# Patient Record
Sex: FEMALE | Race: BLACK OR AFRICAN AMERICAN | NOT HISPANIC OR LATINO | Employment: FULL TIME | URBAN - METROPOLITAN AREA
[De-identification: names, ages, dates, MRNs, and addresses within clinical notes are randomized per-mention and may not be internally consistent; named-entity substitution may affect disease eponyms.]

---

## 2018-04-13 LAB
ABSOL LYMPHOCYTES (HISTORICAL): 1.8 K/UL (ref 0.5–4)
ALBUMIN SERPL BCP-MCNC: 3.9 G/DL (ref 3–5.2)
ALP SERPL-CCNC: 55 U/L (ref 43–122)
ALT SERPL W P-5'-P-CCNC: 23 U/L (ref 9–52)
AMORPHOUS MATERIAL (HISTORICAL): ABNORMAL
AMPHETAMINE URINE (HISTORICAL): NEGATIVE
ANION GAP SERPL CALCULATED.3IONS-SCNC: 7 MMOL/L (ref 5–14)
AST SERPL W P-5'-P-CCNC: 20 U/L (ref 14–36)
BACTERIA UR QL AUTO: ABNORMAL
BARBITURATE URINE (HISTORICAL): NEGATIVE
BASOPHILS # BLD AUTO: 0 K/UL (ref 0–0.1)
BASOPHILS # BLD AUTO: 1 % (ref 0–1)
BENZODIAZEPINE URINE (HISTORICAL): NEGATIVE
BILIRUB SERPL-MCNC: 0.2 MG/DL
BILIRUB UR QL STRIP: NEGATIVE MG/DL
BUN SERPL-MCNC: 9 MG/DL (ref 5–25)
CALCIUM SERPL-MCNC: 9.2 MG/DL (ref 8.4–10.2)
CANDIDA ANTIGEN DETECTION (HISTORICAL): ABNORMAL
CASTS/CASTS TYPE (HISTORICAL): ABNORMAL /LPF
CHLORIDE SERPL-SCNC: 105 MEQ/L (ref 97–108)
CHOLEST SERPL-MCNC: 125 MG/DL
CHOLEST/HDLC SERPL: 2.1 {RATIO}
CK SERPL-CCNC: 98 U/L (ref 30–135)
CK SERPL-CCNC: 98 U/L (ref 30–135)
CLARITY UR: CLEAR
CO2 SERPL-SCNC: 28 MMOL/L (ref 22–30)
COCAINE (METAB.), URINE (HISTORICAL): NEGATIVE
COLOR UR: ABNORMAL
CREATINE KINASE-MB FRACTION (HISTORICAL): <0.22 NG/ML (ref 0–2.37)
CREATINE, SERUM (HISTORICAL): 0.85 MG/DL (ref 0.6–1.2)
CRYSTAL TYPE (HISTORICAL): ABNORMAL /HPF
D-DIMER QUANTITATIVE (HISTORICAL): <0.19 MG/L FEU'S
DEPRECATED RDW RBC AUTO: 16.8 %
DRUG COMMENT (HISTORICAL): ABNORMAL
EGFR (HISTORICAL): >60 ML/MIN/1.73 M2
EOSINOPHIL # BLD AUTO: 0.2 K/UL (ref 0–0.4)
EOSINOPHIL NFR BLD AUTO: 3 % (ref 0–6)
ERYTHROCYTE SEDIMENTATION RATE (HISTORICAL): 35 MM (ref 1–20)
ERYTHROCYTE SEDIMENTATION RATE (HISTORICAL): 42 MM (ref 1–20)
FERRITIN SERPL-MCNC: 5 NG/ML (ref 6–137)
GARDNERELLA VAGINALIS (HISTORICAL): DETECTED
GLUCOSE SERPL-MCNC: 100 MG/DL (ref 70–99)
GLUCOSE UR STRIP-MCNC: NEGATIVE MG/DL
HCT VFR BLD AUTO: 33.5 % (ref 36–46)
HDLC SERPL-MCNC: 60 MG/DL
HGB BLD-MCNC: 10.6 G/DL (ref 12–16)
HGB UR QL STRIP.AUTO: ABNORMAL
IRON SERPL-MCNC: 29 UG/DL (ref 37–170)
KETONES UR STRIP-MCNC: NEGATIVE MG/DL
LDL/HDL RATIO (HISTORICAL): 0.8
LDLC SERPL CALC-MCNC: 48 MG/DL
LEUKOCYTE ESTERASE UR QL STRIP: ABNORMAL
LYMPHOCYTES NFR BLD AUTO: 22 % (ref 25–45)
MAGNESIUM SERPL-MCNC: 1.8 MG/DL (ref 1.6–2.3)
MCH RBC QN AUTO: 25.3 PG (ref 26–34)
MCHC RBC AUTO-ENTMCNC: 31.7 % (ref 31–36)
MCV RBC AUTO: 80 FL (ref 80–100)
MDMA (GC/MS) (HISTORICAL): NEGATIVE
METHADONE URINE (HISTORICAL): NEGATIVE
METHAMPHETAMINE URINE (HISTORICAL): NEGATIVE
MONOCYTES # BLD AUTO: 0.5 K/UL (ref 0.2–0.9)
MONOCYTES NFR BLD AUTO: 7 % (ref 1–10)
MUCOUS THREADS URNS QL MICRO: ABNORMAL
NEUTROPHILS ABS COUNT (HISTORICAL): 5.4 K/UL (ref 1.8–7.8)
NEUTS SEG NFR BLD AUTO: 67 % (ref 45–65)
NITRITE UR QL STRIP: NEGATIVE
NON-SQ EPI CELLS URNS QL MICRO: ABNORMAL
OPIATES (HISTORICAL): NEGATIVE
OTHER STN SPEC: ABNORMAL
OXYCODONE (HISTORICAL): NEGATIVE
PERCENT SATURATION (HISTORICAL): 7 % (ref 11–46)
PH UR STRIP.AUTO: 7 [PH] (ref 4.5–8)
PHENCYCLIDINE URINE (HISTORICAL): NEGATIVE
PLATELET # BLD AUTO: 290 K/MCL (ref 150–450)
POTASSIUM SERPL-SCNC: 3.6 MEQ/L (ref 3.6–5)
PREGNANCY TEST URINE (HISTORICAL): NEGATIVE
PREGNANCY, SERUM (HISTORICAL): NEGATIVE
PROT UR STRIP-MCNC: NEGATIVE MG/DL
RBC # BLD AUTO: 4.2 M/MCL (ref 4–5.2)
RBC #/AREA URNS AUTO: 1 /HPF
SODIUM SERPL-SCNC: 140 MEQ/L (ref 137–147)
SP GR UR STRIP.AUTO: 1.01 (ref 1–1.04)
THC URINE (HISTORICAL): POSITIVE
TIBC SERPL-MCNC: 441 UG/DL (ref 265–497)
TOTAL PROTEIN (HISTORICAL): 6.8 G/DL (ref 5.9–8.4)
TRICHOMONAS (HISTORICAL): DETECTED
TRICYCLICS URINE (HISTORICAL): NEGATIVE
TRIGL SERPL-MCNC: 83 MG/DL
TROPONIN I SERPL-MCNC: 0.08 NG/ML (ref 0–0.03)
TROPONIN I SERPL-MCNC: 0.09 NG/ML (ref 0–0.03)
TSH SERPL DL<=0.05 MIU/L-ACNC: 1 UIU/ML (ref 0.47–4.68)
TSH SERPL DL<=0.05 MIU/L-ACNC: 1.03 UIU/ML (ref 0.47–4.68)
UROBILINOGEN UR QL STRIP.AUTO: NEGATIVE MG/DL (ref 0–1)
VLDLC SERPL CALC-MCNC: 17 MG/DL (ref 0–40)
WBC # BLD AUTO: 8.1 K/MCL (ref 4.5–11)
WBC #/AREA URNS AUTO: ABNORMAL /HPF

## 2018-04-14 LAB
CK SERPL-CCNC: 83 U/L (ref 30–135)
CREATINE KINASE-MB FRACTION (HISTORICAL): <0.22 NG/ML (ref 0–2.37)
TROPONIN I SERPL-MCNC: 0.09 NG/ML (ref 0–0.03)
TROPONIN I SERPL-MCNC: 0.1 NG/ML (ref 0–0.03)

## 2018-04-15 LAB
ABSOL LYMPHOCYTES (HISTORICAL): 1.6 K/UL (ref 0.5–4)
B-NP NT PRO (HISTORICAL): 23 PG/ML
BASOPHILS # BLD AUTO: 0 K/UL (ref 0–0.1)
BASOPHILS # BLD AUTO: 1 % (ref 0–1)
DEPRECATED RDW RBC AUTO: 17 %
EOSINOPHIL # BLD AUTO: 0.3 K/UL (ref 0–0.4)
EOSINOPHIL NFR BLD AUTO: 5 % (ref 0–6)
HCT VFR BLD AUTO: 33.7 % (ref 36–46)
HGB BLD-MCNC: 10.5 G/DL (ref 12–16)
LYMPHOCYTES NFR BLD AUTO: 25 % (ref 25–45)
MCH RBC QN AUTO: 25.2 PG (ref 26–34)
MCHC RBC AUTO-ENTMCNC: 31.2 % (ref 31–36)
MCV RBC AUTO: 81 FL (ref 80–100)
MONOCYTES # BLD AUTO: 0.7 K/UL (ref 0.2–0.9)
MONOCYTES NFR BLD AUTO: 10 % (ref 1–10)
NEUTROPHILS ABS COUNT (HISTORICAL): 3.8 K/UL (ref 1.8–7.8)
NEUTS SEG NFR BLD AUTO: 59 % (ref 45–65)
PLATELET # BLD AUTO: 289 K/MCL (ref 150–450)
RBC # BLD AUTO: 4.19 M/MCL (ref 4–5.2)
TROPONIN I SERPL-MCNC: 0.09 NG/ML (ref 0–0.03)
TROPONIN I SERPL-MCNC: 0.09 NG/ML (ref 0–0.03)
WBC # BLD AUTO: 6.5 K/MCL (ref 4.5–11)

## 2018-08-20 ENCOUNTER — HOSPITAL ENCOUNTER (EMERGENCY)
Facility: HOSPITAL | Age: 33
Discharge: HOME | End: 2018-08-21
Attending: EMERGENCY MEDICINE | Admitting: EMERGENCY MEDICINE
Payer: COMMERCIAL

## 2018-08-20 DIAGNOSIS — Z20.2 POSSIBLE EXPOSURE TO STD: Primary | ICD-10-CM

## 2018-08-20 LAB
BILIRUB UR QL STRIP: NEGATIVE
CLARITY UR: CLEAR
COLOR UR: ABNORMAL
EXT PREG TEST URINE: NEGATIVE
GLUCOSE UR STRIP-MCNC: NEGATIVE MG/DL
HGB UR QL STRIP.AUTO: NEGATIVE
KETONES UR STRIP-MCNC: ABNORMAL MG/DL
LEUKOCYTE ESTERASE UR QL STRIP: 25
NITRITE UR QL STRIP: NEGATIVE
PH UR STRIP.AUTO: 6 [PH] (ref 4.5–8)
PROT UR STRIP-MCNC: ABNORMAL MG/DL
SP GR UR STRIP.AUTO: 1.02 (ref 1–1.04)
UROBILINOGEN UA: 1 MG/DL

## 2018-08-20 PROCEDURE — 81001 URINALYSIS AUTO W/SCOPE: CPT | Performed by: EMERGENCY MEDICINE

## 2018-08-20 PROCEDURE — 87660 TRICHOMONAS VAGIN DIR PROBE: CPT | Performed by: EMERGENCY MEDICINE

## 2018-08-20 PROCEDURE — 87480 CANDIDA DNA DIR PROBE: CPT | Performed by: EMERGENCY MEDICINE

## 2018-08-20 PROCEDURE — 87491 CHLMYD TRACH DNA AMP PROBE: CPT | Performed by: EMERGENCY MEDICINE

## 2018-08-20 PROCEDURE — 81025 URINE PREGNANCY TEST: CPT | Performed by: EMERGENCY MEDICINE

## 2018-08-20 PROCEDURE — 87510 GARDNER VAG DNA DIR PROBE: CPT | Performed by: EMERGENCY MEDICINE

## 2018-08-20 PROCEDURE — 87591 N.GONORRHOEAE DNA AMP PROB: CPT | Performed by: EMERGENCY MEDICINE

## 2018-08-21 VITALS
BODY MASS INDEX: 33.96 KG/M2 | DIASTOLIC BLOOD PRESSURE: 89 MMHG | HEART RATE: 62 BPM | SYSTOLIC BLOOD PRESSURE: 140 MMHG | OXYGEN SATURATION: 100 % | HEIGHT: 62 IN | WEIGHT: 184.53 LBS | TEMPERATURE: 97.3 F | RESPIRATION RATE: 18 BRPM

## 2018-08-21 VITALS
OXYGEN SATURATION: 100 % | HEART RATE: 70 BPM | RESPIRATION RATE: 18 BRPM | DIASTOLIC BLOOD PRESSURE: 66 MMHG | TEMPERATURE: 99.1 F | WEIGHT: 184.53 LBS | BODY MASS INDEX: 33.75 KG/M2 | SYSTOLIC BLOOD PRESSURE: 119 MMHG

## 2018-08-21 DIAGNOSIS — Z20.2 POSSIBLE EXPOSURE TO STD: Primary | ICD-10-CM

## 2018-08-21 LAB
BACTERIA UR QL AUTO: ABNORMAL /HPF
CAOX CRY URNS QL MICRO: ABNORMAL /HPF
MUCOUS THREADS UR QL AUTO: ABNORMAL
NON-SQ EPI CELLS URNS QL MICRO: ABNORMAL /HPF
RBC #/AREA URNS AUTO: ABNORMAL /HPF
WBC #/AREA URNS AUTO: ABNORMAL /HPF

## 2018-08-21 PROCEDURE — 96372 THER/PROPH/DIAG INJ SC/IM: CPT

## 2018-08-21 PROCEDURE — 99284 EMERGENCY DEPT VISIT MOD MDM: CPT

## 2018-08-21 PROCEDURE — 99283 EMERGENCY DEPT VISIT LOW MDM: CPT

## 2018-08-21 RX ORDER — AZITHROMYCIN 250 MG/1
1000 TABLET, FILM COATED ORAL ONCE
Status: COMPLETED | OUTPATIENT
Start: 2018-08-21 | End: 2018-08-21

## 2018-08-21 RX ADMIN — LIDOCAINE HYDROCHLORIDE 250 MG: 10 INJECTION, SOLUTION EPIDURAL; INFILTRATION; INTRACAUDAL; PERINEURAL at 15:31

## 2018-08-21 RX ADMIN — AZITHROMYCIN 1000 MG: 250 TABLET, FILM COATED ORAL at 15:29

## 2018-08-21 NOTE — ED PROVIDER NOTES
History  Chief Complaint   Patient presents with    Abdominal Pain     per pt, she and significant other were involved w/ another person sexually and after sev  days she developed a bump on the tip of her tongue and abd  pain   afraid she may have caught something     75-year-old female presents with concern of having contracted a sexually transmitted infection  She reports that she and her boyfriend were engaged in a three some with another female  She states that she had oral sex with this female and the next day she had a small bump on her tongue  That is since resolved but she is concerned that there is a chance of her having contracted something else  She does admit that she has been urinating more frequently but denies any burning with urination  Denies any fever, abdominal pain, nausea, vomiting, diarrhea, or other symptoms  She states that she is not having any pelvic pain, vaginal discharge, or vaginal bleeding  None       Past Medical History:   Diagnosis Date    Anxiety     Heart problem     swelling around her heart       Past Surgical History:   Procedure Laterality Date    CERVICAL BIOPSY       SECTION         History reviewed  No pertinent family history  I have reviewed and agree with the history as documented  Social History   Substance Use Topics    Smoking status: Current Some Day Smoker     Types: Cigars    Smokeless tobacco: Never Used    Alcohol use Yes      Comment: socially        Review of Systems   Constitutional: Negative for appetite change, chills, fatigue and fever  HENT: Negative for postnasal drip, sinus pain and trouble swallowing  Eyes: Negative for redness and itching  Respiratory: Negative for chest tightness, shortness of breath and wheezing  Cardiovascular: Negative for chest pain and leg swelling  Gastrointestinal: Negative for abdominal pain, constipation, diarrhea, nausea and vomiting  Endocrine: Negative      Genitourinary: Negative for difficulty urinating and dysuria  Musculoskeletal: Negative for back pain and myalgias  Skin: Negative for rash  Allergic/Immunologic: Negative  Neurological: Negative for dizziness, numbness and headaches  Hematological: Negative  Psychiatric/Behavioral: Negative  Physical Exam  Physical Exam   Constitutional: She is oriented to person, place, and time  She appears well-developed and well-nourished  HENT:   Head: Normocephalic and atraumatic  Nose: Nose normal    Mouth/Throat: Oropharynx is clear and moist    Eyes: Conjunctivae and EOM are normal  Pupils are equal, round, and reactive to light  Neck: Normal range of motion  Neck supple  Cardiovascular: Normal rate, regular rhythm and normal heart sounds  Pulmonary/Chest: Effort normal and breath sounds normal  No respiratory distress  She has no wheezes  Abdominal: Soft  Bowel sounds are normal  There is no tenderness  There is no guarding  Genitourinary: Vagina normal  No vaginal discharge found  Musculoskeletal: She exhibits no edema, tenderness or deformity  Neurological: She is alert and oriented to person, place, and time  Skin: Skin is warm and dry  Capillary refill takes less than 2 seconds  No rash noted  Psychiatric: She has a normal mood and affect  Her behavior is normal    Nursing note and vitals reviewed        Vital Signs  ED Triage Vitals [08/20/18 2310]   Temperature Pulse Respirations Blood Pressure SpO2   (!) 97 3 °F (36 3 °C) 68 18 121/77 97 %      Temp Source Heart Rate Source Patient Position - Orthostatic VS BP Location FiO2 (%)   Temporal -- Sitting Left arm --      Pain Score       No Pain           Vitals:    08/20/18 2310   BP: 121/77   Pulse: 68   Patient Position - Orthostatic VS: Sitting       Visual Acuity      ED Medications  Medications - No data to display    Diagnostic Studies  Results Reviewed     Procedure Component Value Units Date/Time    Urine Microscopic [[id number]] (Abnormal) Collected:  08/20/18 2340    Lab Status:  Final result Specimen:  Urine from Urine, Clean Catch Updated:  08/21/18 0012     RBC, UA 0-1 (A) /hpf      WBC, UA 1-2 (A) /hpf      Epithelial Cells Moderate (A) /hpf      Bacteria, UA Occasional /hpf      Ca Oxalate Cristel, UA Occasional (A) /hpf      MUCOUS THREADS Innumerable (A)    Vaginosis DNA Probe [[id number]] Collected:  08/20/18 2341    Lab Status: In process Specimen:  Genital from Vaginal Updated:  08/21/18 0002    UA w Reflex to Microscopic [[id number]]  (Abnormal) Collected:  08/20/18 2340    Lab Status:  Final result Specimen:  Urine from Urine, Clean Catch Updated:  08/20/18 2358     Color, UA Karyna (A)     Clarity, UA Clear     Specific Hudson, UA 1 020     pH, UA 6 0     Leukocytes, UA 25 0 (A)     Nitrite, UA Negative     Protein, UA 15 (Trace) (A) mg/dl      Glucose, UA Negative mg/dl      Ketones, UA 5 (Trace) (A) mg/dl      Bilirubin, UA Negative     Blood, UA Negative     UROBILINOGEN UA 1 0 mg/dL     Chlamydia/GC amplified DNA by PCR [[id number]] Collected:  08/20/18 2341    Lab Status: In process Specimen:  Urine, Other Updated:  08/20/18 2353    POCT pregnancy, urine [[id number]]  (Normal) Resulted:  08/20/18 2347    Lab Status:  Final result Updated:  08/20/18 2348     EXT PREG TEST UR (Ref: Negative) negative                 No orders to display              Procedures  Procedures       Phone Contacts  ED Phone Contact    ED Course                               MDM  Number of Diagnoses or Management Options  Possible exposure to STD:   Diagnosis management comments: Patient presents with complaints of an oral lesion which has already healed  She also is concerned about the possibility that she could been exposed to a sexually transmitted infection  She denies any vaginal bleeding or discharge at this time  She has had some increased frequency with urination denies dysuria    Her urine does not appear to have an active infection at this time   Cultures have been sent for, gonorrhea, vaginosis  I have discussed with her need to follow up and have HIV testing done as well  She did have some concern about a small bump that she noticed a while ago on her right inner thigh  At this time it does not appear to be an abscess  She has tried to squeeze things out of it and has only gotten blood  It is possible that this is an ingrown hair  I informed her of the appropriate way to deal with this when she should seek follow-up for it  Amount and/or Complexity of Data Reviewed  Clinical lab tests: ordered and reviewed      CritCare Time    Disposition  Final diagnoses:   Possible exposure to STD     Time reflects when diagnosis was documented in both MDM as applicable and the Disposition within this note     Time User Action Codes Description Comment    8/21/2018 12:36 AM Onnie Sicard Add [Z20 2] Possible exposure to STD       ED Disposition     ED Disposition Condition Comment    Discharge  Juanita Lamb discharge to home/self care  Condition at discharge: Good        Follow-up Information    None         Patient's Medications    No medications on file     No discharge procedures on file      ED Provider  Electronically Signed by           Jose Juan Cheng DO  08/21/18 1994

## 2018-08-21 NOTE — ED PROVIDER NOTES
History  Chief Complaint   Patient presents with    Exposure to STD     possible exposure to STD  is being checked at ED for STD         27Year old female who presents for evaluation of possible STD exposure  Her only complaint today is urinary frequency  She denies any vaginal discharge, spotting or bleeding, pelvic pain abdominal pain fevers or chills  She reports that has been asymptomatic but her partner has penile discharge and she Is sexually active with him without protection  She was seen at [facility] last evening with the same complaint  At that time she had a pelvic exam as well as lab sent for gonorrhea and chlamydia, vaginosis, pregnancy, and urine  She was not treated at that time but is requesting STD treatment today  She does have a history of gonorrhea at age 21 which was treated  She is s/p tubal ligation  None       Past Medical History:   Diagnosis Date    Anxiety     Heart problem     swelling around her heart       Past Surgical History:   Procedure Laterality Date    CERVICAL BIOPSY       SECTION         History reviewed  No pertinent family history  I have reviewed and agree with the history as documented  Social History   Substance Use Topics    Smoking status: Current Some Day Smoker     Types: Cigars    Smokeless tobacco: Never Used    Alcohol use Yes      Comment: socially        Review of Systems   Constitutional: Negative for chills and fever  Respiratory: Negative for shortness of breath  Cardiovascular: Negative for chest pain  Genitourinary: Positive for frequency  Negative for dysuria, hematuria, pelvic pain, urgency, vaginal bleeding, vaginal discharge and vaginal pain  Skin: Negative for rash  Physical Exam  Physical Exam   Constitutional: She is oriented to person, place, and time  Vital signs are normal  She appears well-developed and well-nourished  Non-toxic appearance   She does not have a sickly appearance  She does not appear ill  No distress  Well appearing  Drinking coffee  HENT:   Head: Normocephalic and atraumatic  Right Ear: Hearing and external ear normal    Left Ear: Hearing and external ear normal    Nose: Nose normal    Mouth/Throat: Oropharynx is clear and moist    Eyes: Conjunctivae and EOM are normal  Pupils are equal, round, and reactive to light  Neck: Normal range of motion  Neck supple  Cardiovascular: Normal rate, regular rhythm and normal heart sounds  Exam reveals no gallop and no friction rub  No murmur heard  Pulmonary/Chest: Effort normal and breath sounds normal  No respiratory distress  She has no decreased breath sounds  She has no wheezes  She has no rhonchi  She has no rales  Abdominal: Soft  Normal appearance  There is no tenderness  Genitourinary:   Genitourinary Comments: Deferred - was performed yesterday   Musculoskeletal: Normal range of motion  Neurological: She is alert and oriented to person, place, and time  Skin: Skin is warm and dry  Capillary refill takes less than 2 seconds  She is not diaphoretic  Psychiatric: She has a normal mood and affect  Nursing note and vitals reviewed        Vital Signs  ED Triage Vitals [08/21/18 1501]   Temperature Pulse Respirations Blood Pressure SpO2   99 1 °F (37 3 °C) 70 18 119/66 100 %      Temp Source Heart Rate Source Patient Position - Orthostatic VS BP Location FiO2 (%)   Oral Monitor Sitting Left arm --      Pain Score       --           Vitals:    08/21/18 1501   BP: 119/66   Pulse: 70   Patient Position - Orthostatic VS: Sitting       Visual Acuity      ED Medications  Medications   cefTRIAXone (ROCEPHIN) 250 mg in lidocaine (PF) (XYLOCAINE-MPF) 1 % IM only syringe (250 mg Intramuscular Given 8/21/18 1531)   azithromycin (ZITHROMAX) tablet 1,000 mg (1,000 mg Oral Given 8/21/18 1529)       Diagnostic Studies  Results Reviewed     None                 No orders to display Procedures  Procedures       Phone Contacts  ED Phone Contact    ED Course                               MDM  Number of Diagnoses or Management Options  Possible exposure to STD: new and does not require workup  Diagnosis management comments:   26 yo F presents with possible STD exposure  Was seen yesterday at [facility], had pelvic exam with g/c and vaginosis testing performed - results still in process at this time  She returns today for treatment as she states her partner has active penile discharge  Will treat for gonorrhea and chlamydia, and f/u with lab results from yesterday  Advised no intercourse x 10 days and treatment of partners  Advised f/u with STD clinic for further STD testing  Patient verbalizes understanding and agrees with plan  Amount and/or Complexity of Data Reviewed  Clinical lab tests: ordered and reviewed  Decide to obtain previous medical records or to obtain history from someone other than the patient: yes  Review and summarize past medical records: yes    Patient Progress  Patient progress: stable    CritCare Time    Disposition  Final diagnoses:   Possible exposure to STD     Time reflects when diagnosis was documented in both MDM as applicable and the Disposition within this note     Time User Action Codes Description Comment    8/21/2018  3:34 PM Bri Torres Add [Z20 2] Possible exposure to STD       ED Disposition     ED Disposition Condition Comment    Discharge  Thaddeus Espinal discharge to home/self care  Condition at discharge: Good        Follow-up Information     Follow up With Specialties Details Why Contact Info Additional Information    [facility]  If symptoms worsen [address]  [phone]  ED, [address] [place] [address], [id number]          There are no discharge medications for this patient  No discharge procedures on file      ED Provider  Electronically Signed by           Juanito Stanton PA-C  08/21/18 8203

## 2018-08-21 NOTE — DISCHARGE INSTRUCTIONS
Your STD test results will come in a couple days  If they are positive, you will be notified  Here is some information on common STDs  As we discussed, you should consider being tested for HIV as well  This [facility] be done through the [facility]  Gonorrhea   WHAT YOU NEED TO KNOW:   Gonorrhea, or gonococcal urethritis, is a sexually transmitted infection (STI) caused by bacteria  It is spread by unprotected oral, vaginal, or anal sex  Gonorrhea causes inflammation of the urethra  The urethra is the tube where urine passes from the bladder to the outside of the body  Anyone with multiple sexual partners is at higher risk for gonorrhea  DISCHARGE INSTRUCTIONS:   Return to the emergency department if:   · You have chest pain or trouble breathing  · You have pain and swelling in your scrotum  · You have pain in your abdomen or joints  Contact your healthcare provider if:   · You have a fever  · You have chills, a cough, or feel weak and achy  · You have questions or concerns about your condition or care  Medicines:   · Antibiotics  help treat the infection caused by bacteria  Both you and your sexual partner have to be treated to prevent gonorrhea from spreading  · Take your medicine as directed  Contact your healthcare provider if you think your medicine is not helping or if you have side effects  Tell him of her if you are allergic to any medicine  Keep a list of the medicines, vitamins, and herbs you take  Include the amounts, and when and why you take them  Bring the list or the pill bottles to follow-up visits  Carry your medicine list with you in case of an emergency  Prevent the spread of gonorrhea:   · Use a condom  during oral, vaginal, and anal sex  Ask for more information about the correct way to use condoms  · Do not have sex with someone who has gonorrhea  This includes oral, vaginal, and anal sex      · Do not have sex while you or your partner are being treated for gonorrhea  Ask when it is safe to have sex  · Tell your healthcare provider if you are pregnant  Gonorrhea can be passed to an infant during birth  Follow up with your healthcare provider as directed:  Write down your questions so you remember to ask them during your visits  © 2017 [employer]  Information is for End User's use only and may not be sold, redistributed or otherwise used for commercial purposes  All illustrations and images included in CareNotes® are the copyrighted property of [employer]  or [employer]  The above information is an  only  It is not intended as medical advice for individual conditions or treatments  Talk to your doctor, nurse or pharmacist before following any medical regimen to see if it is safe and effective for you  Chlamydia   WHAT YOU NEED TO KNOW:   Chlamydia is a sexually transmitted infection (STI)  It is caused by a bacteria most often spread through vaginal, oral, or anal sex  You have an increased risk of chlamydia if you have another STI, such as gonorrhea  Your risk is also higher if you have more than 1 sex partner  DISCHARGE INSTRUCTIONS:   Return to the emergency department if:   · You have a fever  · You have nausea or you cannot stop vomiting  · You have severe abdominal pain  Contact your healthcare provider if:   · Your signs or symptoms last longer than 1 week or get worse during treatment  · Your signs or symptoms return after treatment  · You have pain during sex  · You have questions or concerns about your condition or care  Medicines:   · Antibiotics  kill the bacteria that causes chlamydia  Take them as directed  · Take your medicine as directed  Contact your healthcare provider if you think your medicine is not helping or if you have side effects  Tell him or her if you are allergic to any medicine  Keep a list of the medicines, vitamins, and herbs you take   Include the amounts, and when and why you take them  Bring the list or the pill bottles to follow-up visits  Carry your medicine list with you in case of an emergency  Follow up with your healthcare provider as directed: You may need to return regularly for tests  Write down your questions so you remember to ask them during your visits  Prevent the spread of chlamydia:   · Wash your hands often  Use soap and water  Wash your hands after you use the bathroom  This helps prevent the infection from spreading to other parts of your body, such as your eyes  · Use a latex condom during sex to prevent chlamydia and other STIs  Use a new condom each time you have sex  · Talk to your sex partners  Tell anyone you have had sex with in the last 3 months that you have chlamydia  They may also be infected and need treatment  Ask your sex partners to get tested before you have sex  · Do not have sex until you and your partner have taken all of your antibiotics  Ask your healthcare provider when it is safe to have sex  · Get regular screenings for STIs  Ask your healthcare provider how often to get tested for STIs  He may tell you to get tested after you have sex with a new partner  Manage your symptoms:   · Keep your genital area clean and dry  Take showers instead of baths, and use unscented soap  · Do not douche unless your healthcare provider says it is okay  Do not use feminine hygiene sprays or powders  Tell your healthcare provider if you are pregnant:  You can spread chlamydia to your baby while you are pregnant  Your baby could get an eye infection or pneumonia  Chlamydia may also cause your baby to be born too early  Early treatment may prevent your baby from getting chlamydia  © 2017 [employer] Information is for End User's use only and may not be sold, redistributed or otherwise used for commercial purposes   All illustrations and images included in CareNotes® are the copyrighted property of [employer]  or [employer]  The above information is an  only  It is not intended as medical advice for individual conditions or treatments  Talk to your doctor, nurse or pharmacist before following any medical regimen to see if it is safe and effective for you

## 2018-08-21 NOTE — DISCHARGE INSTRUCTIONS
Postexposure Prophylaxis   WHAT YOU NEED TO KNOW:   Postexposure prophylaxis (PEP) is medical care given to prevent HIV, hepatitis B, and other diseases  PEP may include first aid, testing, and medicines  Exposure can occur when you have contact with certain body fluids from another person  These fluids include blood, semen, and vaginal fluid  They also include any other body fluid that may have blood in it, such as saliva or urine  You are exposed if these fluids touch an open area of your skin, such as a cut  You also are exposed if the fluids touch a mucus membrane  This is a moist area, such as in the eyes, nose, and mouth  You can be exposed by a needlestick through the skin  DISCHARGE INSTRUCTIONS:   Medicines:   · Antiretrovirals: These medicines help prevent HIV  They must be taken for 28 days, unless your healthcare provider tells you otherwise  You may be given a starter pack with enough medicine for 1 to 7 days  You may be given medicine for the full 28 days instead  If you receive a starter pack, you must return to your healthcare provider in 1 to 7 days  At this visit, you will receive the rest of the medicine  · Hepatitis B vaccine: This medicine helps prevent hepatitis B  You will need 3 doses (shots) of the vaccine  The second dose should be taken 1 to 2 months after the first dose  The third dose should be taken 4 to 6 months after the first dose  · Antibiotics: These are germ-killing medicines to help treat or prevent sexually transmitted diseases, such as gonorrhea  · Take your medicine as directed  Contact your healthcare provider if you think your medicine is not helping or if you have side effects  Tell him or her if you are allergic to any medicine  Keep a list of the medicines, vitamins, and herbs you take  Include the amounts, and when and why you take them  Bring the list or the pill bottles to follow-up visits   Carry your medicine list with you in case of an emergency  Follow up with your healthcare provider as directed: If you did not receive any treatment after the exposure, you will need to follow up with your healthcare provider within 1 week  If you were given antiretrovirals, you will need a follow-up visit in about 2 weeks  Further HIV testing will be needed 6 weeks, 3 months, and 6 months after the exposure  You may have HIV testing up to 12 months after the exposure  Precautions: In case you are infected, you will need to take steps to keep others from getting sick  These steps can help you avoid being exposed again:  · Avoid sex, or have safe sex  Safe sex means having sex only with one person who is not infected and who is only having sex with you  It also means using condoms each time you have sex  · Avoid injection drug use  If you cannot do this, always use needles that are sterile (germ-free)  · Follow all safety rules at your workplace if you are at risk of exposure  Be sure to use safety equipment as needed  · Get the hepatitis B vaccine if you have not already  · Do not breastfeed unless you know you are not infected  In case of future exposure: If you think you have been exposed, get first aid right away  If you are at work, follow work policy to report the exposure  The type of first aid you need depends on what part of your body was exposed:  · Open skin:  Wash the area right away with soap and water  Use a gel hand  if you do not have soap or running water  Do not use anything harsh, such as bleach  Do not squeeze or rub the skin  · Eyes:  Rinse your eyes with water or saline (a salt solution) right away  Be sure to clean well by moving your eyelids with your fingers as you rinse  Keep contact lenses in while rinsing your eyes, then remove and clean them as usual  Avoid soap or other   · Mouth:  Spit out the blood or body fluid right away  Rinse your mouth with water or saline several times   Avoid putting soap or other  in your mouth  For support and more information: It can be hard to cope if you think you have been exposed to a disease  You may feel scared and concerned about your health  This is normal  It can be helpful to find out all you can about the risk of exposure  Counseling or joining a support group also can help  Talk to your healthcare provider, or contact the following:   · [facility] for HIV/AIDS, Viral Hepatitis, STD, and TB Prevention, [facility]  Web Address: www [facility]/  · [employer] Post-Exposure Prophylaxis Hotline  Web Address: www [facility]/about_[facility]/pepline/  Contact your healthcare provider if:   · You have nausea or diarrhea  · You are more tired than usual      · You have new headaches, or you feel dizzy  · You have trouble sleeping  · Your eyes or skin turn yellow  · You are not eating because of appetite loss  · You are or may be pregnant  Return to the emergency department if:   · You have a fever  · You have a rash  · You have new muscle pain or pain in your back or abdomen  · You urinate more often than usual, have blood in your urine, or have pain while urinating  · You are more thirsty than usual      · You have trouble swallowing or breathing  © 2017 [address]  Information is for End User's use only and may not be sold, redistributed or otherwise used for commercial purposes  All illustrations and images included in CareNotes® are the copyrighted property of A D A M , Inc  or Dominic Andre  The above information is an  only  It is not intended as medical advice for individual conditions or treatments  Talk to your doctor, nurse or pharmacist before following any medical regimen to see if it is safe and effective for you  NO INTERCOURSE X 10 DAYS      FOLLOW UP STD CLINIC

## 2018-08-22 LAB
CANDIDA RRNA VAG QL PROBE: NEGATIVE
CHLAMYDIA DNA CVX QL NAA+PROBE: ABNORMAL
G VAGINALIS RRNA GENITAL QL PROBE: POSITIVE
N GONORRHOEA DNA GENITAL QL NAA+PROBE: ABNORMAL
T VAGINALIS RRNA GENITAL QL PROBE: NEGATIVE

## 2018-08-28 ENCOUNTER — TELEPHONE (OUTPATIENT)
Dept: EMERGENCY DEPT | Facility: HOSPITAL | Age: 33
End: 2018-08-28

## 2018-08-30 NOTE — PROGRESS NOTES
Patient called about positive Chlamydia and Gardnerella result  She was not treated for this in the emergency department  Patient will need to return to the emergency department or PCP office for ceftriaxone and Flagyl (if PCP has IM ceftriaxone)  Patient did not answer and a message was left on machine  However since it is 8 days later, I am suspecting that patient was called previously but no note was posted, I will send a letter to the patient's house

## 2018-09-21 ENCOUNTER — ANNUAL EXAM (OUTPATIENT)
Dept: OBGYN CLINIC | Facility: CLINIC | Age: 33
End: 2018-09-21
Payer: COMMERCIAL

## 2018-09-21 ENCOUNTER — APPOINTMENT (OUTPATIENT)
Dept: LAB | Facility: HOSPITAL | Age: 33
End: 2018-09-21
Attending: OBSTETRICS & GYNECOLOGY
Payer: COMMERCIAL

## 2018-09-21 VITALS — BODY MASS INDEX: 32.92 KG/M2 | DIASTOLIC BLOOD PRESSURE: 75 MMHG | WEIGHT: 180 LBS | SYSTOLIC BLOOD PRESSURE: 112 MMHG

## 2018-09-21 DIAGNOSIS — Z11.3 SCREENING EXAMINATION FOR SEXUALLY TRANSMITTED DISEASE: ICD-10-CM

## 2018-09-21 DIAGNOSIS — Z12.4 SCREENING FOR CERVICAL CANCER: Primary | ICD-10-CM

## 2018-09-21 DIAGNOSIS — Z01.419 ENCOUNTER FOR GYNECOLOGICAL EXAMINATION (GENERAL) (ROUTINE) WITHOUT ABNORMAL FINDINGS: ICD-10-CM

## 2018-09-21 PROCEDURE — 36415 COLL VENOUS BLD VENIPUNCTURE: CPT

## 2018-09-21 PROCEDURE — 86803 HEPATITIS C AB TEST: CPT

## 2018-09-21 PROCEDURE — G0145 SCR C/V CYTO,THINLAYER,RESCR: HCPCS | Performed by: PATHOLOGY

## 2018-09-21 PROCEDURE — 99395 PREV VISIT EST AGE 18-39: CPT | Performed by: OBSTETRICS & GYNECOLOGY

## 2018-09-21 PROCEDURE — 87389 HIV-1 AG W/HIV-1&-2 AB AG IA: CPT

## 2018-09-21 PROCEDURE — 86592 SYPHILIS TEST NON-TREP QUAL: CPT

## 2018-09-21 PROCEDURE — 87624 HPV HI-RISK TYP POOLED RSLT: CPT | Performed by: OBSTETRICS & GYNECOLOGY

## 2018-09-21 PROCEDURE — G0124 SCREEN C/V THIN LAYER BY MD: HCPCS | Performed by: PATHOLOGY

## 2018-09-21 PROCEDURE — 87491 CHLMYD TRACH DNA AMP PROBE: CPT | Performed by: OBSTETRICS & GYNECOLOGY

## 2018-09-21 PROCEDURE — 87591 N.GONORRHOEAE DNA AMP PROB: CPT | Performed by: OBSTETRICS & GYNECOLOGY

## 2018-09-21 NOTE — PROGRESS NOTES
Assessment/Plan:             Diagnoses and all orders for this visit:    Screening for cervical cancer  -     Liquid-based pap, screening; Future    Screening examination for sexually transmitted disease  -     Chlamydia/GC amplified DNA by PCR; Future    Encounter for gynecological examination (general) (routine) without abnormal findings    - Return in 1 year for routine exam            Subjective:      Patient ID: Monalisa Negron is a 27 y o  female who presents for her annual exam  Her LMP began on 8/28/18  She has no menstrual complaints  She also reports recent treatment for GC/CT at ER, which she tested positive for GC  She also reports recurrent watery diarrhea since her treatment  She is able to eat and drink normally but reports some nausea  She is planning to see PCP Monday for this issue  She expressed she would like to have repeat STI testing in addition to HIV, HCV  HPI    The following portions of the patient's history were reviewed and updated as appropriate: allergies, current medications, past family history, past medical history, past social history, past surgical history and problem list     Review of Systems      Objective: Wt 81 6 kg (180 lb)   LMP 08/28/2018 (Approximate)   BMI 32 92 kg/m²           Physical Exam   Constitutional: She is oriented to person, place, and time  She appears well-developed and well-nourished  No distress  Cardiovascular: Normal rate, regular rhythm and normal heart sounds  Exam reveals no gallop and no friction rub  No murmur heard  Pulmonary/Chest: Effort normal and breath sounds normal  No respiratory distress  She has no wheezes  She has no rales  Abdominal: Soft  Bowel sounds are normal  She exhibits no distension and no mass  There is tenderness  There is no rebound and no guarding  Genitourinary: Vagina normal and uterus normal  No breast swelling, tenderness, discharge or bleeding  No labial fusion   There is no rash, tenderness, lesion or injury on the right labia  There is no rash, tenderness, lesion or injury on the left labia  Uterus is not deviated, not enlarged, not fixed and not tender  Cervix exhibits no motion tenderness, no discharge and no friability  Right adnexum displays no mass, no tenderness and no fullness  Left adnexum displays no mass, no tenderness and no fullness  No erythema, tenderness or bleeding in the vagina  No foreign body in the vagina  No signs of injury around the vagina  No vaginal discharge found  Neurological: She is alert and oriented to person, place, and time  Skin: Skin is warm and dry  She is not diaphoretic  Psychiatric: She has a normal mood and affect   Her behavior is normal

## 2018-09-22 LAB — HCV AB SER QL: NORMAL

## 2018-09-24 LAB
CHLAMYDIA DNA CVX QL NAA+PROBE: NORMAL
HIV 1+2 AB+HIV1 P24 AG SERPL QL IA: NORMAL
N GONORRHOEA DNA GENITAL QL NAA+PROBE: NORMAL
RPR SER QL: NORMAL

## 2018-09-25 ENCOUNTER — TELEPHONE (OUTPATIENT)
Dept: OBGYN CLINIC | Facility: CLINIC | Age: 33
End: 2018-09-25

## 2018-09-25 LAB
HPV HR 12 DNA CVX QL NAA+PROBE: POSITIVE
HPV16 DNA CVX QL NAA+PROBE: NEGATIVE
HPV18 DNA CVX QL NAA+PROBE: NEGATIVE

## 2018-09-27 LAB
LAB AP GYN PRIMARY INTERPRETATION: NORMAL
LAB AP LMP: NORMAL
Lab: NORMAL
PATH INTERP SPEC-IMP: NORMAL

## 2018-10-02 ENCOUNTER — TELEPHONE (OUTPATIENT)
Dept: OBGYN CLINIC | Facility: CLINIC | Age: 33
End: 2018-10-02

## 2018-10-02 NOTE — TELEPHONE ENCOUNTER
Called patient to discuss results, phone number not accepting calls at this time  Will mail letter to patient

## 2018-10-15 ENCOUNTER — TELEPHONE (OUTPATIENT)
Dept: OBGYN CLINIC | Facility: CLINIC | Age: 33
End: 2018-10-15

## 2018-10-24 ENCOUNTER — APPOINTMENT (EMERGENCY)
Dept: RADIOLOGY | Facility: HOSPITAL | Age: 33
End: 2018-10-24
Payer: COMMERCIAL

## 2018-10-24 ENCOUNTER — HOSPITAL ENCOUNTER (EMERGENCY)
Facility: HOSPITAL | Age: 33
Discharge: HOME | End: 2018-10-25
Attending: EMERGENCY MEDICINE | Admitting: EMERGENCY MEDICINE
Payer: COMMERCIAL

## 2018-10-24 VITALS
HEART RATE: 70 BPM | BODY MASS INDEX: 33.1 KG/M2 | RESPIRATION RATE: 18 BRPM | SYSTOLIC BLOOD PRESSURE: 100 MMHG | OXYGEN SATURATION: 99 % | WEIGHT: 179.9 LBS | TEMPERATURE: 98.6 F | DIASTOLIC BLOOD PRESSURE: 69 MMHG | HEIGHT: 62 IN

## 2018-10-24 DIAGNOSIS — F32.A DEPRESSION: ICD-10-CM

## 2018-10-24 DIAGNOSIS — F19.90 MISUSE OF OVER-THE-COUNTER MEDICATIONS: Primary | ICD-10-CM

## 2018-10-24 LAB
ALBUMIN SERPL BCP-MCNC: 4 G/DL (ref 3.5–5)
ALP SERPL-CCNC: 63 U/L (ref 46–116)
ALT SERPL W P-5'-P-CCNC: 22 U/L (ref 12–78)
AMPHETAMINES SERPL QL SCN: NEGATIVE
ANION GAP SERPL CALCULATED.3IONS-SCNC: 6 MMOL/L (ref 4–13)
APAP SERPL-MCNC: <2 UG/ML (ref 10–30)
APTT PPP: 34 SECONDS (ref 24–36)
AST SERPL W P-5'-P-CCNC: 18 U/L (ref 5–45)
BACTERIA UR QL AUTO: ABNORMAL /HPF
BARBITURATES UR QL: NEGATIVE
BASOPHILS # BLD AUTO: 0.03 THOUSANDS/ΜL (ref 0–0.1)
BASOPHILS NFR BLD AUTO: 0 % (ref 0–1)
BENZODIAZ UR QL: NEGATIVE
BILIRUB SERPL-MCNC: 0.3 MG/DL (ref 0.2–1)
BILIRUB UR QL STRIP: NEGATIVE
BUN SERPL-MCNC: 9 MG/DL (ref 5–25)
CALCIUM SERPL-MCNC: 9 MG/DL (ref 8.3–10.1)
CHLORIDE SERPL-SCNC: 107 MMOL/L (ref 100–108)
CLARITY UR: CLEAR
CO2 SERPL-SCNC: 27 MMOL/L (ref 21–32)
COCAINE UR QL: NEGATIVE
COLOR UR: YELLOW
COLOR, POC: YELLOW
CREAT SERPL-MCNC: 0.77 MG/DL (ref 0.6–1.3)
EOSINOPHIL # BLD AUTO: 0.08 THOUSAND/ΜL (ref 0–0.61)
EOSINOPHIL NFR BLD AUTO: 1 % (ref 0–6)
ERYTHROCYTE [DISTWIDTH] IN BLOOD BY AUTOMATED COUNT: 17.1 % (ref 11.6–15.1)
ETHANOL SERPL-MCNC: <3 MG/DL (ref 0–3)
EXT PREG TEST URINE: NEGATIVE
GFR SERPL CREATININE-BSD FRML MDRD: 117 ML/MIN/1.73SQ M
GLUCOSE SERPL-MCNC: 82 MG/DL (ref 65–140)
GLUCOSE UR STRIP-MCNC: NEGATIVE MG/DL
HCT VFR BLD AUTO: 38.3 % (ref 34.8–46.1)
HGB BLD-MCNC: 11.7 G/DL (ref 11.5–15.4)
HGB UR QL STRIP.AUTO: ABNORMAL
HYALINE CASTS #/AREA URNS LPF: ABNORMAL /LPF
IMM GRANULOCYTES # BLD AUTO: 0.03 THOUSAND/UL (ref 0–0.2)
IMM GRANULOCYTES NFR BLD AUTO: 0 % (ref 0–2)
INR PPP: 1.01 (ref 0.86–1.17)
KETONES UR STRIP-MCNC: ABNORMAL MG/DL
LEUKOCYTE ESTERASE UR QL STRIP: NEGATIVE
LYMPHOCYTES # BLD AUTO: 2.26 THOUSANDS/ΜL (ref 0.6–4.47)
LYMPHOCYTES NFR BLD AUTO: 28 % (ref 14–44)
MCH RBC QN AUTO: 25.9 PG (ref 26.8–34.3)
MCHC RBC AUTO-ENTMCNC: 30.5 G/DL (ref 31.4–37.4)
MCV RBC AUTO: 85 FL (ref 82–98)
METHADONE UR QL: NEGATIVE
MONOCYTES # BLD AUTO: 0.59 THOUSAND/ΜL (ref 0.17–1.22)
MONOCYTES NFR BLD AUTO: 7 % (ref 4–12)
NEUTROPHILS # BLD AUTO: 5.03 THOUSANDS/ΜL (ref 1.85–7.62)
NEUTS SEG NFR BLD AUTO: 64 % (ref 43–75)
NITRITE UR QL STRIP: NEGATIVE
NON-SQ EPI CELLS URNS QL MICRO: ABNORMAL /HPF
NRBC BLD AUTO-RTO: 0 /100 WBCS
OPIATES UR QL SCN: NEGATIVE
PCP UR QL: NEGATIVE
PH UR STRIP.AUTO: 6 [PH] (ref 4.5–8)
PLATELET # BLD AUTO: 269 THOUSANDS/UL (ref 149–390)
PMV BLD AUTO: 10.8 FL (ref 8.9–12.7)
POTASSIUM SERPL-SCNC: 3.7 MMOL/L (ref 3.5–5.3)
PROT SERPL-MCNC: 7.5 G/DL (ref 6.4–8.2)
PROT UR STRIP-MCNC: NEGATIVE MG/DL
PROTHROMBIN TIME: 13.4 SECONDS (ref 11.8–14.2)
RBC # BLD AUTO: 4.52 MILLION/UL (ref 3.81–5.12)
RBC #/AREA URNS AUTO: ABNORMAL /HPF
SALICYLATES SERPL-MCNC: 3 MG/DL (ref 3–20)
SODIUM SERPL-SCNC: 140 MMOL/L (ref 136–145)
SP GR UR STRIP.AUTO: >=1.03 (ref 1–1.03)
THC UR QL: POSITIVE
TROPONIN I SERPL-MCNC: <0.02 NG/ML
UROBILINOGEN UR QL STRIP.AUTO: 0.2 E.U./DL
WBC # BLD AUTO: 8.02 THOUSAND/UL (ref 4.31–10.16)
WBC #/AREA URNS AUTO: ABNORMAL /HPF

## 2018-10-24 PROCEDURE — 87086 URINE CULTURE/COLONY COUNT: CPT

## 2018-10-24 PROCEDURE — 85025 COMPLETE CBC W/AUTO DIFF WBC: CPT | Performed by: EMERGENCY MEDICINE

## 2018-10-24 PROCEDURE — 81025 URINE PREGNANCY TEST: CPT | Performed by: EMERGENCY MEDICINE

## 2018-10-24 PROCEDURE — 94640 AIRWAY INHALATION TREATMENT: CPT

## 2018-10-24 PROCEDURE — 71046 X-RAY EXAM CHEST 2 VIEWS: CPT

## 2018-10-24 PROCEDURE — 99285 EMERGENCY DEPT VISIT HI MDM: CPT

## 2018-10-24 PROCEDURE — 81001 URINALYSIS AUTO W/SCOPE: CPT

## 2018-10-24 PROCEDURE — 80320 DRUG SCREEN QUANTALCOHOLS: CPT | Performed by: EMERGENCY MEDICINE

## 2018-10-24 PROCEDURE — 36415 COLL VENOUS BLD VENIPUNCTURE: CPT | Performed by: EMERGENCY MEDICINE

## 2018-10-24 PROCEDURE — 80053 COMPREHEN METABOLIC PANEL: CPT | Performed by: EMERGENCY MEDICINE

## 2018-10-24 PROCEDURE — 85730 THROMBOPLASTIN TIME PARTIAL: CPT | Performed by: EMERGENCY MEDICINE

## 2018-10-24 PROCEDURE — 80329 ANALGESICS NON-OPIOID 1 OR 2: CPT | Performed by: EMERGENCY MEDICINE

## 2018-10-24 PROCEDURE — 85610 PROTHROMBIN TIME: CPT | Performed by: EMERGENCY MEDICINE

## 2018-10-24 PROCEDURE — 84484 ASSAY OF TROPONIN QUANT: CPT | Performed by: EMERGENCY MEDICINE

## 2018-10-24 PROCEDURE — 93005 ELECTROCARDIOGRAM TRACING: CPT

## 2018-10-24 PROCEDURE — 80307 DRUG TEST PRSMV CHEM ANLYZR: CPT | Performed by: EMERGENCY MEDICINE

## 2018-10-24 RX ORDER — ACETAMINOPHEN 325 MG/1
650 TABLET ORAL ONCE
Status: COMPLETED | OUTPATIENT
Start: 2018-10-24 | End: 2018-10-24

## 2018-10-24 RX ORDER — LORAZEPAM 1 MG/1
1 TABLET ORAL 3 TIMES DAILY PRN
Qty: 5 TABLET | Refills: 0 | Status: SHIPPED | OUTPATIENT
Start: 2018-10-24 | End: 2019-03-26

## 2018-10-24 RX ORDER — ACETAMINOPHEN 325 MG/1
975 TABLET ORAL EVERY 6 HOURS PRN
Status: DISCONTINUED | OUTPATIENT
Start: 2018-10-24 | End: 2018-10-24

## 2018-10-24 RX ORDER — LORAZEPAM 0.5 MG/1
1 TABLET ORAL ONCE
Status: COMPLETED | OUTPATIENT
Start: 2018-10-24 | End: 2018-10-24

## 2018-10-24 RX ADMIN — LORAZEPAM 1 MG: 0.5 TABLET ORAL at 20:13

## 2018-10-24 RX ADMIN — ACETAMINOPHEN 650 MG: 325 TABLET, FILM COATED ORAL at 21:01

## 2018-10-24 NOTE — ED PROVIDER NOTES
History  Chief Complaint   Patient presents with    Overdose - Intentional     Pt took 8 advil PM's (4 x 2 times 1-2 hours prior to arrival)  Pt has daughter at bedside  25 y/o female, hx of depression, presents to the ED for medication misuse  Patient states that she has had increased depression over the last few days  She reports that she had a headache and wanted to get some sleep so she took 4 motrin with benadryl  She states that she did not get any improvement and 2 hours later she took 4 more  She denies any attempt at self harm and states that she just wanted to get some sleep tonight  She denies any SI/HI  Denies any suicide attempts in the past  Denies any prior psychiatric hospitalizations  She denies any abd pain or n/v/d  She does state that she has chest pain, which she states that she frequently has, and has had a prior cardiac workup for in the past  She denies any other complaints  History provided by:  Patient      None       Past Medical History:   Diagnosis Date    Anxiety     Heart problem     swelling around her heart       Past Surgical History:   Procedure Laterality Date    CERVICAL BIOPSY       SECTION         History reviewed  No pertinent family history  I have reviewed and agree with the history as documented  Social History   Substance Use Topics    Smoking status: Former Smoker     Types: Cigars    Smokeless tobacco: Never Used    Alcohol use Yes      Comment: socially        Review of Systems   Constitutional: Negative for chills and fever  HENT: Negative for congestion, ear pain and sore throat  Eyes: Negative for pain and visual disturbance  Respiratory: Negative for cough, shortness of breath and wheezing  Cardiovascular: Positive for chest pain  Negative for leg swelling  Gastrointestinal: Negative for abdominal pain, diarrhea, nausea and vomiting  Genitourinary: Negative for dysuria, frequency, hematuria and urgency  Musculoskeletal: Negative for neck pain and neck stiffness  Skin: Negative for rash and wound  Neurological: Negative for weakness, numbness and headaches  Psychiatric/Behavioral: Negative for agitation and confusion  All other systems reviewed and are negative  Physical Exam  ED Triage Vitals   Temperature Pulse Respirations Blood Pressure SpO2   10/24/18 1838 10/24/18 1838 10/24/18 1838 10/24/18 1838 10/24/18 1838   98 6 °F (37 °C) 71 18 128/72 100 %      Temp Source Heart Rate Source Patient Position - Orthostatic VS BP Location FiO2 (%)   10/24/18 1838 10/24/18 2012 10/24/18 2012 10/24/18 2012 --   Oral Monitor Lying Right arm       Pain Score       10/24/18 1838       No Pain           Orthostatic Vital Signs  Vitals:    10/24/18 1838 10/24/18 2012 10/24/18 2149   BP: 128/72 116/55 100/69   Pulse: 71 75 70   Patient Position - Orthostatic VS:  Lying Lying       Physical Exam   Constitutional: She is oriented to person, place, and time  She appears well-developed and well-nourished  HENT:   Head: Normocephalic and atraumatic  Mouth/Throat: Oropharynx is clear and moist    Eyes: Pupils are equal, round, and reactive to light  EOM are normal    Neck: Normal range of motion  Neck supple  Cardiovascular: Normal rate and regular rhythm  Pulmonary/Chest: Effort normal and breath sounds normal  No respiratory distress  She has no wheezes  She has no rales  She exhibits no tenderness  Abdominal: Soft  Bowel sounds are normal  She exhibits no distension  There is no tenderness  Musculoskeletal: Normal range of motion  Neurological: She is alert and oriented to person, place, and time  No focal deficits  No clonus  Skin: Skin is warm and dry  Psychiatric:   Tearful, mildly anxious    Nursing note and vitals reviewed        ED Medications  Medications   LORazepam (ATIVAN) tablet 1 mg (1 mg Oral Given 10/24/18 2013)   acetaminophen (TYLENOL) tablet 650 mg (650 mg Oral Given 10/24/18 2101) Diagnostic Studies  Results Reviewed     Procedure Component Value Units Date/Time    Urine culture [[id number]] Collected:  10/24/18 2110    Lab Status:  Final result Specimen:  Urine from Urine, Clean Catch Updated:  10/25/18 1757     Urine Culture <10,000 cfu/ml     Urine Microscopic [[id number]]  (Abnormal) Collected:  10/24/18 2110    Lab Status:  Final result Specimen:  Urine from Urine, Clean Catch Updated:  10/24/18 2133     RBC, UA 4-10 (A) /hpf      WBC, UA None Seen /hpf      Epithelial Cells None Seen /hpf      Bacteria, UA None Seen /hpf      Hyaline Casts, UA 3-5 (A) /lpf     Rapid drug screen, urine [[id number]]  (Abnormal) Collected:  10/24/18 2110    Lab Status:  Final result Specimen:  Urine from Urine, Clean Catch Updated:  10/24/18 2132     Amph/Meth UR Negative     Barbiturate Ur Negative     Benzodiazepine Urine Negative     Cocaine Urine Negative     Methadone Urine Negative     Opiate Urine Negative     PCP Ur Negative     THC Urine Positive (A)    Narrative:         Presumptive report  If requested, specimen will be sent to reference lab for confirmation  FOR MEDICAL PURPOSES ONLY  IF CONFIRMATION NEEDED PLEASE CONTACT THE LAB WITHIN 5 DAYS      Drug Screen Cutoff Levels:  AMPHETAMINE/METHAMPHETAMINES  1000 ng/mL  BARBITURATES     200 ng/mL  BENZODIAZEPINES     200 ng/mL  COCAINE      300 ng/mL  METHADONE      300 ng/mL  OPIATES      300 ng/mL  PHENCYCLIDINE     25 ng/mL  THC       50 ng/mL    POCT pregnancy, urine [[id number]]  (Normal) Resulted:  10/24/18 2107    Lab Status:  Final result Updated:  10/24/18 2112     EXT PREG TEST UR (Ref: Negative) Negative    POCT urinalysis dipstick [[id number]]  (Normal) Resulted:  10/24/18 2107    Lab Status:  Final result Specimen:  Urine Updated:  10/24/18 2111     Color, UA Yellow    ED Urine Macroscopic [[id number]]  (Abnormal) Collected:  10/24/18 2110    Lab Status:  Final result Specimen:  Urine Updated:  10/24/18 2107     Color, UA Yellow Clarity, UA Clear     pH, UA 6 0     Leukocytes, UA Negative     Nitrite, UA Negative     Protein, UA Negative mg/dl      Glucose, UA Negative mg/dl      Ketones, UA 40 (2+) (A) mg/dl      Urobilinogen, UA 0 2 E U /dl      Bilirubin, UA Negative     Blood, UA Trace (A)     Specific Gravity, UA >=1 030    Narrative:       CLINITEK RESULT    Protime-INR [[id number]]  (Normal) Collected:  10/24/18 2014    Lab Status:  Final result Specimen:  Blood from Arm, Right Updated:  10/24/18 2041     Protime 13 4 seconds      INR 1 01    APTT [[id number]]  (Normal) Collected:  10/24/18 2014    Lab Status:  Final result Specimen:  Blood from Arm, Right Updated:  10/24/18 2041     PTT 34 seconds     Comprehensive metabolic panel [[id number]] Collected:  10/24/18 1938    Lab Status:  Final result Specimen:  Blood from Arm, Left Updated:  10/24/18 2008     Sodium 140 mmol/L      Potassium 3 7 mmol/L      Chloride 107 mmol/L      CO2 27 mmol/L      ANION GAP 6 mmol/L      BUN 9 mg/dL      Creatinine 0 77 mg/dL      Glucose 82 mg/dL      Calcium 9 0 mg/dL      AST 18 U/L      ALT 22 U/L      Alkaline Phosphatase 63 U/L      Total Protein 7 5 g/dL      Albumin 4 0 g/dL      Total Bilirubin 0 30 mg/dL      eGFR 117 ml/min/1 73sq m     Narrative:         National Kidney Disease Education Program recommendations are as follows:  GFR calculation is accurate only with a steady state creatinine  Chronic Kidney disease less than 60 ml/min/1 73 sq  meters  Kidney failure less than 15 ml/min/1 73 sq  meters      Troponin I [[id number]]  (Normal) Collected:  10/24/18 1938    Lab Status:  Final result Specimen:  Blood from Arm, Left Updated:  10/24/18 2008     Troponin I <7 35 ng/mL     Salicylate level [[id number]]  (Normal) Collected:  10/24/18 1938    Lab Status:  Final result Specimen:  Blood from Arm, Left Updated:  70/63/74 7418     Salicylate Lvl 3 mg/dL     Acetaminophen level [[id number]]  (Abnormal) Collected:  10/24/18 1938    Lab Status:  Final result Specimen:  Blood from Arm, Left Updated:  10/24/18 2008     Acetaminophen Level <2 (L) ug/mL     Ethanol [[id number]]  (Normal) Collected:  10/24/18 1938    Lab Status:  Final result Specimen:  Blood from Arm, Left Updated:  10/24/18 2002     Ethanol Lvl <3 mg/dL     CBC and differential [[id number]]  (Abnormal) Collected:  10/24/18 1938    Lab Status:  Final result Specimen:  Blood from Arm, Left Updated:  10/24/18 1951     WBC 8 02 Thousand/uL      RBC 4 52 Million/uL      Hemoglobin 11 7 g/dL      Hematocrit 38 3 %      MCV 85 fL      MCH 25 9 (L) pg      MCHC 30 5 (L) g/dL      RDW 17 1 (H) %      MPV 10 8 fL      Platelets 271 Thousands/uL      nRBC 0 /100 WBCs      Neutrophils Relative 64 %      Immat GRANS % 0 %      Lymphocytes Relative 28 %      Monocytes Relative 7 %      Eosinophils Relative 1 %      Basophils Relative 0 %      Neutrophils Absolute 5 03 Thousands/µL      Immature Grans Absolute 0 03 Thousand/uL      Lymphocytes Absolute 2 26 Thousands/µL      Monocytes Absolute 0 59 Thousand/µL      Eosinophils Absolute 0 08 Thousand/µL      Basophils Absolute 0 03 Thousands/µL     Ammonia [[id number]] Collected:  10/24/18 1942    Lab Status:  No result Specimen:  Blood from Arm, Left                  XR chest 2 views   Final Result by Carissa Hernandez MD (10/24 2027)      No acute cardiopulmonary disease              Workstation performed: [id number]               Procedures  ECG 12 Lead Documentation  Date/Time: 10/24/2018 7:45 PM  Performed by: Taty Lopez  Authorized by: Taty Lopez     Indications / Diagnosis:  Overdose  Patient location:  ED  Previous ECG:     Previous ECG:  Unavailable  Rate:     ECG rate:  62    ECG rate assessment: normal    Rhythm:     Rhythm: sinus rhythm    Ectopy:     Ectopy: none    QRS:     QRS axis:  Normal    QRS intervals:  Normal  ST segments:     ST segments:  Normal  T waves:     T waves: normal              Phone Consults  ED Phone Contact    ED Course  ED Course as of Oct 26 0033   Wed Oct 24, 2018   2156 Spoke with crisis- will see patient     [address] at bedside                                 MDM  Number of Diagnoses or Management Options  Depression: new and requires workup  Misuse of over-the-counter medications [facility]): new and requires workup  Diagnosis management comments: Patient with medication misuse and depression  Will get labs, ekg, uds, coma panal and crisis eval      Patient seen and cleared for outpatient follow up by crisis  Will return for any new or worsening symptoms  Patient reevaluated and feels improved  Patient updated on results of tests  Discharge instructions given including follow-up, and return precautions  Patient demonstrates verbal understanding and agrees with plan         Amount and/or Complexity of Data Reviewed  Clinical lab tests: ordered and reviewed  Tests in the radiology section of CPT®: ordered and reviewed  Tests in the medicine section of CPT®: ordered and reviewed  Discussion of test results with the performing providers: yes  Decide to obtain previous medical records or to obtain history from someone other than the patient: yes  Obtain history from someone other than the patient: yes  Review and summarize past medical records: yes  Discuss the patient with other providers: yes  Independent visualization of images, tracings, or specimens: yes    Patient Progress  Patient progress: improved    CritCare Time    Disposition  Final diagnoses:   Misuse of over-the-counter medications ([address] )   Depression     Time reflects when diagnosis was documented in both MDM as applicable and the Disposition within this note     Time User Action Codes Description Comment    10/24/2018 11:30 PM Oj Connors Add [F19 10] Misuse of over-the-counter medications ([address] )     10/24/2018 11:30 PM Pravin Singh Add [F32 9] Depression       ED Disposition     ED Disposition Condition Comment    Discharge  [facility] Kari Morocho discharge to home/self care  Condition at discharge: Good        MD Documentation      Most Recent Value   Sending MD Dr Proctor       Follow-up Information     Follow up With Specialties Details Why Contact Info Additional Information    Maggy Campbell MD Family Medicine Call in 1 day For follow-up within 2-3 days to discuss your symptoms medication options  [address] [place] [address] Emergency Department Emergency Medicine Go to Immediately for any new or worsening symptoms  [address]  [phone]  ED, [address] [place] [address], [facility]    Resources that were given to you by the crisis worker  Call in 1 day For follow-up as soon as possible  Discharge Medication List as of 10/24/2018 11:49 PM      START taking these medications    Details   LORazepam (ATIVAN) 1 mg tablet Take 1 tablet (1 mg total) by mouth 3 (three) times a day as needed for anxiety, Starting Wed 10/24/2018, Print           No discharge procedures on file  ED Provider  Attending physically available and evaluated Renetta Weems I managed the patient along with the ED Attending      Electronically Signed by         Ivan Shrestha DO  10/26/18 9676

## 2018-10-25 LAB
ATRIAL RATE: 62 BPM
BACTERIA UR CULT: NORMAL
P AXIS: 52 DEGREES
PR INTERVAL: 190 MS
QRS AXIS: 60 DEGREES
QRSD INTERVAL: 98 MS
QT INTERVAL: 386 MS
QTC INTERVAL: 391 MS
T WAVE AXIS: 48 DEGREES
VENTRICULAR RATE: 62 BPM

## 2018-10-25 PROCEDURE — 93010 ELECTROCARDIOGRAM REPORT: CPT | Performed by: INTERNAL MEDICINE
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
## 2018-10-25 NOTE — DISCHARGE INSTRUCTIONS
Depression   WHAT YOU NEED TO KNOW:   Depression is a medical condition that causes feelings of sadness or hopelessness that do not go away  Depression may cause you to lose interest in things you used to enjoy  These feelings may interfere with your daily life  DISCHARGE INSTRUCTIONS:   Call 911 for any of the following:   · You think about harming yourself or someone else  Contact your healthcare provider if:   · Your symptoms do not improve  · You cannot make it to your next appointment  · You have new symptoms  · You have questions or concerns about your condition or care  Medicines:   · Antidepressants  may be given to improve or balance your mood  You may need to take this medicine for several weeks before you begin to feel better  · Take your medicine as directed  Contact your healthcare provider if you think your medicine is not helping or if you have side effects  Tell him of her if you are allergic to any medicine  Keep a list of the medicines, vitamins, and herbs you take  Include the amounts, and when and why you take them  Bring the list or the pill bottles to follow-up visits  Carry your medicine list with you in case of an emergency  Therapy  may be used to treat your depression  A therapist will help you learn to cope with your thoughts and feelings  This can be done alone or in a group  It may also be done with family members or a significant other  Self-care:   · Get regular physical activity  Try to exercise for 30 minutes, 3 to 5 days a week  Work with your healthcare provider to develop an exercise plan that you enjoy  Physical activity may improve your symptoms  · Get enough sleep  Create a routine to help you relax before bed  You can listen to music, read, or do yoga  Try to go to bed and wake up at the same time every day  Sleep is important for emotional health  · Eat a variety of healthy foods from all of the food groups    A healthy meal plan is low in fat, salt, and added sugar  Ask your healthcare provider for more information about a meal plan that is right for you  · Do not drink alcohol or use drugs  Alcohol and drugs can make your symptoms worse  Follow up with your healthcare provider as directed: Your healthcare provider will monitor your progress at follow-up visits  He or she will also monitor your medicine if you take antidepressants  Your healthcare provider will ask if the medicine is helping  Tell him or her about any side effects or problems you may have with your medicine  The type or amount of medicine may need to be changed  Write down your questions so you remember to ask them during your visits  © 2017 [employer] [employer] Information is for End User's use only and may not be sold, redistributed or otherwise used for commercial purposes  All illustrations and images included in CareNotes® are the copyrighted property of [employer]  or Dominic Andre  The above information is an  only  It is not intended as medical advice for individual conditions or treatments  Talk to your doctor, nurse or pharmacist before following any medical regimen to see if it is safe and effective for you

## 2018-10-25 NOTE — ED ATTENDING ATTESTATION
Belkis Cifuentes MD, saw and evaluated the patient  I have discussed the patient with the resident/non-physician practitioner and agree with the resident's/non-physician practitioner's findings, Plan of Care, and MDM as documented in the resident's/non-physician practitioner's note, except where noted  All available labs and Radiology studies were reviewed  At this point I agree with the current assessment done in the Emergency Department  I have conducted an independent evaluation of this patient a history and physical is as follows:    26-year-old woman presents after medication misuse  Patient states that she took a total of 8 Advil p m  Over a 2 hour period prior to arrival   She denies any self-harm attempts and states that she just wanted to be able to get to sleep tonight  She does endorse worsening depression of late  Denies any suicidal or homicidal ideation  Denies any abdominal pain, nausea, vomiting  The patient is complaining of chest pain described as pressure, she states that she has this frequently and has had previous cardiac workup  No associated dyspnea radiation of pain  Vital signs reviewed  On examination the patient is awake and alert, in no acute distress  Head is atraumatic and normocephalic  Pupils equal and reactive bilaterally, normal conjunctiva  Oropharynx clear  Moist mucous membranes  Neck is supple  Heart regular rate and rhythm, no murmurs, rubs or gallops  Lungs clear to auscultation bilaterally with no respiratory distress  Abdomen soft, nontender, nondistended  Extremities with no edema and normal range of motion  Nonfocal neuro  Skin warm, dry, intact  Patient is tearful, mildly anxious  Assessment and plan:  26-year-old woman with depression and medication misuse  Denies suicide attempt, suicidal ideation  Also complaining of chest pain  Plan EKG, labs, crisis consult        Critical Care Time  CritCare Time    Procedures

## 2019-01-28 ENCOUNTER — PATIENT OUTREACH (OUTPATIENT)
Dept: FAMILY MEDICINE CLINIC | Facility: CLINIC | Age: 34
End: 2019-01-28

## 2019-01-28 ENCOUNTER — APPOINTMENT (OUTPATIENT)
Dept: LAB | Facility: HOSPITAL | Age: 34
End: 2019-01-28
Payer: COMMERCIAL

## 2019-01-28 ENCOUNTER — OFFICE VISIT (OUTPATIENT)
Dept: FAMILY MEDICINE CLINIC | Facility: CLINIC | Age: 34
End: 2019-01-28

## 2019-01-28 VITALS
HEART RATE: 99 BPM | TEMPERATURE: 97.6 F | BODY MASS INDEX: 34.41 KG/M2 | DIASTOLIC BLOOD PRESSURE: 80 MMHG | SYSTOLIC BLOOD PRESSURE: 136 MMHG | WEIGHT: 187 LBS | RESPIRATION RATE: 18 BRPM | OXYGEN SATURATION: 99 % | HEIGHT: 62 IN

## 2019-01-28 DIAGNOSIS — R00.2 PALPITATIONS: ICD-10-CM

## 2019-01-28 DIAGNOSIS — F41.9 ANXIETY: Primary | ICD-10-CM

## 2019-01-28 DIAGNOSIS — R07.9 CHEST PAIN, UNSPECIFIED TYPE: ICD-10-CM

## 2019-01-28 PROBLEM — F12.10 MARIJUANA ABUSE: Status: ACTIVE | Noted: 2019-01-28

## 2019-01-28 LAB
ALBUMIN SERPL BCP-MCNC: 3.9 G/DL (ref 3–5.2)
ALP SERPL-CCNC: 50 U/L (ref 43–122)
ALT SERPL W P-5'-P-CCNC: 22 U/L (ref 9–52)
ANION GAP SERPL CALCULATED.3IONS-SCNC: 4 MMOL/L (ref 5–14)
AST SERPL W P-5'-P-CCNC: 25 U/L (ref 14–36)
BASOPHILS # BLD AUTO: 0.1 THOUSANDS/ΜL (ref 0–0.1)
BASOPHILS NFR BLD AUTO: 1 % (ref 0–1)
BILIRUB SERPL-MCNC: 0.3 MG/DL
BUN SERPL-MCNC: 11 MG/DL (ref 5–25)
CALCIUM SERPL-MCNC: 9.5 MG/DL (ref 8.4–10.2)
CHLORIDE SERPL-SCNC: 106 MMOL/L (ref 97–108)
CO2 SERPL-SCNC: 31 MMOL/L (ref 22–30)
CREAT SERPL-MCNC: 0.88 MG/DL (ref 0.6–1.2)
EOSINOPHIL # BLD AUTO: 0.1 THOUSAND/ΜL (ref 0–0.4)
EOSINOPHIL NFR BLD AUTO: 2 % (ref 0–6)
ERYTHROCYTE [DISTWIDTH] IN BLOOD BY AUTOMATED COUNT: 16.9 %
GFR SERPL CREATININE-BSD FRML MDRD: 98 ML/MIN/1.73SQ M
GLUCOSE SERPL-MCNC: 95 MG/DL (ref 70–99)
HCT VFR BLD AUTO: 34.6 % (ref 36–46)
HGB BLD-MCNC: 10.9 G/DL (ref 12–16)
LYMPHOCYTES # BLD AUTO: 1.8 THOUSANDS/ΜL (ref 0.5–4)
LYMPHOCYTES NFR BLD AUTO: 30 % (ref 25–45)
MCH RBC QN AUTO: 26.4 PG (ref 26–34)
MCHC RBC AUTO-ENTMCNC: 31.4 G/DL (ref 31–36)
MCV RBC AUTO: 84 FL (ref 80–100)
MONOCYTES # BLD AUTO: 0.4 THOUSAND/ΜL (ref 0.2–0.9)
MONOCYTES NFR BLD AUTO: 7 % (ref 1–10)
NEUTROPHILS # BLD AUTO: 3.7 THOUSANDS/ΜL (ref 1.8–7.8)
NEUTS SEG NFR BLD AUTO: 60 % (ref 45–65)
PLATELET # BLD AUTO: 293 THOUSANDS/UL (ref 150–450)
PMV BLD AUTO: 9 FL (ref 8.9–12.7)
POTASSIUM SERPL-SCNC: 4.4 MMOL/L (ref 3.6–5)
PROT SERPL-MCNC: 7 G/DL (ref 5.9–8.4)
RBC # BLD AUTO: 4.12 MILLION/UL (ref 4–5.2)
SODIUM SERPL-SCNC: 141 MMOL/L (ref 137–147)
TSH SERPL DL<=0.05 MIU/L-ACNC: 3.62 UIU/ML (ref 0.47–4.68)
WBC # BLD AUTO: 6.1 THOUSAND/UL (ref 4.5–11)

## 2019-01-28 PROCEDURE — 85025 COMPLETE CBC W/AUTO DIFF WBC: CPT

## 2019-01-28 PROCEDURE — 84443 ASSAY THYROID STIM HORMONE: CPT

## 2019-01-28 PROCEDURE — 93000 ELECTROCARDIOGRAM COMPLETE: CPT | Performed by: FAMILY MEDICINE

## 2019-01-28 PROCEDURE — 99213 OFFICE O/P EST LOW 20 MIN: CPT | Performed by: FAMILY MEDICINE

## 2019-01-28 PROCEDURE — 36415 COLL VENOUS BLD VENIPUNCTURE: CPT

## 2019-01-28 PROCEDURE — 80053 COMPREHEN METABOLIC PANEL: CPT

## 2019-01-28 PROCEDURE — 3008F BODY MASS INDEX DOCD: CPT | Performed by: FAMILY MEDICINE

## 2019-01-28 RX ORDER — SERTRALINE HYDROCHLORIDE 25 MG/1
25 TABLET, FILM COATED ORAL DAILY
Qty: 30 TABLET | Refills: 1 | Status: SHIPPED | OUTPATIENT
Start: 2019-01-28 | End: 2020-09-12

## 2019-01-28 RX ORDER — SERTRALINE HYDROCHLORIDE 25 MG/1
25 TABLET, FILM COATED ORAL DAILY
Qty: 30 TABLET | Refills: 1 | Status: SHIPPED | OUTPATIENT
Start: 2019-01-28 | End: 2019-01-28 | Stop reason: SDUPTHER

## 2019-01-28 NOTE — ASSESSMENT & PLAN NOTE
Used to be on Lexapro before however she stopped it states it did not work for her  Requested Ativan however I explained to her that she needs a psychiatrist for that  She agreed to speak with our   Most likely  anxiety is the source of her chest pain  Will start Zoloft 25 mg daily  Counseled extensively about side effects including suicidal ideations    Encouraged patient to call 911 or go to ER if that happens is she verbalized understanding

## 2019-01-28 NOTE — ASSESSMENT & PLAN NOTE
Advised against marijuana     Explained to patient that it could be contributing to her chest pain and she needs to quit    She verbalized understanding

## 2019-01-28 NOTE — Clinical Note
Hi! This patient has a known diagnosis of anxiety but doesn't follow up with psych   I started her on medications but I wopsyuld like to see if you can see her for psychotherapy

## 2019-01-28 NOTE — ASSESSMENT & PLAN NOTE
Most secondary to an anxiety  Had multiple visits to ER for same thing  Continues to use marijuana  Advised against it and explained to her that it could be contributing to her symptoms  EKG in the office showed normal sinus rhythm with T-wave wave inversions in leads V1 and V2 which was present on previous EKG  Also her previous EKG from October showed sinus arrhythmia? Although I explained to her that it is mostly related to her anxiety however  Patient is insisting on referral to Cardiology as she wanted to be reassured

## 2019-01-28 NOTE — PROGRESS NOTES
Assessment/Plan:    Marijuana abuse  Advised against marijuana     Explained to patient that it could be contributing to her chest pain and she needs to quit  She verbalized understanding    Anxiety  Used to be on Lexapro before however she stopped it states it did not work for her  Requested Ativan however I explained to her that she needs a psychiatrist for that  She agreed to speak with our   Most likely  anxiety is the source of her chest pain  Will start Zoloft 25 mg daily  Counseled extensively about side effects including suicidal ideations  Encouraged patient to call 911 or go to ER if that happens is she verbalized understanding    Chest pain  Most secondary to an anxiety  Had multiple visits to ER for same thing  Continues to use marijuana  Advised against it and explained to her that it could be contributing to her symptoms  EKG in the office showed normal sinus rhythm with T-wave wave inversions in leads V1 and V2 which was present on previous EKG  Also her previous EKG from October showed sinus arrhythmia? Although I explained to her that it is mostly related to her anxiety however  Patient is insisting on referral to Cardiology as she wanted to be reassured  Diagnoses and all orders for this visit:    Anxiety  -     Ambulatory referral to social work care management program; Future  -     Discontinue: sertraline (ZOLOFT) 25 mg tablet; Take 1 tablet (25 mg total) by mouth daily  -     sertraline (ZOLOFT) 25 mg tablet; Take 1 tablet (25 mg total) by mouth daily    Chest pain, unspecified type  -     POCT ECG  -     Echo stress test w contrast if indicated; Future  -     Ambulatory referral to Cardiology; Future    Palpitations  -     TSH, 3rd generation with Free T4 reflex; Future  -     CBC and differential; Future  -     Comprehensive metabolic panel; Future    Other orders  -     Cancel: Holter monitor - 48 hour;  Future          Subjective:      Patient ID: Arabella Riley Dewayne Medrano is a 26 y o  female  Chest Pain    This is a chronic problem  The current episode started more than 1 year ago  The onset quality is undetermined  The problem occurs 2 to 4 times per day  The problem has been gradually worsening  The pain is present in the lateral region  The pain is at a severity of 7/10  The quality of the pain is described as numbness  The pain radiates to the left shoulder and left arm  Associated symptoms include numbness  Pertinent negatives include no abdominal pain, cough, dizziness, fever, headaches or shortness of breath  The pain is aggravated by nothing  She has tried NSAIDs and nitroglycerin for the symptoms  The treatment provided no relief  Risk factors include stress, smoking/tobacco exposure and substance abuse  Her past medical history is significant for anxiety/panic attacks  Pertinent negatives for past medical history include no seizures  The following portions of the patient's history were reviewed and updated as appropriate: allergies, current medications, past family history, past medical history, past social history, past surgical history and problem list     Review of Systems   Constitutional: Negative for chills, fatigue and fever  HENT: Negative for sinus pressure and sore throat  Eyes: Negative for photophobia, pain and visual disturbance  Respiratory: Negative for cough, chest tightness and shortness of breath  Cardiovascular: Positive for chest pain  Negative for leg swelling  Gastrointestinal: Negative for abdominal pain and diarrhea  Genitourinary: Negative for dysuria, frequency and urgency  Musculoskeletal: Negative for arthralgias and myalgias  Skin: Negative for rash  Neurological: Positive for numbness  Negative for dizziness, seizures, syncope and headaches  Hematological: Negative for adenopathy  Psychiatric/Behavioral: The patient is nervous/anxious            Objective:      /80 (BP Location: Right arm, Patient Position: Sitting, Cuff Size: Standard)   Pulse 99   Temp 97 6 °F (36 4 °C) (Temporal)   Resp 18   Ht 5' 2" (1 575 m)   Wt 84 8 kg (187 lb)   LMP 08/28/2018 (Approximate)   SpO2 99%   BMI 34 20 kg/m²          Physical Exam   Constitutional: She is oriented to person, place, and time  She appears well-developed and well-nourished  HENT:   Head: Normocephalic and atraumatic  Mouth/Throat: No oropharyngeal exudate  Eyes: Pupils are equal, round, and reactive to light  Conjunctivae and EOM are normal  Right eye exhibits no discharge  Left eye exhibits no discharge  Neck: Normal range of motion  Neck supple  No JVD present  No thyromegaly present  Cardiovascular: Normal rate, regular rhythm and normal heart sounds  No murmur heard  Pulmonary/Chest: Effort normal and breath sounds normal  No respiratory distress  She has no wheezes  She has no rales  Abdominal: Soft  Bowel sounds are normal  She exhibits no distension  There is no tenderness  Musculoskeletal: Normal range of motion  She exhibits no edema or deformity  Neurological: She is alert and oriented to person, place, and time  Skin: Skin is warm and dry  Vitals reviewed

## 2019-01-29 ENCOUNTER — TELEPHONE (OUTPATIENT)
Dept: FAMILY MEDICINE CLINIC | Facility: CLINIC | Age: 34
End: 2019-01-29

## 2019-01-29 NOTE — PROGRESS NOTES
Dr Alanna Fernandes spoke to this LSW about pt needing MH services  LSW encouraged pt to speak with Dr Mayra Talamantes, as pt currently has no MH services and pt should be referred to [facility]

## 2019-01-31 ENCOUNTER — TELEPHONE (OUTPATIENT)
Dept: FAMILY MEDICINE CLINIC | Facility: CLINIC | Age: 34
End: 2019-01-31

## 2019-01-31 NOTE — TELEPHONE ENCOUNTER
[address] has reviewed the request for a(n) Stress Echocardiogram (pictures  of inside your heart) submitted by Dr Fatmata Duque on January 29, 2019   After  physician review, the request is:  Denied completely because: Based on the Physician Reviewers clinical  judgment, the request for a(n) Stress Echocardiogram was determined to be not  medically necessary

## 2019-02-14 DIAGNOSIS — Z78.9 NEED FOR FOLLOW-UP BY SOCIAL WORKER: Primary | ICD-10-CM

## 2019-03-05 ENCOUNTER — TELEPHONE (OUTPATIENT)
Dept: FAMILY MEDICINE CLINIC | Facility: CLINIC | Age: 34
End: 2019-03-05

## 2019-03-05 DIAGNOSIS — R07.9 CHEST PAIN, UNSPECIFIED TYPE: Primary | ICD-10-CM

## 2019-03-05 NOTE — TELEPHONE ENCOUNTER
[facility] called to inform you echo stress was denied by insurance and regular stress test was suggested  They are canceling PT's appointment for echo stress test tomorrow  I will call pt and let her know appointment is canceled and that we will call her when reg stress test is ordered if you agree?

## 2019-03-21 ENCOUNTER — OFFICE VISIT (OUTPATIENT)
Dept: OBGYN CLINIC | Facility: CLINIC | Age: 34
End: 2019-03-21

## 2019-03-21 VITALS — WEIGHT: 191 LBS | BODY MASS INDEX: 34.93 KG/M2 | SYSTOLIC BLOOD PRESSURE: 120 MMHG | DIASTOLIC BLOOD PRESSURE: 80 MMHG

## 2019-03-21 DIAGNOSIS — N76.0 BACTERIAL VAGINOSIS: ICD-10-CM

## 2019-03-21 DIAGNOSIS — B96.89 BACTERIAL VAGINOSIS: ICD-10-CM

## 2019-03-21 DIAGNOSIS — N89.8 VAGINAL DISCHARGE: Primary | ICD-10-CM

## 2019-03-21 LAB — SL AMB POCT WET MOUNT: ABNORMAL

## 2019-03-21 PROCEDURE — 99213 OFFICE O/P EST LOW 20 MIN: CPT | Performed by: FAMILY MEDICINE

## 2019-03-21 PROCEDURE — 87210 SMEAR WET MOUNT SALINE/INK: CPT | Performed by: FAMILY MEDICINE

## 2019-03-21 PROCEDURE — 87591 N.GONORRHOEAE DNA AMP PROB: CPT | Performed by: FAMILY MEDICINE

## 2019-03-21 PROCEDURE — 87491 CHLMYD TRACH DNA AMP PROBE: CPT | Performed by: FAMILY MEDICINE

## 2019-03-21 RX ORDER — METRONIDAZOLE 500 MG/1
500 TABLET ORAL EVERY 12 HOURS SCHEDULED
Qty: 14 TABLET | Refills: 0 | Status: SHIPPED | OUTPATIENT
Start: 2019-03-21 | End: 2019-03-28

## 2019-03-21 NOTE — PROGRESS NOTES
Assessment/Plan:     Vaginal discharge  - pH > 4 5 Wet mount with clue cells s/o BV  - GC/CT probe taken  Counseled on safe sexual practices   - Avoid excess douching  Use soap only on external genitalia  - Reviewed PAP smear results from 9/2018  F/u for colposcopy     Bacterial vaginosis  - Flagyl 500 mg BID x 7 days  - Avoid taking alcohol with flagyl           Diagnoses and all orders for this visit:    Vaginal discharge  -     Chlamydia/GC amplified DNA by PCR; Future    Bacterial vaginosis  -     metroNIDAZOLE (FLAGYL) 500 mg tablet; Take 1 tablet (500 mg total) by mouth every 12 (twelve) hours for 7 days              Subjective:      Patient ID: Patricia Monroe is a 26 y o  female  26 y o female presents with C/O vaginal discharge, white for the past 2-3 days  Denies any foul odor  Does have some irritation  Denies any vaginal bleeding/ post coital bleeding/ dyspareunia/ dysuria/ hematuria/ lower abdomen pain/ fever/ chills  Has had a new sexual partner  Concerned with GC/CT  Partner status unknown  Last PAP smear 9/21/2018 noted for ASCUS with HPV positive  The following portions of the patient's history were reviewed and updated as appropriate: allergies, current medications, past family history, past medical history, past social history, past surgical history and problem list     Review of Systems   Constitutional: Negative for chills and fever  Gastrointestinal: Negative for abdominal pain, diarrhea, nausea and vomiting  Genitourinary: Positive for vaginal discharge  Negative for difficulty urinating, dyspareunia, dysuria, frequency, hematuria, menstrual problem, pelvic pain, vaginal bleeding and vaginal pain  Skin: Negative for rash  Hematological: Negative for adenopathy  Objective:      /80   Wt 86 6 kg (191 lb)   LMP 02/18/2019 (Exact Date)   BMI 34 93 kg/m²          Physical Exam   Abdominal: Soft  She exhibits no distension  There is no tenderness  There is no guarding  Genitourinary: Pelvic exam was performed with patient supine  No labial fusion  There is no rash, tenderness, lesion or injury on the right labia  There is no rash, tenderness, lesion or injury on the left labia  Cervix exhibits no friability  No erythema, tenderness or bleeding in the vagina  No foreign body in the vagina  No signs of injury around the vagina  Vaginal discharge found  Vitals reviewed

## 2019-03-21 NOTE — ASSESSMENT & PLAN NOTE
- pH > 4 5 Wet mount with clue cells s/o BV  - GC/CT probe taken  Counseled on safe sexual practices   - Avoid excess douching  Use soap only on external genitalia  - Reviewed PAP smear results from 9/2018   F/u for colposcopy

## 2019-03-22 ENCOUNTER — TELEPHONE (OUTPATIENT)
Dept: OBGYN CLINIC | Facility: CLINIC | Age: 34
End: 2019-03-22

## 2019-03-22 LAB
C TRACH DNA SPEC QL NAA+PROBE: NEGATIVE
N GONORRHOEA DNA SPEC QL NAA+PROBE: NEGATIVE

## 2019-03-25 ENCOUNTER — TELEPHONE (OUTPATIENT)
Dept: OBGYN CLINIC | Facility: CLINIC | Age: 34
End: 2019-03-25

## 2019-03-26 ENCOUNTER — APPOINTMENT (EMERGENCY)
Dept: RADIOLOGY | Facility: HOSPITAL | Age: 34
End: 2019-03-26
Payer: COMMERCIAL

## 2019-03-26 ENCOUNTER — HOSPITAL ENCOUNTER (EMERGENCY)
Facility: HOSPITAL | Age: 34
Discharge: HOME | End: 2019-03-26
Attending: EMERGENCY MEDICINE
Payer: COMMERCIAL

## 2019-03-26 VITALS
OXYGEN SATURATION: 99 % | DIASTOLIC BLOOD PRESSURE: 81 MMHG | RESPIRATION RATE: 20 BRPM | WEIGHT: 190 LBS | HEART RATE: 68 BPM | SYSTOLIC BLOOD PRESSURE: 118 MMHG | BODY MASS INDEX: 34.96 KG/M2 | HEIGHT: 62 IN | TEMPERATURE: 97.9 F

## 2019-03-26 DIAGNOSIS — R55 SYNCOPE: Primary | ICD-10-CM

## 2019-03-26 LAB
ALBUMIN SERPL BCP-MCNC: 3.8 G/DL (ref 3.5–5)
ALP SERPL-CCNC: 62 U/L (ref 46–116)
ALT SERPL W P-5'-P-CCNC: 17 U/L (ref 12–78)
ANION GAP SERPL CALCULATED.3IONS-SCNC: 3 MMOL/L (ref 4–13)
AST SERPL W P-5'-P-CCNC: 15 U/L (ref 5–45)
ATRIAL RATE: 65 BPM
ATRIAL RATE: 74 BPM
BASOPHILS # BLD AUTO: 0.02 THOUSANDS/ΜL (ref 0–0.1)
BASOPHILS NFR BLD AUTO: 0 % (ref 0–1)
BILIRUB SERPL-MCNC: 0.14 MG/DL (ref 0.2–1)
BUN SERPL-MCNC: 10 MG/DL (ref 5–25)
CALCIUM SERPL-MCNC: 8.6 MG/DL (ref 8.3–10.1)
CHLORIDE SERPL-SCNC: 106 MMOL/L (ref 100–108)
CO2 SERPL-SCNC: 26 MMOL/L (ref 21–32)
CREAT SERPL-MCNC: 0.87 MG/DL (ref 0.6–1.3)
EOSINOPHIL # BLD AUTO: 0.11 THOUSAND/ΜL (ref 0–0.61)
EOSINOPHIL NFR BLD AUTO: 2 % (ref 0–6)
ERYTHROCYTE [DISTWIDTH] IN BLOOD BY AUTOMATED COUNT: 15.5 % (ref 11.6–15.1)
GFR SERPL CREATININE-BSD FRML MDRD: 100 ML/MIN/1.73SQ M
GLUCOSE SERPL-MCNC: 88 MG/DL (ref 65–140)
HCT VFR BLD AUTO: 37.6 % (ref 34.8–46.1)
HGB BLD-MCNC: 11.5 G/DL (ref 11.5–15.4)
IMM GRANULOCYTES # BLD AUTO: 0.01 THOUSAND/UL (ref 0–0.2)
IMM GRANULOCYTES NFR BLD AUTO: 0 % (ref 0–2)
LYMPHOCYTES # BLD AUTO: 1.67 THOUSANDS/ΜL (ref 0.6–4.47)
LYMPHOCYTES NFR BLD AUTO: 35 % (ref 14–44)
MCH RBC QN AUTO: 26.4 PG (ref 26.8–34.3)
MCHC RBC AUTO-ENTMCNC: 30.6 G/DL (ref 31.4–37.4)
MCV RBC AUTO: 86 FL (ref 82–98)
MONOCYTES # BLD AUTO: 0.54 THOUSAND/ΜL (ref 0.17–1.22)
MONOCYTES NFR BLD AUTO: 11 % (ref 4–12)
NEUTROPHILS # BLD AUTO: 2.38 THOUSANDS/ΜL (ref 1.85–7.62)
NEUTS SEG NFR BLD AUTO: 52 % (ref 43–75)
NRBC BLD AUTO-RTO: 0 /100 WBCS
P AXIS: 50 DEGREES
P AXIS: 52 DEGREES
PLATELET # BLD AUTO: 214 THOUSANDS/UL (ref 149–390)
PMV BLD AUTO: 9.9 FL (ref 8.9–12.7)
POTASSIUM SERPL-SCNC: 3.4 MMOL/L (ref 3.5–5.3)
PR INTERVAL: 198 MS
PR INTERVAL: 210 MS
PROT SERPL-MCNC: 8 G/DL (ref 6.4–8.2)
QRS AXIS: 2 DEGREES
QRS AXIS: 60 DEGREES
QRSD INTERVAL: 100 MS
QRSD INTERVAL: 96 MS
QT INTERVAL: 378 MS
QT INTERVAL: 382 MS
QTC INTERVAL: 393 MS
QTC INTERVAL: 424 MS
RBC # BLD AUTO: 4.35 MILLION/UL (ref 3.81–5.12)
SODIUM SERPL-SCNC: 135 MMOL/L (ref 136–145)
T WAVE AXIS: 29 DEGREES
T WAVE AXIS: 57 DEGREES
TROPONIN I SERPL-MCNC: <0.02 NG/ML
TROPONIN I SERPL-MCNC: <0.02 NG/ML
VENTRICULAR RATE: 65 BPM
VENTRICULAR RATE: 74 BPM
WBC # BLD AUTO: 4.73 THOUSAND/UL (ref 4.31–10.16)

## 2019-03-26 PROCEDURE — 36415 COLL VENOUS BLD VENIPUNCTURE: CPT

## 2019-03-26 PROCEDURE — 84484 ASSAY OF TROPONIN QUANT: CPT | Performed by: EMERGENCY MEDICINE

## 2019-03-26 PROCEDURE — 71046 X-RAY EXAM CHEST 2 VIEWS: CPT

## 2019-03-26 PROCEDURE — 93005 ELECTROCARDIOGRAM TRACING: CPT

## 2019-03-26 PROCEDURE — 85025 COMPLETE CBC W/AUTO DIFF WBC: CPT | Performed by: EMERGENCY MEDICINE

## 2019-03-26 PROCEDURE — 80053 COMPREHEN METABOLIC PANEL: CPT | Performed by: EMERGENCY MEDICINE

## 2019-03-26 PROCEDURE — 93010 ELECTROCARDIOGRAM REPORT: CPT | Performed by: INTERNAL MEDICINE

## 2019-03-26 PROCEDURE — 99284 EMERGENCY DEPT VISIT MOD MDM: CPT

## 2019-03-26 NOTE — ED NOTES
Pt helped herself to a turkey sandwich with out knowledge of the staff, did advise she should not eat or drink at this time  Pt is currently almost done eating the sandwich and resting comfortably        Juan Conti  03/26/19 0316

## 2019-03-26 NOTE — ED PROVIDER NOTES
History  Chief Complaint   Patient presents with    Syncope     Pt states had a syncopal episode while at work  Pt c/o lungs hurting and did have CP prior to episode  Pt states had same thing happen last year around this time     26year old female with syncopal episode at work  Was lifting a heavy load when she had chest and head ache, then "passed out" for, what bystanders describe as 5 minutes  Describes the chest pain as tightness, headache as generalized pressure sensation  When she awoke she had no headache but chest pressure persisted  She is concerned about her heart given a Hx of elevated troponin in the past  Denies SOB, Palpitations  No recent fever or chills, no cough, no leg swelling  No recent hospitalization           Prior to Admission Medications   Prescriptions Last Dose Informant Patient Reported? Taking?   metroNIDAZOLE (FLAGYL) 500 mg tablet 3/26/2019 at Unknown time  No Yes   Sig: Take 1 tablet (500 mg total) by mouth every 12 (twelve) hours for 7 days   sertraline (ZOLOFT) 25 mg tablet Past Month at Unknown time  No Yes   Sig: Take 1 tablet (25 mg total) by mouth daily   Patient taking differently: Take 25 mg by mouth as needed       Facility-Administered Medications: None       Past Medical History:   Diagnosis Date    Anxiety     Heart problem     swelling around her heart       Past Surgical History:   Procedure Laterality Date    CERVICAL BIOPSY       SECTION         History reviewed  No pertinent family history  I have reviewed and agree with the history as documented  Social History     Tobacco Use    Smoking status: Former Smoker     Types: Cigars    Smokeless tobacco: Never Used   Substance Use Topics    Alcohol use: Yes     Comment: socially    Drug use: Yes     Types: Marijuana     Comment: occ  Review of Systems   Constitutional: Negative for appetite change, chills, fatigue and fever  HENT: Negative for sneezing and sore throat      Eyes: Negative for visual disturbance  Respiratory: Positive for chest tightness  Negative for cough, choking, shortness of breath and wheezing  Cardiovascular: Negative for chest pain and palpitations  Gastrointestinal: Negative for abdominal pain, constipation, diarrhea, nausea and vomiting  Genitourinary: Negative for difficulty urinating and dysuria  Neurological: Positive for syncope  Negative for dizziness, weakness, light-headedness, numbness and headaches  All other systems reviewed and are negative  Physical Exam  ED Triage Vitals   Temperature Pulse Respirations Blood Pressure SpO2   03/26/19 1904 03/26/19 1340 03/26/19 1340 03/26/19 1340 03/26/19 1340   97 9 °F (36 6 °C) 73 20 145/63 100 %      Temp Source Heart Rate Source Patient Position - Orthostatic VS BP Location FiO2 (%)   03/26/19 1904 03/26/19 1340 03/26/19 1340 03/26/19 1340 --   Oral Monitor Sitting Left arm       Pain Score       03/26/19 1340       8             Orthostatic Vital Signs  Vitals:    03/26/19 1545 03/26/19 1618 03/26/19 1731 03/26/19 1838   BP: 132/86 129/76 113/68 118/81   Pulse: 76 78 71 68   Patient Position - Orthostatic VS: Sitting Sitting Lying Lying       Physical Exam   Constitutional: She is oriented to person, place, and time  She appears well-developed and well-nourished  No distress  HENT:   Head: Normocephalic and atraumatic  Mouth/Throat: Oropharynx is clear and moist    Eyes: Pupils are equal, round, and reactive to light  EOM are normal    Neck: No JVD present  No tracheal deviation present  Cardiovascular: Normal rate, regular rhythm, normal heart sounds and intact distal pulses  Exam reveals no gallop and no friction rub  No murmur heard  Pulmonary/Chest: Effort normal and breath sounds normal  No respiratory distress  She has no wheezes  She has no rales  Abdominal: Soft  Bowel sounds are normal  She exhibits no distension  There is no tenderness  There is no rebound and no guarding     Neurological: She is alert and oriented to person, place, and time  No cranial nerve deficit  She exhibits normal muscle tone  Skin: Skin is warm and dry  She is not diaphoretic  No pallor  Psychiatric: She has a normal mood and affect  Her behavior is normal    Nursing note and vitals reviewed  ED Medications  Medications - No data to display    Diagnostic Studies  Results Reviewed     Procedure Component Value Units Date/Time    Troponin I [[id number]]  (Normal) Collected:  03/26/19 1737    Lab Status:  Final result Specimen:  Blood from Arm, Left Updated:  03/26/19 1811     Troponin I <0 02 ng/mL     Troponin I [[id number]]  (Normal) Collected:  03/26/19 1411    Lab Status:  Final result Specimen:  Blood from Arm, Right Updated:  03/26/19 1441     Troponin I <0 02 ng/mL     Comprehensive metabolic panel [[id number]]  (Abnormal) Collected:  03/26/19 1411    Lab Status:  Final result Specimen:  Blood from Arm, Right Updated:  03/26/19 1441     Sodium 135 mmol/L      Potassium 3 4 mmol/L      Chloride 106 mmol/L      CO2 26 mmol/L      ANION GAP 3 mmol/L      BUN 10 mg/dL      Creatinine 0 87 mg/dL      Glucose 88 mg/dL      Calcium 8 6 mg/dL      AST 15 U/L      ALT 17 U/L      Alkaline Phosphatase 62 U/L      Total Protein 8 0 g/dL      Albumin 3 8 g/dL      Total Bilirubin 0 14 mg/dL      eGFR 100 ml/min/1 73sq m     Narrative:       National Kidney Disease Education Program recommendations are as follows:  GFR calculation is accurate only with a steady state creatinine  Chronic Kidney disease less than 60 ml/min/1 73 sq  meters  Kidney failure less than 15 ml/min/1 73 sq  meters      CBC and differential [[id number]]  (Abnormal) Collected:  03/26/19 1411    Lab Status:  Final result Specimen:  Blood from Arm, Right Updated:  03/26/19 1421     WBC 4 73 Thousand/uL      RBC 4 35 Million/uL      Hemoglobin 11 5 g/dL      Hematocrit 37 6 %      MCV 86 fL      MCH 26 4 pg      MCHC 30 6 g/dL      RDW 15 5 %      MPV 9 9 fL Platelets 616 Thousands/uL      nRBC 0 /100 WBCs      Neutrophils Relative 52 %      Immat GRANS % 0 %      Lymphocytes Relative 35 %      Monocytes Relative 11 %      Eosinophils Relative 2 %      Basophils Relative 0 %      Neutrophils Absolute 2 38 Thousands/µL      Immature Grans Absolute 0 01 Thousand/uL      Lymphocytes Absolute 1 67 Thousands/µL      Monocytes Absolute 0 54 Thousand/µL      Eosinophils Absolute 0 11 Thousand/µL      Basophils Absolute 0 02 Thousands/µL                  XR chest 2 views   ED Interpretation by Marli Carmichael MD (03/26 1724)   No active pulmonary disease      Final Result by Sejal Morin MD (03/27 0801)      No acute cardiopulmonary disease  Workstation performed: [id number]               Procedures  Procedures      Phone Consults  ED Phone Contact    ED Course                               MDM  Number of Diagnoses or Management Options  Syncope:   Diagnosis management comments: 78-year-old female chest pain and syncope  The patient has a reassuring exam here  She is PERC negative here  Given the history of this patient's syncopal episode it is not likely to be cardiac in nature though given her history of cardiac problems in the past will perform a cardiac screen here  The patient is concerned that she has not gotten outpatient echo  I reviewed with her that she did have 1 last April and that it was overall largely unremarkable  The patient is asymptomatic now  With an unremarkable workup for the recommend PCP follow-up      Disposition  Final diagnoses:   Syncope     Time reflects when diagnosis was documented in both MDM as applicable and the Disposition within this note     Time User Action Codes Description Comment    3/26/2019  7:07 PM Maciej, [address] [place] [address] [[id number]] Syncope       ED Disposition     ED Disposition Condition Date/Time Comment    Discharge Stable Tue Mar 26, 2019  7:07 PM Emilia Putnam discharge to home/self care              Follow-up Information    None         Discharge Medication List as of 3/26/2019  7:08 PM      CONTINUE these medications which have NOT CHANGED    Details   metroNIDAZOLE (FLAGYL) 500 mg tablet Take 1 tablet (500 mg total) by mouth every 12 (twelve) hours for 7 days, Starting Thu 3/21/2019, Until Thu 3/28/2019, Normal      sertraline (ZOLOFT) 25 mg tablet Take 1 tablet (25 mg total) by mouth daily, Starting Mon 1/28/2019, Normal           No discharge procedures on file  ED Provider  Attending physically available and evaluated Corky Leonard I managed the patient along with the ED Attending      Electronically Signed by         Brad Remy MD  03/29/19 5833

## 2019-03-26 NOTE — ED ATTENDING ATTESTATION
Jhoan Estevez DO, saw and evaluated the patient  I have discussed the patient with the resident/non-physician practitioner and agree with the resident's/non-physician practitioner's findings, Plan of Care, and MDM as documented in the resident's/non-physician practitioner's note, except where noted  All available labs and Radiology studies were reviewed  I was present for key portions of any procedure(s) performed by the resident/non-physician practitioner and I was immediately available to provide assistance  At this point I agree with the current assessment done in the Emergency Department  I have conducted an independent evaluation of this patient a history and physical is as follows:      27 yo female c/o band like distrubution of chest pressure followed by syncope  Had similar episode remotely and was evaluated, had a troponin of 0 09, had an echo showing concentric hypertrophy but didn't have follow up after that  Echo from 4/16/18 reviewed - EF 65%, RVSP 12mmHg  Mild concentric LV hypertrophy  PERC negative    Imp: chest pressure, syncope  Unclear etiology plan: ECG, labs, reassess      Critical Care Time  Procedures

## 2019-05-17 ENCOUNTER — APPOINTMENT (EMERGENCY)
Dept: RADIOLOGY | Facility: HOSPITAL | Age: 34
End: 2019-05-17
Payer: COMMERCIAL

## 2019-05-17 ENCOUNTER — HOSPITAL ENCOUNTER (EMERGENCY)
Facility: HOSPITAL | Age: 34
Discharge: HOME | End: 2019-05-17
Attending: EMERGENCY MEDICINE | Admitting: EMERGENCY MEDICINE
Payer: COMMERCIAL

## 2019-05-17 VITALS
OXYGEN SATURATION: 98 % | HEART RATE: 98 BPM | WEIGHT: 190.04 LBS | TEMPERATURE: 99.6 F | BODY MASS INDEX: 34.76 KG/M2 | DIASTOLIC BLOOD PRESSURE: 74 MMHG | SYSTOLIC BLOOD PRESSURE: 124 MMHG | RESPIRATION RATE: 14 BRPM

## 2019-05-17 DIAGNOSIS — S60.019A THUMB CONTUSION: ICD-10-CM

## 2019-05-17 DIAGNOSIS — S63.602A LEFT THUMB SPRAIN: Primary | ICD-10-CM

## 2019-05-17 PROCEDURE — 73130 X-RAY EXAM OF HAND: CPT

## 2019-05-17 PROCEDURE — 99283 EMERGENCY DEPT VISIT LOW MDM: CPT

## 2019-05-17 PROCEDURE — 99283 EMERGENCY DEPT VISIT LOW MDM: CPT | Performed by: PHYSICIAN ASSISTANT

## 2019-05-17 RX ORDER — NAPROXEN 500 MG/1
500 TABLET ORAL 2 TIMES DAILY WITH MEALS
Qty: 30 TABLET | Refills: 0 | Status: SHIPPED | OUTPATIENT
Start: 2019-05-17 | End: 2019-12-31 | Stop reason: CLARIF

## 2019-12-30 ENCOUNTER — PROCEDURE VISIT (OUTPATIENT)
Dept: OBGYN CLINIC | Facility: CLINIC | Age: 34
End: 2019-12-30

## 2019-12-30 VITALS
HEART RATE: 78 BPM | BODY MASS INDEX: 33.84 KG/M2 | DIASTOLIC BLOOD PRESSURE: 74 MMHG | WEIGHT: 185 LBS | SYSTOLIC BLOOD PRESSURE: 124 MMHG

## 2019-12-30 DIAGNOSIS — R87.610 ASCUS WITH POSITIVE HIGH RISK HPV CERVICAL: Primary | ICD-10-CM

## 2019-12-30 DIAGNOSIS — R87.810 ASCUS WITH POSITIVE HIGH RISK HPV CERVICAL: Primary | ICD-10-CM

## 2019-12-30 LAB — SL AMB POCT URINE HCG: NEGATIVE

## 2019-12-30 PROCEDURE — 88305 TISSUE EXAM BY PATHOLOGIST: CPT | Performed by: PATHOLOGY

## 2019-12-30 PROCEDURE — 57456 ENDOCERV CURETTAGE W/SCOPE: CPT | Performed by: OBSTETRICS & GYNECOLOGY

## 2019-12-30 PROCEDURE — 81025 URINE PREGNANCY TEST: CPT | Performed by: OBSTETRICS & GYNECOLOGY

## 2019-12-30 NOTE — PROGRESS NOTES
Colposcopy  Date/Time: 12/30/2019 2:59 PM  Performed by: Denisse Peters MD  Authorized by: Denisse Peters MD     Consent:     Consent obtained:  Verbal and written    Consent given by:  Patient    Procedural risks discussed:  Bleeding and infection    Patient questions answered: yes      Patient agrees, verbalizes understanding, and wants to proceed: yes    Pre-procedure:     Pre-procedure timeout performed: yes      Prepped with: acetic acid and Lugol    Indication:     Indication:  ASC-US  Procedure:     Procedure: Colposcopy w/ endocervical curettage      Under satisfactory analgesia the patient was prepped and draped in the dorsal lithotomy position: yes      [place] speculum was placed in the vagina: yes      Under colposcopic examination the transition zone was seen in entirety: yes      Endocervix was curetted using a Kevorkian curette: yes      Tampon inserted: no      Monsel's solution was applied: no      Specimen to pathology: yes    Post-procedure:     Findings comment:  None    Impression comment:  Normal    Patient tolerance of procedure:   Tolerated well, no immediate complications

## 2019-12-31 ENCOUNTER — TELEPHONE (OUTPATIENT)
Dept: HEMATOLOGY ONCOLOGY | Facility: CLINIC | Age: 34
End: 2019-12-31

## 2019-12-31 ENCOUNTER — OFFICE VISIT (OUTPATIENT)
Dept: CARDIOLOGY CLINIC | Facility: CLINIC | Age: 34
End: 2019-12-31
Payer: COMMERCIAL

## 2019-12-31 VITALS
WEIGHT: 186 LBS | DIASTOLIC BLOOD PRESSURE: 60 MMHG | HEIGHT: 62 IN | SYSTOLIC BLOOD PRESSURE: 112 MMHG | BODY MASS INDEX: 34.23 KG/M2 | HEART RATE: 71 BPM

## 2019-12-31 DIAGNOSIS — R94.31 ABNORMAL EKG: ICD-10-CM

## 2019-12-31 DIAGNOSIS — R07.2 PRECORDIAL PAIN: Primary | ICD-10-CM

## 2019-12-31 DIAGNOSIS — F17.200 CURRENT EVERY DAY SMOKER: ICD-10-CM

## 2019-12-31 DIAGNOSIS — D64.89 ANEMIA DUE TO OTHER CAUSE, NOT CLASSIFIED: ICD-10-CM

## 2019-12-31 PROBLEM — D64.9 ANEMIA: Status: ACTIVE | Noted: 2019-12-31

## 2019-12-31 PROCEDURE — 99243 OFF/OP CNSLTJ NEW/EST LOW 30: CPT | Performed by: INTERNAL MEDICINE

## 2019-12-31 PROCEDURE — 93000 ELECTROCARDIOGRAM COMPLETE: CPT | Performed by: INTERNAL MEDICINE

## 2019-12-31 RX ORDER — ACETAMINOPHEN 325 MG/1
325 TABLET ORAL AS NEEDED
COMMUNITY
End: 2021-05-17

## 2019-12-31 NOTE — TELEPHONE ENCOUNTER
New Patient Encounter    New Patient Intake Form   Patient Details:  Austin Pantoja  1985  [id number]    Background Information:  [address] starts by opening a telephone encounter and gathering the following information   Who is calling to schedule? If not self, relationship to patient? Self   Referring Provider Dr Brenda Ewing   What is the diagnosis? Anemia due to other cause, not classified   Is there any prior history of Cancer? No   If yes, please explain N/A   When was the diagnosis? 2001   Is patient aware of diagnosis? Yes   Reason for visit? NP DX   Have you had any testing done? If so: when, where? Yes   Are records in [facility]? yes   Was the patient told to bring a disk? no   Scheduling Information:   Preferred [place]:  [place]     Requesting Specific Provider? N/A   Are there any dates/time the patient cannot be seen? N/A      Miscellaneous: N/A   After completing the above information, please route to Financial Counselor and the appropriate Nurse Navigator for review

## 2019-12-31 NOTE — PROGRESS NOTES
Cardiology Follow Up    [address]  1985  [address] [place] [address] [place]  [facility] [address]  [phone]    1  Precordial pain  POCT ECG    Stress test only, exercise    Echo complete with contrast if indicated   2  Abnormal EKG  Stress test only, exercise    Echo complete with contrast if indicated   3  Anemia due to other cause, not classified  Ambulatory referral to Hematology / Oncology   4  Current every day smoker         Interval History:   Cardiology consultation  Pleasant but very anxious 40-year-old female clinical relation of chest pain syndrome  The patient starts my interview by stating I think I am going to die she has been suffering from chest pain syndrome, for approximately 2 years  Almost on a daily basis  She acknowledges that there is a component of anxiety present  She was hospitalized about a week last year, she can recall  the dates, at [facility] will be procure  She states that she was told there left side of the heart was enlarged and echo given explanation as to why  Her symptoms are described as a sharp discomfort in the left breast area associated with tingling and nipple and tingling and numbness in the left arm  The discomfort very brief but the numbness last for several minutes  There is no associated dyspnea  She has had several emergency room visits because of that, most recently 4 months ago  At that time cardiac evaluation was unrevealing  She was found to be anemic, hemoglobin 9 7 with microcytic index is  She tells me she has been anemic for a long time, no formal evaluation  The patient has sedentary lifestyle, she admits to smoke having started at age 13  She acknowledges as stated above the some symptoms might be related to anxiety  She has been using anti anxiety medications in the past on and off    Patient is    No cardiac issues during the pregnancy  Family history he tells me that her cousin  at age 23 of some cardiac death possible cardiomyopathy, and aunt  of cardiac disease unclear etiology     An exercise stress test she did 9 minutes on a Ciro protocol  EKG was negative for ischemia  She did complain of chest discomfort with exercise  Old records available  Admitted with chest pain syndrome on , there were no significant EKG changes  No arrhythmias noted on telemetry during her hospitalization  An exercise stress test, she did 9 minutes of Ciro protocol, there were no EKG changes suggestive ischemia, she did complain of chest discomfort  With exercise  An echocardiogram revealed normal left systolic function, there is description of left ventricular hypertrophy and normal diastolic function  Septum was 12 mm  Right ventricle is normal, estimated pulmonary pressures were normal  There were no valvular abnormalities  Multiple records were reviewed      Patient Active Problem List   Diagnosis    Marijuana abuse    Anxiety    Chest pain    Vaginal discharge    Bacterial vaginosis    Abnormal EKG    Anemia    Current every day smoker     Past Medical History:   Diagnosis Date    Anxiety     Heart problem     swelling around her heart     Social History     Socioeconomic History    Marital status: Single     Spouse name: Not on file    Number of children: Not on file    Years of education: Not on file    Highest education level: Not on file   Occupational History    Not on file   Social Needs    Financial resource strain: Not on file    Food insecurity:     Worry: Not on file     Inability: Not on file    Transportation needs:     Medical: Not on file     Non-medical: Not on file   Tobacco Use    Smoking status: Current Every Day Smoker     Types: Cigars    Smokeless tobacco: Never Used   Substance and Sexual Activity    Alcohol use: Yes     Comment: socially    Drug use: Yes     Types: Marijuana     Comment: occ   Sexual activity: Yes     Birth control/protection: Female Sterilization   Lifestyle    Physical activity:     Days per week: Not on file     Minutes per session: Not on file    Stress: Not on file   Relationships    Social connections:     Talks on phone: Not on file     Gets together: Not on file     Attends Baptism service: Not on file     Active member of club or organization: Not on file     Attends meetings of clubs or organizations: Not on file     Relationship status: Not on file    Intimate partner violence:     Fear of current or ex partner: Not on file     Emotionally abused: Not on file     Physically abused: Not on file     Forced sexual activity: Not on file   Other Topics Concern    Not on file   Social History Narrative    Not on file      Family History   Problem Relation Age of Onset    Hypertension Mother     Hypertension Father      Past Surgical History:   Procedure Laterality Date    CERVICAL BIOPSY       SECTION         Current Outpatient Medications:     acetaminophen (TYLENOL) 325 mg tablet, Take 325 mg by mouth as needed for mild pain, Disp: , Rfl:     sertraline (ZOLOFT) 25 mg tablet, Take 1 tablet (25 mg total) by mouth daily (Patient taking differently: Take 25 mg by mouth as needed ), Disp: 30 tablet, Rfl: 1  No Known Allergies    Labs:  Procedure visit on 2019   Component Date Value    URINE HCG 2019 negative      Imaging: No results found  Review of Systems:  Review of Systems   Constitutional: Positive for fatigue  Negative for activity change and appetite change  HENT: Negative for hearing loss and nosebleeds  Eyes: Negative for visual disturbance  Respiratory: Positive for chest tightness and shortness of breath  Negative for apnea, cough, choking, wheezing and stridor  Cardiovascular: Positive for chest pain and palpitations  Negative for leg swelling     Gastrointestinal: Negative for abdominal pain  Endocrine: Negative for cold intolerance and heat intolerance  Genitourinary: Negative for hematuria  Musculoskeletal: Negative for arthralgias and myalgias  Allergic/Immunologic: Negative for immunocompromised state  Neurological: Positive for numbness  Negative for dizziness, syncope, speech difficulty, weakness and light-headedness  Hematological: Does not bruise/bleed easily  Psychiatric/Behavioral: Positive for decreased concentration and sleep disturbance  The patient is nervous/anxious  Physical Exam:  Physical Exam   Constitutional: She is oriented to person, place, and time  She appears well-developed  No distress  Eyes: Conjunctivae are normal  No scleral icterus  Neck: No JVD present  No tracheal deviation present  No thyromegaly present  Cardiovascular: Normal rate, regular rhythm, normal heart sounds and intact distal pulses  Exam reveals no friction rub  No murmur heard  Pulmonary/Chest: Effort normal and breath sounds normal  No stridor  No respiratory distress  She has no wheezes  She has no rales  Abdominal: Soft  Bowel sounds are normal  She exhibits no distension and no mass  There is no tenderness  There is no rebound and no guarding  Musculoskeletal: She exhibits no edema  Neurological: She is alert and oriented to person, place, and time  Skin: Skin is warm  Capillary refill takes less than 2 seconds  No rash noted  She is not diaphoretic  No erythema  No pallor  Psychiatric: Her behavior is normal  Thought content normal  Her mood appears anxious  Vitals reviewed  Discussion/Summary:  Chest pain syndrome, very atypical   Questionable cardiomyopathy  All records will be reviewed  Her EKG today is normal   An EKG at the emergency room was borderline abnormal with first-degree AV block borderline and very minimal nonspecific ST segment changes  Chest x-ray was normal  Clearly there is a component of anxiety present here    Will do a stress test and echocardiogram   Consideration for cardiac MRI  Although she does not think she can go through 1   Reassured was given to the patient, follow-up if any abnormalities on the testing  The long-term benefits of smoking cessation were explained to the patient  Including but not limited to decreased mortality and morbidity from cardiovascular causes, respiratory diseases and decrease cancer risk  Strong recommendation for cessation was given to the patient  Pharmacological help was offered as well emotional support was given to the patient  Spent 5 minutes    This note was completed in part utilizing [employer] direct voice recognition software  Grammatical errors, random word insertion, spelling mistakes, and incomplete sentences may be an occasional consequence of the system secondary to software limitations, ambient noise and hardware issues  At the time of dictation, efforts were made to edit, clarify and /or correct errors  Please read the chart carefully and recognize, using context, where substitutions have occurred  If you have any questions or concerns about the context, text or information contained within the body of this dictation, please contact myself, the provider, for further clarification

## 2020-01-07 ENCOUNTER — TELEPHONE (OUTPATIENT)
Dept: OBGYN CLINIC | Facility: CLINIC | Age: 35
End: 2020-01-07

## 2020-01-21 ENCOUNTER — HOSPITAL ENCOUNTER (OUTPATIENT)
Dept: NON INVASIVE DIAGNOSTICS | Facility: CLINIC | Age: 35
Discharge: HOME | End: 2020-01-21
Payer: COMMERCIAL

## 2020-01-21 ENCOUNTER — TELEPHONE (OUTPATIENT)
Dept: CARDIOLOGY CLINIC | Facility: CLINIC | Age: 35
End: 2020-01-21

## 2020-01-21 DIAGNOSIS — R94.31 ABNORMAL EKG: ICD-10-CM

## 2020-01-21 DIAGNOSIS — R94.39 ABNORMAL STRESS TEST: ICD-10-CM

## 2020-01-21 DIAGNOSIS — R07.2 PRECORDIAL PAIN: ICD-10-CM

## 2020-01-21 DIAGNOSIS — R94.39 ABNORMAL STRESS ECG: ICD-10-CM

## 2020-01-21 DIAGNOSIS — R94.31 ABNORMAL EKG: Primary | ICD-10-CM

## 2020-01-21 DIAGNOSIS — R94.39 ABNORMAL STRESS ECG: Primary | ICD-10-CM

## 2020-01-21 PROCEDURE — 93306 TTE W/DOPPLER COMPLETE: CPT | Performed by: INTERNAL MEDICINE

## 2020-01-21 PROCEDURE — 93016 CV STRESS TEST SUPVJ ONLY: CPT | Performed by: INTERNAL MEDICINE

## 2020-01-21 PROCEDURE — 93018 CV STRESS TEST I&R ONLY: CPT | Performed by: INTERNAL MEDICINE

## 2020-01-21 PROCEDURE — 93017 CV STRESS TEST TRACING ONLY: CPT

## 2020-01-21 PROCEDURE — 93306 TTE W/DOPPLER COMPLETE: CPT

## 2020-01-21 NOTE — TELEPHONE ENCOUNTER
----- Message from Autumn Pugh MD sent at 1/21/2020 11:51 AM EST -----  Please call the patient regarding her abnormal result    Please order cardiac CTA, order a BMP metoprolol tartrate 25 mg before procedure

## 2020-01-23 LAB
CHEST PAIN STATEMENT: NORMAL
MAX DIASTOLIC BP: 50 MMHG
MAX HEART RATE: 151 BPM
MAX PREDICTED HEART RATE: 184 BPM
MAX. SYSTOLIC BP: 158 MMHG
PROTOCOL NAME: NORMAL
TARGET HR FORMULA: NORMAL
TEST INDICATION: NORMAL
TIME IN EXERCISE PHASE: NORMAL

## 2020-02-03 ENCOUNTER — TELEPHONE (OUTPATIENT)
Dept: HEMATOLOGY ONCOLOGY | Facility: CLINIC | Age: 35
End: 2020-02-03

## 2020-02-03 NOTE — TELEPHONE ENCOUNTER
Called pt, and call did not ring  Phone went to automatic message that the call did not go through  Called # a 2nd time immediately, and received the same message  Could not leave a message

## 2020-02-04 ENCOUNTER — TELEPHONE (OUTPATIENT)
Dept: SURGICAL ONCOLOGY | Facility: CLINIC | Age: 35
End: 2020-02-04

## 2020-02-11 NOTE — NURSING NOTE
Cardiac CTA Questionairre     Cardiac history    Reason for exam:  Abnormal stress test, chest pain,     Have you been diagnosed with heart disease? No    Do you have a family history of heart disease? Yes    Have you ever experienced chest pain? Yes    Have you had a CABG, angioplasty, cardiac cath, stent placement? No    Do you have a pacemaker or defibrillator? No    Have you ever been diagnosed with an irregular heart beat? Yes, hx 1st degree AV block, ST changes    Do you have a history of having COPD or asthma? No    Do you have high blood pressure? No    Do you have diabetes? No    Do you have high cholesterol? No    Do you smoke? Yes    General Information    Do you have an allergy to intravenous contrast or dye? No, NKA    Do you have kidney disease? No    Height:        5'2                       Weight: 186    Questionnaire completed with patient via phone  Procedure/ medications explained, all questions answered  Pt does not take PDE5 inhibitors  Instructed to take 25 mg metoprolol tartrate 1 hr prior to test by Dr Dede Buchanan  Pt to  script today  Labs not required  Medication/allergy verified  Instructions given  Patient verbalizes understanding of education and instructions for testing

## 2020-02-13 ENCOUNTER — TELEPHONE (OUTPATIENT)
Dept: CARDIOLOGY CLINIC | Facility: CLINIC | Age: 35
End: 2020-02-13

## 2020-02-13 ENCOUNTER — HOSPITAL ENCOUNTER (OUTPATIENT)
Dept: CT IMAGING | Facility: HOSPITAL | Age: 35
Discharge: HOME | End: 2020-02-13
Attending: INTERNAL MEDICINE
Payer: COMMERCIAL

## 2020-02-13 VITALS — RESPIRATION RATE: 20 BRPM | DIASTOLIC BLOOD PRESSURE: 69 MMHG | HEART RATE: 64 BPM | SYSTOLIC BLOOD PRESSURE: 116 MMHG

## 2020-02-13 DIAGNOSIS — R94.39 ABNORMAL STRESS TEST: ICD-10-CM

## 2020-02-13 PROCEDURE — 75574 CT ANGIO HRT W/3D IMAGE: CPT

## 2020-02-13 RX ORDER — NITROGLYCERIN 0.4 MG/1
0.4 TABLET SUBLINGUAL ONCE
Status: COMPLETED | OUTPATIENT
Start: 2020-02-13 | End: 2020-02-13

## 2020-02-13 RX ADMIN — NITROGLYCERIN 0.4 MG: 0.4 TABLET SUBLINGUAL at 10:52

## 2020-02-13 RX ADMIN — IODIXANOL 100 ML: 320 INJECTION, SOLUTION INTRAVASCULAR at 10:59

## 2020-02-13 NOTE — TELEPHONE ENCOUNTER
----- Message from Lauren Serrato MD sent at 2/13/2020 12:23 PM EST -----  Please call the patient regarding her normal result

## 2020-02-13 NOTE — NURSING NOTE
Patient arrived for cardiac cta  Procedure/medications reviewed by patient  Patient states she took 2 tabs 25 mg metoprolol tartrate this am  Pt tolerated test well, see vitals flowsheet  Remained asymptomatic during and post procedure  Encouraged increased fluids remainder of day  Discharged home with significant other

## 2020-02-25 ENCOUNTER — DOCUMENTATION (OUTPATIENT)
Dept: HEMATOLOGY ONCOLOGY | Facility: CLINIC | Age: 35
End: 2020-02-25

## 2020-02-25 ENCOUNTER — TRANSCRIBE ORDERS (OUTPATIENT)
Dept: HEMATOLOGY ONCOLOGY | Facility: CLINIC | Age: 35
End: 2020-02-25

## 2020-02-25 DIAGNOSIS — D64.89 ANEMIA DUE TO OTHER CAUSE, NOT CLASSIFIED: Primary | ICD-10-CM

## 2020-02-25 NOTE — PROGRESS NOTES
Left message asking that patient call me to discuss appointment for tomorrow  She is scheduled to see Dania Rubio for anemia but the labs that are in the system are 10 mo old  If she has not had labs done recently she can have them done today at a [address] [place] [address] facility if her insurance allows her to use our lab  If not appointment will need to be rescheduled until after she had labs done

## 2020-03-02 ENCOUNTER — HOSPITAL ENCOUNTER (EMERGENCY)
Facility: HOSPITAL | Age: 35
Discharge: HOME | End: 2020-03-02
Attending: EMERGENCY MEDICINE
Payer: COMMERCIAL

## 2020-03-02 DIAGNOSIS — E61.1 IRON DEFICIENCY: ICD-10-CM

## 2020-03-02 DIAGNOSIS — F41.9 ANXIETY: Primary | ICD-10-CM

## 2020-03-02 LAB
ANION GAP SERPL CALCULATED.3IONS-SCNC: 5 MMOL/L (ref 4–13)
BASOPHILS # BLD AUTO: 0.04 THOUSANDS/ΜL (ref 0–0.1)
BASOPHILS NFR BLD AUTO: 1 % (ref 0–1)
BUN SERPL-MCNC: 12 MG/DL (ref 5–25)
CALCIUM SERPL-MCNC: 8.6 MG/DL (ref 8.3–10.1)
CHLORIDE SERPL-SCNC: 109 MMOL/L (ref 100–108)
CO2 SERPL-SCNC: 27 MMOL/L (ref 21–32)
CREAT SERPL-MCNC: 0.8 MG/DL (ref 0.6–1.3)
EOSINOPHIL # BLD AUTO: 0.15 THOUSAND/ΜL (ref 0–0.61)
EOSINOPHIL NFR BLD AUTO: 2 % (ref 0–6)
ERYTHROCYTE [DISTWIDTH] IN BLOOD BY AUTOMATED COUNT: 17.5 % (ref 11.6–15.1)
FERRITIN SERPL-MCNC: 2 NG/ML (ref 8–388)
GFR SERPL CREATININE-BSD FRML MDRD: 110 ML/MIN/1.73SQ M
GLUCOSE SERPL-MCNC: 93 MG/DL (ref 65–140)
HCT VFR BLD AUTO: 29.9 % (ref 34.8–46.1)
HGB BLD-MCNC: 8.7 G/DL (ref 11.5–15.4)
IMM GRANULOCYTES # BLD AUTO: 0 THOUSAND/UL (ref 0–0.2)
IMM GRANULOCYTES NFR BLD AUTO: 0 % (ref 0–2)
IRON SATN MFR SERPL: 3 %
IRON SERPL-MCNC: 11 UG/DL (ref 50–170)
LYMPHOCYTES # BLD AUTO: 2.29 THOUSANDS/ΜL (ref 0.6–4.47)
LYMPHOCYTES NFR BLD AUTO: 36 % (ref 14–44)
MCH RBC QN AUTO: 22.3 PG (ref 26.8–34.3)
MCHC RBC AUTO-ENTMCNC: 29.1 G/DL (ref 31.4–37.4)
MCV RBC AUTO: 77 FL (ref 82–98)
MONOCYTES # BLD AUTO: 0.55 THOUSAND/ΜL (ref 0.17–1.22)
MONOCYTES NFR BLD AUTO: 9 % (ref 4–12)
NEUTROPHILS # BLD AUTO: 3.37 THOUSANDS/ΜL (ref 1.85–7.62)
NEUTS SEG NFR BLD AUTO: 52 % (ref 43–75)
NRBC BLD AUTO-RTO: 0 /100 WBCS
PLATELET # BLD AUTO: 261 THOUSANDS/UL (ref 149–390)
PMV BLD AUTO: 9.7 FL (ref 8.9–12.7)
POTASSIUM SERPL-SCNC: 3.6 MMOL/L (ref 3.5–5.3)
RBC # BLD AUTO: 3.9 MILLION/UL (ref 3.81–5.12)
SODIUM SERPL-SCNC: 141 MMOL/L (ref 136–145)
TIBC SERPL-MCNC: 430 UG/DL (ref 250–450)
WBC # BLD AUTO: 6.4 THOUSAND/UL (ref 4.31–10.16)

## 2020-03-02 PROCEDURE — 94640 AIRWAY INHALATION TREATMENT: CPT

## 2020-03-02 PROCEDURE — 99284 EMERGENCY DEPT VISIT MOD MDM: CPT | Performed by: EMERGENCY MEDICINE

## 2020-03-02 PROCEDURE — 85025 COMPLETE CBC W/AUTO DIFF WBC: CPT | Performed by: EMERGENCY MEDICINE

## 2020-03-02 PROCEDURE — 83550 IRON BINDING TEST: CPT | Performed by: EMERGENCY MEDICINE

## 2020-03-02 PROCEDURE — 83540 ASSAY OF IRON: CPT | Performed by: EMERGENCY MEDICINE

## 2020-03-02 PROCEDURE — 94640 AIRWAY INHALATION TREATMENT: CPT | Performed by: EMERGENCY MEDICINE

## 2020-03-02 PROCEDURE — 80048 BASIC METABOLIC PNL TOTAL CA: CPT | Performed by: EMERGENCY MEDICINE

## 2020-03-02 PROCEDURE — 36415 COLL VENOUS BLD VENIPUNCTURE: CPT | Performed by: EMERGENCY MEDICINE

## 2020-03-02 PROCEDURE — 82728 ASSAY OF FERRITIN: CPT | Performed by: EMERGENCY MEDICINE

## 2020-03-02 PROCEDURE — 99283 EMERGENCY DEPT VISIT LOW MDM: CPT

## 2020-03-02 RX ORDER — FERROUS SULFATE 325(65) MG
325 TABLET ORAL DAILY
Qty: 30 TABLET | Refills: 0 | Status: SHIPPED | OUTPATIENT
Start: 2020-03-02 | End: 2020-08-02

## 2020-03-02 RX ORDER — ALBUTEROL SULFATE 2.5 MG/3ML
2.5 SOLUTION RESPIRATORY (INHALATION) ONCE
Status: COMPLETED | OUTPATIENT
Start: 2020-03-02 | End: 2020-03-02

## 2020-03-02 RX ADMIN — ALBUTEROL SULFATE 2.5 MG: 2.5 SOLUTION RESPIRATORY (INHALATION) at 02:17

## 2020-03-02 NOTE — ED PROVIDER NOTES
History  Chief Complaint   Patient presents with    Anxiety     pt reports having "anxiety so bad i feel like i can not breathe"  Hx of anxiety     41-year-old female with no pertinent past medical history presenting to the ED stating that she got off work around 2300 and was watching Netflix in bed approximately 30 minutes prior to arrival she had some shortness of breath  She states like it felt like her airways were closing off and started to make weird noises when she was breathing  She denies any chest pain, painful breathing, recent illness, fever, night sweats, chills, sore throat, cough, abdominal pain, nausea/vomiting, diarrhea/constipation, dysuria, hematuria, calf pain, swelling, redness, history of blood clots in her family, recent surgery, cancer diagnosis, chemotherapy, hormonal therapy, recent long plane ride/car ride/bus rides  Patient states that nothing seems to make the feeling worse however she states the feeling seem to resolve itself and she was given oxygen via nasal cannula by EMS  Patient was recently evaluated for cardiac disease and had a CTA of her coronary arteries done in February 13th of this year which showed a calcium score of 0 and normal coronary artery anatomy, an echocardiogram was performed in January of this year which showed normal LV function and no regional wall abnormalities as well as normal size of the right ventricle  Prior to Admission Medications   Prescriptions Last Dose Informant Patient Reported?  Taking?   acetaminophen (TYLENOL) 325 mg tablet  Self Yes Yes   Sig: Take 325 mg by mouth as needed for mild pain   metoprolol tartrate (LOPRESSOR) 25 mg tablet   No Yes   Sig: Take 1 tablet (25 mg total) by mouth see administration instructions Take 1 tablet( 25 mg total )1 hour prior to procedure   sertraline (ZOLOFT) 25 mg tablet  Self No Yes   Sig: Take 1 tablet (25 mg total) by mouth daily   Patient taking differently: Take 25 mg by mouth as needed Facility-Administered Medications: None       Past Medical History:   Diagnosis Date    Anxiety     Heart problem     swelling around her heart       Past Surgical History:   Procedure Laterality Date    CERVICAL BIOPSY       SECTION         Family History   Problem Relation Age of Onset    Hypertension Mother     Hypertension Father      I have reviewed and agree with the history as documented  E-Cigarette/Vaping     E-Cigarette/Vaping Substances     Social History     Tobacco Use    Smoking status: Current Every Day Smoker     Types: Cigars    Smokeless tobacco: Never Used   Substance Use Topics    Alcohol use: Yes     Comment: socially    Drug use: Yes     Types: Marijuana     Comment: occ  Review of Systems   Respiratory: Positive for chest tightness and shortness of breath  Negative for apnea, cough, choking, wheezing and stridor  Cardiovascular: Negative for chest pain, palpitations and leg swelling  All other systems reviewed and are negative  Physical Exam  ED Triage Vitals   Temp Pulse Resp BP SpO2   -- -- -- -- --      Temp src Heart Rate Source Patient Position - Orthostatic VS BP Location FiO2 (%)   -- -- -- -- --      Pain Score       --             Orthostatic Vital Signs  There were no vitals filed for this visit  Physical Exam   Constitutional: She is oriented to person, place, and time  She appears well-developed and well-nourished  No distress  HENT:   Head: Normocephalic and atraumatic  Right Ear: External ear normal    Left Ear: External ear normal    Nose: Nose normal    Mouth/Throat: Uvula is midline, oropharynx is clear and moist and mucous membranes are normal  No oropharyngeal exudate, posterior oropharyngeal edema, posterior oropharyngeal erythema or tonsillar abscesses  Tonsils are 0 on the right  Tonsils are 0 on the left  No tonsillar exudate     No obvious swelling of the patient's throat noted   Eyes: Pupils are equal, round, and reactive to light  Conjunctivae and EOM are normal  Right eye exhibits no discharge  Left eye exhibits no discharge  No scleral icterus  Neck: Trachea normal, normal range of motion and full passive range of motion without pain  Neck supple  No JVD present  No tracheal tenderness, no spinous process tenderness and no muscular tenderness present  No neck rigidity  No tracheal deviation, no edema, no erythema and normal range of motion present  No swelling or tenderness the patient's neck, no obvious adenopathy noted  Cardiovascular: Normal rate, regular rhythm, normal heart sounds and intact distal pulses  Exam reveals no gallop and no friction rub  No murmur heard  Pulmonary/Chest: Effort normal and breath sounds normal  No stridor  No respiratory distress  She has no wheezes  She has no rales  Abdominal: Soft  Bowel sounds are normal  She exhibits no distension and no mass  There is no tenderness  There is no guarding  Musculoskeletal: Normal range of motion  She exhibits no edema, tenderness or deformity  Neurological: She is alert and oriented to person, place, and time  No cranial nerve deficit or sensory deficit  She exhibits normal muscle tone  Skin: Skin is warm and dry  No rash noted  She is not diaphoretic  No erythema  No pallor  Psychiatric: She has a normal mood and affect  Her behavior is normal  Judgment and thought content normal    Nursing note and vitals reviewed        ED Medications  Medications   albuterol inhalation solution 2 5 mg (2 5 mg Nebulization Given 3/2/20 0217)       Diagnostic Studies  Results Reviewed     Procedure Component Value Units Date/Time    Basic metabolic panel [[id number]]  (Abnormal) Collected:  03/02/20 0219    Lab Status:  Final result Specimen:  Blood from Arm, Right Updated:  03/02/20 0245     Sodium 141 mmol/L      Potassium 3 6 mmol/L      Chloride 109 mmol/L      CO2 27 mmol/L      ANION GAP 5 mmol/L      BUN 12 mg/dL      Creatinine 0 80 mg/dL      Glucose 93 mg/dL      Calcium 8 6 mg/dL      eGFR 110 ml/min/1 73sq m     Narrative:       [place] guidelines for Chronic Kidney Disease (CKD):     Stage 1 with normal or high GFR (GFR > 90 mL/min/1 73 square meters)    Stage 2 Mild CKD (GFR = 60-89 mL/min/1 73 square meters)    Stage 3A Moderate CKD (GFR = 45-59 mL/min/1 73 square meters)    Stage 3B Moderate CKD (GFR = 30-44 mL/min/1 73 square meters)    Stage 4 Severe CKD (GFR = 15-29 mL/min/1 73 square meters)    Stage 5 End Stage CKD (GFR <15 mL/min/1 73 square meters)  Note: GFR calculation is accurate only with a steady state creatinine    Iron Saturation % [[id number]]  (Abnormal) Collected:  03/02/20 0219    Lab Status:  Final result Specimen:  Blood from Arm, Right Updated:  03/02/20 0245     Iron Saturation 3 %      TIBC 430 ug/dL      Iron 11 ug/dL     Ferritin [[id number]]  (Abnormal) Collected:  03/02/20 0219    Lab Status:  Final result Specimen:  Blood from Arm, Right Updated:  03/02/20 0245     Ferritin 2 ng/mL     CBC and differential [[id number]]  (Abnormal) Collected:  03/02/20 0219    Lab Status:  Final result Specimen:  Blood from Arm, Right Updated:  03/02/20 0226     WBC 6 40 Thousand/uL      RBC 3 90 Million/uL      Hemoglobin 8 7 g/dL      Hematocrit 29 9 %      MCV 77 fL      MCH 22 3 pg      MCHC 29 1 g/dL      RDW 17 5 %      MPV 9 7 fL      Platelets 317 Thousands/uL      nRBC 0 /100 WBCs      Neutrophils Relative 52 %      Immat GRANS % 0 %      Lymphocytes Relative 36 %      Monocytes Relative 9 %      Eosinophils Relative 2 %      Basophils Relative 1 %      Neutrophils Absolute 3 37 Thousands/µL      Immature Grans Absolute 0 00 Thousand/uL      Lymphocytes Absolute 2 29 Thousands/µL      Monocytes Absolute 0 55 Thousand/µL      Eosinophils Absolute 0 15 Thousand/µL      Basophils Absolute 0 04 Thousands/µL                  No orders to display         Procedures  Procedures      ED Course               PERC Rule for PE      Most Recent Value   PERC Rule for PE   Age >=50  0 Filed at: 03/02/2020 0213   HR >=100  0 Filed at: 03/02/2020 0213   O2 Sat on room air < 95%  0 Filed at: 03/02/2020 6371   History of PE or DVT  0 Filed at: 03/02/2020 9717   Recent trauma or surgery  0 Filed at: 03/02/2020 0223   Hemoptysis  0 Filed at: 03/02/2020 6765   Exogenous estrogen  0 Filed at: 03/02/2020 0295   Unilateral leg swelling  0 Filed at: 03/02/2020 0203   PERC Rule for PE Results  0 Filed at: 03/02/2020 0213                      MDM      Disposition  Final diagnoses:   Anxiety   Iron deficiency     Time reflects when diagnosis was documented in both MDM as applicable and the Disposition within this note     Time User Action Codes Description Comment    3/2/2020  2:46 AM Reed Irons Add [F41 9] Anxiety     3/2/2020  2:46 AM Reed Irons Add [E61 1] Iron deficiency       ED Disposition     ED Disposition Condition Date/Time Comment    Discharge Stable Mon Mar 2, 2020  2:46 AM Ivania Lombardi discharge to home/self care              Follow-up Information     Follow up With Specialties Details Why Contact Info Additional [address] [facility] Schedule an appointment as soon as possible for a visit   [address] [place] [phone]  [address] [place] [address], [phone]   [facility] Emergency Department Emergency Medicine  As needed, If symptoms worsen [address] [place] [address]  [phone]  ED, [address] [place] [address], [address] [phone]          Patient's Medications   Discharge Prescriptions    FERROUS SULFATE 325 (65 FE) MG TABLET    Take 1 tablet (325 mg total) by mouth daily       Start Date: 3/2/2020  End Date: 4/1/2020       Order Dose: 325 mg       Quantity: 30 tablet    Refills: 0     No discharge procedures on file  PDMP Review     None           ED Provider  Attending physically available and evaluated Boyd Calvo  KAELYN managed the patient along with the ED Attending      Electronically Signed by         Rich Ayoub DO  03/02/20 9385

## 2020-03-02 NOTE — ED ATTENDING ATTESTATION
3/2/2020  IDavid MD, saw and evaluated the patient  I have discussed the patient with the resident/non-physician practitioner and agree with the resident's/non-physician practitioner's findings, Plan of Care, and MDM as documented in the resident's/non-physician practitioner's note, except where noted  All available labs and Radiology studies were reviewed  I was present for key portions of any procedure(s) performed by the resident/non-physician practitioner and I was immediately available to provide assistance  At this point I agree with the current assessment done in the Emergency Department  I have conducted an independent evaluation of this patient a history and physical is as follows:    ED Course      Emergency Department Note- Marcel Ayers 37 y o  female MRN: [id number]    Unit/Bed#: ED 28 Encounter: [id number]    Marcel Ayers is a 37 y o  female who presents with   Chief Complaint   Patient presents with    Anxiety     pt reports having "anxiety so bad i feel like i can not breathe"  Hx of anxiety         History of Present Illness   HPI:  Marcel Ayers is a 37 y o  female who presents for evaluation of:  Anxiety episode that started abruptly while she was at home resting  The patient states that she felt that she could not breath  Patient has no h/o lung disease  Review of Systems   Constitutional: Negative for fatigue and fever  Cardiovascular: Negative for chest pain and palpitations  Psychiatric/Behavioral: Negative for dysphoric mood and suicidal ideas  The patient is nervous/anxious  All other systems reviewed and are negative        Historical Information   Past Medical History:   Diagnosis Date    Anxiety     Heart problem     swelling around her heart     Past Surgical History:   Procedure Laterality Date    CERVICAL BIOPSY       SECTION       Social History   Social History     Substance and Sexual Activity   Alcohol Use Yes    Comment: socially     Social History     Substance and Sexual Activity   Drug Use Yes    Types: Marijuana    Comment: occ  Social History     Tobacco Use   Smoking Status Current Every Day Smoker    Types: Cigars   Smokeless Tobacco Never Used     Family History: non-contributory    Meds/Allergies   all medications and allergies reviewed  No Known Allergies    Objective   First Vitals:        Current Vitals:        No intake or output data in the 24 hours ending 20 0210    Invasive Devices     None                 Physical Exam   Constitutional: She is oriented to person, place, and time  She appears well-developed and well-nourished  HENT:   Head: Normocephalic and atraumatic  Neck: Normal range of motion  Neck supple  Musculoskeletal: Normal range of motion  Neurological: She is alert and oriented to person, place, and time  Skin: Skin is warm and dry  Capillary refill takes less than 2 seconds  Psychiatric: She has a normal mood and affect  Her behavior is normal  Judgment and thought content normal    Nursing note and vitals reviewed  36 yo female in no distress    Medical Decision Makin  Anxiety episode: resolved in the ED    No results found for this or any previous visit (from the past 36 hour(s))  No orders to display         Portions of the record may have been created with voice recognition software  Occasional wrong word or "sound a like" substitutions may have occurred due to the inherent limitations of voice recognition software  Read the chart carefully and recognize, using context, where substitutions have occurred            Critical Care Time  Procedures

## 2020-03-02 NOTE — DISCHARGE INSTRUCTIONS
Please make sure to take your iron pills with a full meal, and it may turn your stool black  Please follow up with your Primary Care Doctor for continued monitoring  If your symptoms return or become worse get re-evaluated in an Emergency Department

## 2020-07-06 ENCOUNTER — TELEPHONE (OUTPATIENT)
Dept: OBGYN CLINIC | Facility: CLINIC | Age: 35
End: 2020-07-06

## 2020-07-06 NOTE — TELEPHONE ENCOUNTER
1  Do you presently have a fever or flu-like symptoms{ no  2  NOo you have symptoms of an upper respiratory infection like runny nose, sore throat, or cough? NO  3  Are you suffering from new headache that you have not had in the past? NO  4  Do you have/have you experienced any new shortness of breath recently? NO  5  Do you have any new diarrhea, nausea or vomiting? NO  6  Have you been in contact with anyone who has been sick or diagnosed with COVID-19? NO

## 2020-07-07 ENCOUNTER — OFFICE VISIT (OUTPATIENT)
Dept: OBGYN CLINIC | Facility: CLINIC | Age: 35
End: 2020-07-07

## 2020-07-07 VITALS
HEART RATE: 76 BPM | TEMPERATURE: 98.8 F | DIASTOLIC BLOOD PRESSURE: 72 MMHG | WEIGHT: 172 LBS | SYSTOLIC BLOOD PRESSURE: 111 MMHG | BODY MASS INDEX: 31.65 KG/M2 | HEIGHT: 62 IN

## 2020-07-07 DIAGNOSIS — N76.0 VAGINAL INFECTION: ICD-10-CM

## 2020-07-07 DIAGNOSIS — B96.89 BACTERIAL VAGINOSIS: ICD-10-CM

## 2020-07-07 DIAGNOSIS — Z72.51 HIGH RISK HETEROSEXUAL BEHAVIOR: Primary | ICD-10-CM

## 2020-07-07 DIAGNOSIS — Z11.3 SCREEN FOR STD (SEXUALLY TRANSMITTED DISEASE): ICD-10-CM

## 2020-07-07 DIAGNOSIS — N76.0 BACTERIAL VAGINOSIS: ICD-10-CM

## 2020-07-07 PROCEDURE — 87591 N.GONORRHOEAE DNA AMP PROB: CPT | Performed by: OBSTETRICS & GYNECOLOGY

## 2020-07-07 PROCEDURE — 3008F BODY MASS INDEX DOCD: CPT | Performed by: OBSTETRICS & GYNECOLOGY

## 2020-07-07 PROCEDURE — 99213 OFFICE O/P EST LOW 20 MIN: CPT | Performed by: OBSTETRICS & GYNECOLOGY

## 2020-07-07 PROCEDURE — 87491 CHLMYD TRACH DNA AMP PROBE: CPT | Performed by: OBSTETRICS & GYNECOLOGY

## 2020-07-07 RX ORDER — METRONIDAZOLE 500 MG/1
500 TABLET ORAL EVERY 12 HOURS SCHEDULED
Qty: 14 TABLET | Refills: 0 | Status: SHIPPED | OUTPATIENT
Start: 2020-07-07 | End: 2020-07-14

## 2020-07-07 NOTE — PROGRESS NOTES
OB/GYN VISIT  Amber Ng  2020  2:39 PM    Subjective:     Amber Ng is a 37 y o   female who presents with acute onset vaginal discharge for past 5 days  She reports presence of grey/white vaginal discharge and associated vaginal irritation  Reports malodorous vaginal discharge  She has not used any new soaps, denies douche, denies new detergent or clothing  She is currently sexually active with male partner, s/p BTL for contraception  Feels safe at home  COVID19 Screen:    Cough: No  Fever: No  Shortness of breath: No  Known exposures to COVID-19, or international travel: No    Objective:    Vitals: Blood pressure 111/72, pulse 76, temperature 98 8 °F (37 1 °C), temperature source Oral, height 5' 2" (1 575 m), weight 78 kg (172 lb), last menstrual period 2020, not currently breastfeeding  Body mass index is 31 46 kg/m²      Physical Exam   : normal appearing external genitalia, urethral meatus midline, no cystocele or rectocele, thin homogeneous grey discharge coating cervix and vaginal side wall, no vaginal bleeding noted    KOH/wet mount positive for clue cells, negative for hyphae or trichomonads  PH 5 0   Positive whiff test    UA negative for nitrite, leukocytes, blood    Assessment/Plan:    37year old female here dx of bacterial vaginosis    Bacterial vaginosis  Rx metronidazole 500mg Q 12 hour x 7 days  Discussed avoiding ETOH while taking flagyl to avoid disulfram reaction  RTC as needed    Rosalinda George MD  2020  2:39 PM

## 2020-07-09 ENCOUNTER — TELEPHONE (OUTPATIENT)
Dept: OBGYN CLINIC | Facility: CLINIC | Age: 35
End: 2020-07-09

## 2020-07-09 LAB
C TRACH DNA SPEC QL NAA+PROBE: NEGATIVE
N GONORRHOEA DNA SPEC QL NAA+PROBE: NEGATIVE

## 2020-07-09 NOTE — TELEPHONE ENCOUNTER
----- Message from Anahi Chopra MD sent at 7/9/2020  9:01 AM EDT -----  Please let patient know her gonorrhea and chlamydia testing is negative  Thank you!

## 2020-07-20 DIAGNOSIS — B37.3 VAGINAL CANDIDIASIS: Primary | ICD-10-CM

## 2020-08-02 ENCOUNTER — HOSPITAL ENCOUNTER (EMERGENCY)
Facility: HOSPITAL | Age: 35
Discharge: HOME | End: 2020-08-02
Attending: EMERGENCY MEDICINE | Admitting: EMERGENCY MEDICINE
Payer: COMMERCIAL

## 2020-08-02 VITALS
TEMPERATURE: 99.9 F | HEART RATE: 84 BPM | SYSTOLIC BLOOD PRESSURE: 122 MMHG | OXYGEN SATURATION: 98 % | RESPIRATION RATE: 16 BRPM | DIASTOLIC BLOOD PRESSURE: 74 MMHG

## 2020-08-02 DIAGNOSIS — F41.9 ANXIETY: Primary | ICD-10-CM

## 2020-08-02 DIAGNOSIS — F41.0 PANIC ATTACK: ICD-10-CM

## 2020-08-02 DIAGNOSIS — F32.A DEPRESSION: ICD-10-CM

## 2020-08-02 LAB
ATRIAL RATE: 74 BPM
P AXIS: 70 DEGREES
PR INTERVAL: 196 MS
QRS AXIS: 53 DEGREES
QRSD INTERVAL: 94 MS
QT INTERVAL: 348 MS
QTC INTERVAL: 386 MS
T WAVE AXIS: 58 DEGREES
VENTRICULAR RATE: 74 BPM

## 2020-08-02 PROCEDURE — 99284 EMERGENCY DEPT VISIT MOD MDM: CPT | Performed by: EMERGENCY MEDICINE

## 2020-08-02 PROCEDURE — 93005 ELECTROCARDIOGRAM TRACING: CPT

## 2020-08-02 PROCEDURE — 93010 ELECTROCARDIOGRAM REPORT: CPT | Performed by: INTERNAL MEDICINE

## 2020-08-02 PROCEDURE — 99283 EMERGENCY DEPT VISIT LOW MDM: CPT

## 2020-08-02 RX ORDER — ACETAMINOPHEN 325 MG/1
975 TABLET ORAL ONCE
Status: COMPLETED | OUTPATIENT
Start: 2020-08-02 | End: 2020-08-02

## 2020-08-02 RX ORDER — HYDROXYZINE HYDROCHLORIDE 25 MG/1
25 TABLET, FILM COATED ORAL EVERY 6 HOURS
Qty: 20 TABLET | Refills: 0 | Status: SHIPPED | OUTPATIENT
Start: 2020-08-02 | End: 2021-05-17

## 2020-08-02 RX ORDER — LORAZEPAM 0.5 MG/1
0.5 TABLET ORAL ONCE
Status: COMPLETED | OUTPATIENT
Start: 2020-08-02 | End: 2020-08-02

## 2020-08-02 RX ADMIN — ACETAMINOPHEN 975 MG: 325 TABLET, FILM COATED ORAL at 19:27

## 2020-08-02 RX ADMIN — LORAZEPAM 0.5 MG: 0.5 TABLET ORAL at 19:27

## 2020-08-02 NOTE — ED NOTES
Provided patient with an outpatient/partial resources list and instructions and discussed various aspects of outpatient treatment with her as requested by Dr Kelly Grant

## 2020-08-02 NOTE — ED ATTENDING ATTESTATION
Final Diagnosis:  1  Anxiety    2  Panic attack    3  Depression           I, Toshia Carbone MD, saw and evaluated the patient  All available labs and X-rays were ordered by me or the resident and have been reviewed by myself  I discussed the patient with the resident / non-physician and agree with the resident's / non-physician practitioner's findings and plan as documented in the resident's / non-physician practicitioner's note, except where noted  At this point, I agree with the current assessment done in the ED  I was present during key portions of all procedures performed unless otherwise stated  Chief Complaint   Patient presents with    Panic Attack     patient had panic attack lasting about 30minutes     This is a 37 y o  female presenting for evaluation of panic attack  Hx of depression/anxiety  Today was at the store, got upset over a failed return, left the store, got upset, started hyperventilating, pressure in chest, simdilar to prior panic attacks  Became dizzy and called 911  FEels anxious, but better than before  PReviously on xanax  Doesn't like how it feels so non-compliant  Hx of lexapro andsertraline  She stopped due to weight gain  PMH:   has a past medical history of Anxiety and Heart problem  PSH:   has a past surgical history that includes  section and Cervical biopsy  Social:  Social History     Substance and Sexual Activity   Alcohol Use Yes    Comment: socially     Social History     Tobacco Use   Smoking Status Current Every Day Smoker    Types: Cigars   Smokeless Tobacco Never Used     Social History     Substance and Sexual Activity   Drug Use Yes    Types: Marijuana    Comment: occ  PE:  Vitals:    20 1841   BP: 122/74   Pulse: 84   Resp: 16   Temp: 99 9 °F (37 7 °C)   TempSrc: Oral   SpO2: 98%   General: VSS, NAD, awake, alert  Well-nourished, well-developed  Appears stated age  Speaking normally in full sentences     Head: Normocephalic, atraumatic, nontender  Eyes: PERRL, EOM-I  No diplopia  No hyphema  No subconjunctival hemorrhages  Symmetrical lids  ENT: Atraumatic external nose and ears  MMM  No malocclusion  No stridor  Normal phonation  No drooling  Normal swallowing  Neck: Symmetric, trachea midline  No JVD  CV: RRR  +S1/S2  No murmurs or gallops  Peripheral pulses +2 throughout  No chest wall tenderness  Lungs:   Unlabored No retractions  CTAB, lungs sounds equal bilateral    No tachypnea  Abd: +BS, soft, NT/ND    MSK:   FROM   Back:   No rashes  Skin: Dry, intact  Neuro: AAOx3, GCS 15, CN II-XII grossly intact  Motor grossly intact  Psychiatric/Behavioral: Appears mildly anxious   Exam: deferred  A:  - Triggered Anxiety, improving without intervention  P:  - SUpportive care  - CRISIS  - outpatient resources: no si / hi / ah / vh  - 13 point ROS was performed and all are normal unless stated in the history above  - Nursing note reviewed  Vitals reviewed  - Orders placed by myself and/or advanced practitioner / resident     - Previous chart was reviewed  - No language barrier    - History obtained from patient  - There are no limitations to the history obtained  - Critical care time: Not applicable for this patient       Code Status: No Order  Advance Directive and Living Will:      Power of :    POLST:      Medications   LORazepam (ATIVAN) tablet 0 5 mg (0 5 mg Oral Given 8/2/20 1927)   acetaminophen (TYLENOL) tablet 975 mg (975 mg Oral Given 8/2/20 1927)     No orders to display     Orders Placed This Encounter   Procedures    EKG RESULTS    ECG 12 lead    ECG 12 lead     Labs Reviewed - No data to display  Time reflects when diagnosis was documented in both MDM as applicable and the Disposition within this note     Time User Action Codes Description Comment    8/2/2020  8:16 PM Rusty Carbajal [F41 9] Anxiety     8/2/2020  8:16 PM Chico Jacob [F41 0] Panic attack 8/2/2020  8:16 PM Getachew Hernandez Add [F32 9] Depression       ED Disposition     ED Disposition Condition Date/Time Comment    Discharge Stable [place] Aug 2, 2020  8:16 PM Renetta Weems discharge to home/self care  Follow-up Information     Follow up With Specialties Details Why Contact Info Additional [address] Emergency Department Emergency Medicine Go to  If symptoms worsen [address]  [phone]  ED, [address] [facility], [place], [place], [facility] [address]    [address]  Call  To find [phone]           Discharge Medication List as of 8/2/2020  8:27 PM      START taking these medications    Details   hydrOXYzine HCL (ATARAX) 25 mg tablet Take 1 tablet (25 mg total) by mouth every 6 (six) hours for 5 days, Starting Sun 8/2/2020, Until Fri 8/7/2020, Normal         CONTINUE these medications which have NOT CHANGED    Details   acetaminophen (TYLENOL) 325 mg tablet Take 325 mg by mouth as needed for mild pain, Historical Med      metoprolol tartrate (LOPRESSOR) 25 mg tablet Take 1 tablet (25 mg total) by mouth see administration instructions Take 1 tablet( 25 mg total )1 hour prior to procedure, Starting Tue 1/21/2020, Normal      miconazole (MONISTAT 7) 100 mg vaginal suppository Insert 1 suppository (100 mg total) into the vagina daily at bedtime, Starting Mon 7/20/2020, Normal      sertraline (ZOLOFT) 25 mg tablet Take 1 tablet (25 mg total) by mouth daily, Starting Mon 1/28/2019, Normal           No discharge procedures on file  Prior to Admission Medications   Prescriptions Last Dose Informant Patient Reported?  Taking?   acetaminophen (TYLENOL) 325 mg tablet Not Taking at Unknown time Self Yes No   Sig: Take 325 mg by mouth as needed for mild pain   metoprolol tartrate (LOPRESSOR) 25 mg tablet Not Taking at Unknown time  No No   Sig: Take 1 tablet (25 mg total) by mouth see administration instructions Take 1 tablet( 25 mg total )1 hour prior to procedure   Patient not taking: Reported on 7/7/2020   miconazole (MONISTAT 7) 100 mg vaginal suppository Not Taking at Unknown time  No No   Sig: Insert 1 suppository (100 mg total) into the vagina daily at bedtime   Patient not taking: Reported on 8/2/2020   sertraline (ZOLOFT) 25 mg tablet Not Taking at Unknown time Self No No   Sig: Take 1 tablet (25 mg total) by mouth daily   Patient not taking: Reported on 8/2/2020      Facility-Administered Medications: None       Portions of the record may have been created with voice recognition software  Occasional wrong word or "sound a like" substitutions may have occurred due to the inherent limitations of voice recognition software  Read the chart carefully and recognize, using context, where substitutions have occurred      Electronically signed by:  Silvano Jimenez

## 2020-08-02 NOTE — ED PROVIDER NOTES
History  Chief Complaint   Patient presents with    Panic Attack     patient had panic attack lasting about 30minutes     52-year-old female history of depression anxiety presents with panic attack  She was at [facility], became upset over an issue returning purchased item, left the store upset, was hyperventilating and dizzy, had some substernal pressure and tingling in her hands bilaterally, similar to prior panic attacks  Here in the ED she says that her anxiety is a 6/10, not as severe as when she was having panic attack  Her physical symptoms are mostly resolved at this point  Patient previously on sertraline, Lexapro but not taking currently, 1 of which she was only on briefly the other she was not able tolerate due to side effect of increased appetite and weight gain  She was prescribed p r n  Xanax which she is not taking currently  Patient says that she has been battling anxiety for years  She feels significant life stresses a single mother of 3  She endorses occasional passive suicidal ideations but says that she would never hurt herself  Denies any hallucinations or thoughts of hurting anyone else  Drinks occasionally  Smokes marijuana regularly as her coping mechanism  Denies abdominal pain, cough, fever, chills, nausea, vomiting, diarrhea, urinary symptoms, rash, recent illness, known sick contacts  Prior to Admission Medications   Prescriptions Last Dose Informant Patient Reported?  Taking?   acetaminophen (TYLENOL) 325 mg tablet Not Taking at Unknown time Self Yes No   Sig: Take 325 mg by mouth as needed for mild pain   metoprolol tartrate (LOPRESSOR) 25 mg tablet Not Taking at Unknown time  No No   Sig: Take 1 tablet (25 mg total) by mouth see administration instructions Take 1 tablet( 25 mg total )1 hour prior to procedure   Patient not taking: Reported on 7/7/2020   miconazole (MONISTAT 7) 100 mg vaginal suppository Not Taking at Unknown time  No No   Sig: Insert 1 suppository (100 mg total) into the vagina daily at bedtime   Patient not taking: Reported on 2020   sertraline (ZOLOFT) 25 mg tablet Not Taking at Unknown time Self No No   Sig: Take 1 tablet (25 mg total) by mouth daily   Patient not taking: Reported on 2020      Facility-Administered Medications: None       Past Medical History:   Diagnosis Date    Anxiety     Heart problem     swelling around her heart       Past Surgical History:   Procedure Laterality Date    CERVICAL BIOPSY       SECTION         Family History   Problem Relation Age of Onset    Hypertension Mother     Hypertension Father      I have reviewed and agree with the history as documented  E-Cigarette/Vaping    E-Cigarette Use Never User      E-Cigarette/Vaping Substances    Nicotine No     THC No     CBD No     Flavoring No     Other No     Unknown No      Social History     Tobacco Use    Smoking status: Current Every Day Smoker     Types: Cigars    Smokeless tobacco: Never Used   Substance Use Topics    Alcohol use: Yes     Comment: socially    Drug use: Yes     Types: Marijuana     Comment: occ  Review of Systems   Constitutional: Negative for chills and fever  HENT: Negative for ear pain, sinus pain and sore throat  Eyes: Negative for pain  Respiratory: Positive for shortness of breath  Cardiovascular: Positive for chest pain (heaviness)  Gastrointestinal: Negative for abdominal pain, diarrhea, nausea and vomiting  Genitourinary: Negative for difficulty urinating and flank pain  Musculoskeletal: Negative for back pain and neck pain  Neurological: Positive for dizziness and numbness  Negative for headaches  Psychiatric/Behavioral: Positive for agitation and dysphoric mood  All other systems reviewed and are negative        Physical Exam  ED Triage Vitals   Temperature Pulse Respirations Blood Pressure SpO2   20 1841 20 1841 20 1841 20 1841 20 1841   99 9 °F (37 7 °C) 84 16 122/74 98 %      Temp Source Heart Rate Source Patient Position - Orthostatic VS BP Location FiO2 (%)   08/02/20 1841 -- -- -- --   Oral          Pain Score       08/02/20 1927       5             Orthostatic Vital Signs  Vitals:    08/02/20 1841   BP: 122/74   Pulse: 84       Physical Exam  Vitals signs and nursing note reviewed  Constitutional:       Appearance: Normal appearance  She is well-developed  She is not ill-appearing, toxic-appearing or diaphoretic  HENT:      Head: Normocephalic  Nose: Nose normal    Eyes:      General: No scleral icterus  Right eye: No discharge  Left eye: No discharge  Extraocular Movements: Extraocular movements intact  Conjunctiva/sclera: Conjunctivae normal       Pupils: Pupils are equal, round, and reactive to light  Neck:      Musculoskeletal: Normal range of motion  Cardiovascular:      Rate and Rhythm: Normal rate and regular rhythm  Pulmonary:      Effort: Pulmonary effort is normal  No respiratory distress  Breath sounds: Normal breath sounds  No stridor  Abdominal:      General: There is no distension  Palpations: Abdomen is soft  Tenderness: There is no abdominal tenderness  There is no guarding or rebound  Skin:     General: Skin is warm and dry  Capillary Refill: Capillary refill takes less than 2 seconds  Neurological:      Mental Status: She is alert and oriented to person, place, and time  Cranial Nerves: No cranial nerve deficit  Psychiatric:         Behavior: Behavior normal          Thought Content:  Thought content normal          Judgment: Judgment normal       Comments: Upset, tearful         ED Medications  Medications   LORazepam (ATIVAN) tablet 0 5 mg (0 5 mg Oral Given 8/2/20 1927)   acetaminophen (TYLENOL) tablet 975 mg (975 mg Oral Given 8/2/20 1927)       Diagnostic Studies  Results Reviewed     None                 No orders to display         Procedures  Procedures      ED Course       US AUDIT      Most Recent Value   Initial Alcohol Screen: US AUDIT-C    1  How often do you have a drink containing alcohol? 2 Filed at: 08/02/2020 1842   2  How many drinks containing alcohol do you have on a typical day you are drinking? 2 Filed at: 08/02/2020 1842   3a  Male UNDER 65: How often do you have five or more drinks on one occasion? 1 Filed at: 08/02/2020 1842   3b  FEMALE Any Age, or MALE 65+: How often do you have 4 or more drinks on one occassion? 1 Filed at: 08/02/2020 1842   Audit-C Score  6 Filed at: 08/02/2020 1842                  DONNELL/DAST-10      Most Recent Value   How many times in the past year have you    Used an illegal drug or used a prescription medication for non-medical reasons? Daily or Almost Daily Filed at: 08/02/2020 1842   In the past 12 months      1  Have you used drugs other than those required for medical reasons? 1 Filed at: 08/02/2020 1842   2  Do you use more than one drug at a time? 1 Filed at: 08/02/2020 1842   3  Have you had medical problems as a result of your drug use (e g , memory loss, hepatitis, convulsions, bleeding, etc )? 0 Filed at: 08/02/2020 1842   4  Have you had "blackouts" or "flashbacks" as a result of drug use? YesNo  0 Filed at: 08/02/2020 1842   5  Do you ever feel bad or guilty about your drug use? 0 Filed at: 08/02/2020 1842   6  Does your spouse (or parent) ever complain about your involvement with drugs? 0 Filed at: 08/02/2020 1842   7  Have you neglected your family because of your use of drugs? 0 Filed at: 08/02/2020 1842   8  Have you engaged in illegal activities in order to obtain drugs? 0 Filed at: 08/02/2020 1842   9  Have you ever experienced withdrawal symptoms (felt sick) when you stopped taking drugs? 0 Filed at: 08/02/2020 1842   10   Are you always able to stop using drugs when you want to?  0 Filed at: 08/02/2020 1842   DAST-10 Score  2 Filed at: 08/02/2020 1842                              MDM  Number of Diagnoses or Management Options  Anxiety:   Depression:   Panic attack:   Diagnosis management comments: Will give small dose Ativan, perform EKG  Has previously had her thyroid checked  Will give short course hydroxyzine to bridge her to her next appointment with therapist/PCP  Will provide resources via crisis  Will treat headache with Tylenol  Patient appreciative and says she feels better after receiving medication  PCP follow-up, return precautions discussed          Disposition  Final diagnoses:   Anxiety   Panic attack   Depression     Time reflects when diagnosis was documented in both MDM as applicable and the Disposition within this note     Time User Action Codes Description Comment    8/2/2020  8:16 PM Bernabe Eisenmenger Add [F41 9] Anxiety     8/2/2020  8:16 PM Bernabe Eisenmenger Add [F41 0] Panic attack     8/2/2020  8:16 PM Bernabe Eisenmenger Add [F32 9] Depression       ED Disposition     ED Disposition Condition Date/Time Comment    Discharge Stable Sun Aug 2, 2020  8:16 PM Corky Leonard discharge to home/self care              Follow-up Information     Follow up With Specialties Details Why Contact Info Additional [address] Emergency Department Emergency Medicine Go to  If symptoms worsen [address]  [phone]  ED, [address] [place] [address] 108    [address]  Call  To find [phone]             Discharge Medication List as of 8/2/2020  8:27 PM      START taking these medications    Details   hydrOXYzine HCL (ATARAX) 25 mg tablet Take 1 tablet (25 mg total) by mouth every 6 (six) hours for 5 days, Starting Sun 8/2/2020, Until Fri 8/7/2020, Normal         CONTINUE these medications which have NOT CHANGED    Details   acetaminophen (TYLENOL) 325 mg tablet Take 325 mg by mouth as needed for mild pain, Historical Med      metoprolol tartrate (LOPRESSOR) 25 mg tablet Take 1 tablet (25 mg total) by mouth see administration instructions Take 1 tablet( 25 mg total )1 hour prior to procedure, Starting Tue 1/21/2020, Normal      miconazole (MONISTAT 7) 100 mg vaginal suppository Insert 1 suppository (100 mg total) into the vagina daily at bedtime, Starting Mon 7/20/2020, Normal      sertraline (ZOLOFT) 25 mg tablet Take 1 tablet (25 mg total) by mouth daily, Starting Mon 1/28/2019, Normal           No discharge procedures on file  PDMP Review     None           ED Provider  Attending physically available and evaluated Darius Dumont  KAELYN managed the patient along with the ED Attending      Electronically Signed by         Ana Maria Andrade MD  08/03/20 8953

## 2020-08-03 NOTE — DISCHARGE INSTRUCTIONS
Please follow-up with PCP or psychiatrist to resume treatment with an SSRI  This class of medications helps prevent panic attacks

## 2020-08-03 NOTE — ED NOTES
Pt stated she is feeling better and more calm   Dr Marie Espinosa informed       Emily Otto, RN  08/02/20 2016

## 2020-09-12 ENCOUNTER — APPOINTMENT (EMERGENCY)
Dept: RADIOLOGY | Facility: HOSPITAL | Age: 35
End: 2020-09-12
Payer: COMMERCIAL

## 2020-09-12 ENCOUNTER — HOSPITAL ENCOUNTER (EMERGENCY)
Facility: HOSPITAL | Age: 35
Discharge: HOME | End: 2020-09-12
Attending: EMERGENCY MEDICINE | Admitting: EMERGENCY MEDICINE
Payer: COMMERCIAL

## 2020-09-12 VITALS
SYSTOLIC BLOOD PRESSURE: 101 MMHG | TEMPERATURE: 99.6 F | RESPIRATION RATE: 18 BRPM | WEIGHT: 170 LBS | HEART RATE: 70 BPM | BODY MASS INDEX: 31.09 KG/M2 | OXYGEN SATURATION: 100 % | DIASTOLIC BLOOD PRESSURE: 59 MMHG
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
## 2020-09-12 DIAGNOSIS — R51.9 HEADACHE: ICD-10-CM

## 2020-09-12 LAB
ANION GAP SERPL CALCULATED.3IONS-SCNC: 3 MMOL/L (ref 4–13)
BUN SERPL-MCNC: 8 MG/DL (ref 5–25)
CALCIUM SERPL-MCNC: 8.7 MG/DL (ref 8.3–10.1)
CHLORIDE SERPL-SCNC: 111 MMOL/L (ref 100–108)
CO2 SERPL-SCNC: 30 MMOL/L (ref 21–32)
CREAT SERPL-MCNC: 0.78 MG/DL (ref 0.6–1.3)
GFR SERPL CREATININE-BSD FRML MDRD: 113 ML/MIN/1.73SQ M
GLUCOSE SERPL-MCNC: 91 MG/DL (ref 65–140)
POTASSIUM SERPL-SCNC: 3.5 MMOL/L (ref 3.5–5.3)
SODIUM SERPL-SCNC: 144 MMOL/L (ref 136–145)

## 2020-09-12 PROCEDURE — 70496 CT ANGIOGRAPHY HEAD: CPT

## 2020-09-12 PROCEDURE — 96374 THER/PROPH/DIAG INJ IV PUSH: CPT

## 2020-09-12 PROCEDURE — 99284 EMERGENCY DEPT VISIT MOD MDM: CPT

## 2020-09-12 PROCEDURE — 70498 CT ANGIOGRAPHY NECK: CPT

## 2020-09-12 PROCEDURE — 80048 BASIC METABOLIC PNL TOTAL CA: CPT | Performed by: EMERGENCY MEDICINE

## 2020-09-12 PROCEDURE — 96376 TX/PRO/DX INJ SAME DRUG ADON: CPT

## 2020-09-12 PROCEDURE — G1004 CDSM NDSC: HCPCS

## 2020-09-12 PROCEDURE — 36415 COLL VENOUS BLD VENIPUNCTURE: CPT | Performed by: EMERGENCY MEDICINE

## 2020-09-12 PROCEDURE — 96375 TX/PRO/DX INJ NEW DRUG ADDON: CPT

## 2020-09-12 PROCEDURE — 99284 EMERGENCY DEPT VISIT MOD MDM: CPT | Performed by: EMERGENCY MEDICINE

## 2020-09-12 RX ORDER — KETOROLAC TROMETHAMINE 30 MG/ML
15 INJECTION, SOLUTION INTRAMUSCULAR; INTRAVENOUS ONCE
Status: DISCONTINUED | OUTPATIENT
Start: 2020-09-12 | End: 2020-09-12

## 2020-09-12 RX ORDER — NAPROXEN 500 MG/1
500 TABLET ORAL 2 TIMES DAILY WITH MEALS
Qty: 30 TABLET | Refills: 0 | Status: SHIPPED | OUTPATIENT
Start: 2020-09-12 | End: 2021-05-17

## 2020-09-12 RX ORDER — LIDOCAINE 50 MG/G
1 PATCH TOPICAL DAILY
Qty: 30 PATCH | Refills: 0 | Status: SHIPPED | OUTPATIENT
Start: 2020-09-12 | End: 2021-05-17

## 2020-09-12 RX ORDER — METOCLOPRAMIDE 10 MG/1
10 TABLET ORAL ONCE
Status: COMPLETED | OUTPATIENT
Start: 2020-09-12 | End: 2020-09-12

## 2020-09-12 RX ORDER — DIPHENHYDRAMINE HYDROCHLORIDE 50 MG/ML
25 INJECTION INTRAMUSCULAR; INTRAVENOUS ONCE
Status: COMPLETED | OUTPATIENT
Start: 2020-09-12 | End: 2020-09-12

## 2020-09-12 RX ORDER — DIPHENHYDRAMINE HCL 25 MG
25 TABLET ORAL ONCE
Status: COMPLETED | OUTPATIENT
Start: 2020-09-12 | End: 2020-09-12

## 2020-09-12 RX ORDER — KETOROLAC TROMETHAMINE 30 MG/ML
15 INJECTION, SOLUTION INTRAMUSCULAR; INTRAVENOUS ONCE
Status: COMPLETED | OUTPATIENT
Start: 2020-09-12 | End: 2020-09-12

## 2020-09-12 RX ADMIN — DEXAMETHASONE 10 MG: 4 TABLET ORAL at 15:52

## 2020-09-12 RX ADMIN — DIPHENHYDRAMINE HYDROCHLORIDE 25 MG: 50 INJECTION, SOLUTION INTRAMUSCULAR; INTRAVENOUS at 19:54

## 2020-09-12 RX ADMIN — KETOROLAC TROMETHAMINE 15 MG: 30 INJECTION, SOLUTION INTRAMUSCULAR at 19:54

## 2020-09-12 RX ADMIN — METOCLOPRAMIDE 10 MG: 10 TABLET ORAL at 15:52

## 2020-09-12 RX ADMIN — IOHEXOL 85 ML: 350 INJECTION, SOLUTION INTRAVENOUS at 17:57

## 2020-09-12 RX ADMIN — KETOROLAC TROMETHAMINE 15 MG: 30 INJECTION, SOLUTION INTRAMUSCULAR at 15:52

## 2020-09-12 RX ADMIN — DIPHENHYDRAMINE HCL 25 MG: 25 TABLET ORAL at 15:52
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
## 2021-05-17 ENCOUNTER — ANNUAL EXAM (OUTPATIENT)
Dept: OBGYN CLINIC | Facility: CLINIC | Age: 36
End: 2021-05-17

## 2021-05-17 VITALS
HEART RATE: 80 BPM | WEIGHT: 185 LBS | HEIGHT: 62 IN | DIASTOLIC BLOOD PRESSURE: 78 MMHG | SYSTOLIC BLOOD PRESSURE: 123 MMHG | BODY MASS INDEX: 34.04 KG/M2

## 2021-05-17 DIAGNOSIS — Z01.419 ENCOUNTER FOR GYNECOLOGICAL EXAMINATION WITHOUT ABNORMAL FINDING: Primary | ICD-10-CM

## 2021-05-17 DIAGNOSIS — Z12.4 SCREENING FOR CERVICAL CANCER: ICD-10-CM

## 2021-05-17 DIAGNOSIS — Z12.39 ENCOUNTER FOR BREAST CANCER SCREENING USING NON-MAMMOGRAM MODALITY: ICD-10-CM

## 2021-05-17 DIAGNOSIS — Z11.3 SCREEN FOR STD (SEXUALLY TRANSMITTED DISEASE): ICD-10-CM

## 2021-05-17 DIAGNOSIS — N89.8 VAGINAL DISCHARGE: ICD-10-CM

## 2021-05-17 PROBLEM — F12.10 MARIJUANA ABUSE: Status: RESOLVED | Noted: 2019-01-28 | Resolved: 2021-05-17

## 2021-05-17 PROBLEM — R94.31 ABNORMAL EKG: Status: RESOLVED | Noted: 2019-12-31 | Resolved: 2021-05-17

## 2021-05-17 PROBLEM — F17.200 CURRENT EVERY DAY SMOKER: Status: RESOLVED | Noted: 2019-12-31 | Resolved: 2021-05-17

## 2021-05-17 PROBLEM — D64.9 ANEMIA: Status: RESOLVED | Noted: 2019-12-31 | Resolved: 2021-05-17

## 2021-05-17 PROBLEM — R07.9 CHEST PAIN: Status: RESOLVED | Noted: 2019-01-28 | Resolved: 2021-05-17

## 2021-05-17 PROBLEM — F41.9 ANXIETY: Status: RESOLVED | Noted: 2019-01-28 | Resolved: 2021-05-17

## 2021-05-17 PROBLEM — B96.89 BACTERIAL VAGINOSIS: Status: RESOLVED | Noted: 2019-03-21 | Resolved: 2021-05-17

## 2021-05-17 PROBLEM — N76.0 BACTERIAL VAGINOSIS: Status: RESOLVED | Noted: 2019-03-21 | Resolved: 2021-05-17

## 2021-05-17 LAB
BV WHIFF TEST VAG QL: NEGATIVE
CLUE CELLS SPEC QL WET PREP: NEGATIVE
PH SMN: 3.5 [PH]
SL AMB POCT WET MOUNT: NORMAL
T VAGINALIS VAG QL WET PREP: NEGATIVE
YEAST VAG QL WET PREP: NEGATIVE

## 2021-05-17 PROCEDURE — G0145 SCR C/V CYTO,THINLAYER,RESCR: HCPCS | Performed by: NURSE PRACTITIONER

## 2021-05-17 PROCEDURE — 87624 HPV HI-RISK TYP POOLED RSLT: CPT | Performed by: NURSE PRACTITIONER

## 2021-05-17 PROCEDURE — 87210 SMEAR WET MOUNT SALINE/INK: CPT | Performed by: NURSE PRACTITIONER

## 2021-05-17 PROCEDURE — 99395 PREV VISIT EST AGE 18-39: CPT | Performed by: NURSE PRACTITIONER

## 2021-05-17 PROCEDURE — 87591 N.GONORRHOEAE DNA AMP PROB: CPT | Performed by: NURSE PRACTITIONER

## 2021-05-17 PROCEDURE — 87491 CHLMYD TRACH DNA AMP PROBE: CPT | Performed by: NURSE PRACTITIONER
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
## 2021-05-17 NOTE — PROGRESS NOTES
ANNUAL GYNECOLOGICAL EXAMINATION    Aleksey Loyola is a 38 y o  female who presents today for annual GYN exam   Her last pap smear was performed 2018 and result was ASCUS with + HPV and colposcopy was performed 2019 and was WNL  She reports menses as regular  Patient's last menstrual period was 2021 (approximate)  Her contraceptive method is BTL  Her general medical history has been reviewed and she reports it as follows:    Past Medical History:   Diagnosis Date    Abnormal Pap smear of cervix      colpo WNL;     Anxiety     no meds since     Gonorrhea     Heart problem 2017    swelling of L side of heart - hospitalized x7 days @[place], unsure of specifics, no issues thereafter     Past Surgical History:   Procedure Laterality Date     SECTION      TUBAL LIGATION       OB History        5    Para   3    Term   3       0    AB   2    Living   3       SAB   0    TAB   2    Ectopic   0    Multiple   0    Live Births   3               Social History     Tobacco Use    Smoking status: Former Smoker     Types: Cigars, Cigarettes     Quit date:      Years since quittin 3    Smokeless tobacco: Never Used   Substance Use Topics    Alcohol use: Yes     Frequency: 2-4 times a month     Drinks per session: 3 or 4    Drug use: Yes     Types: Marijuana     Comment: daily marijuana use     Cancer-related family history includes Cancer in her maternal uncle  There is no history of Breast cancer, Colon cancer, or Ovarian cancer  No current outpatient medications    Review of Systems:  Review of Systems   Constitutional: Negative  Gastrointestinal: Negative  Genitourinary: Positive for vaginal discharge  Negative for difficulty urinating, menstrual problem and pelvic pain  Skin: Negative          Physical Exam:  /78 (BP Location: Left arm, Patient Position: Sitting, Cuff Size: Standard)   Pulse 80   Ht 5' 2" (1 575 m)   Wt 83 9 kg (185 lb)   LMP 04/22/2021 (Approximate)   BMI 33 84 kg/m²   Physical Exam  Constitutional:       General: She is not in acute distress  Appearance: She is well-developed  Genitourinary:      Vulva, vagina and uterus normal       No lesions in the vagina  No cervical motion tenderness or lesion  Uterus is not tender  No right or left adnexal mass present  Right adnexa not tender  Left adnexa not tender  Neck:      Musculoskeletal: Neck supple  Thyroid: No thyromegaly  Cardiovascular:      Rate and Rhythm: Normal rate and regular rhythm  Pulmonary:      Effort: Pulmonary effort is normal    Chest:      Breasts:         Right: No mass, nipple discharge, skin change or tenderness  Left: No mass, nipple discharge, skin change or tenderness  Abdominal:      Palpations: Abdomen is soft  Tenderness: There is no abdominal tenderness  Neurological:      Mental Status: She is alert and oriented to person, place, and time  Skin:     General: Skin is warm and dry  Vitals signs reviewed  Point of Care Testing:   -Wet mount: no clue cells, no trichomonads, rare WBC, pH=3 5, + lactobacillus   -KOH mount: no hyphae   -Whiff: negative    Assessment/Plan:   1  Normal well-woman GYN exam   2  Cervical cancer screening:  Normal cervical exam   Pap smear done with HPV co-testing  3  STD screening:  Orders placed for vaginal GC/CT cultures  Orders placed for serum anti-HIV, anti-HCV, HbsAg, RPR    4  Breast cancer screening:  Normal breast exam   Reviewed breast self-awareness  5  Depression Screening: Patient's depression screening was assessed with a PHQ-2 score of 0  Their PHQ-9 score was 3  Clinically patient does not have depression  No treatment is required  6  BMI Counseling: Body mass index is 33 84 kg/m²  Discussed the patient's BMI with her  The BMI is above normal  Patient referred to PCP due to patient being obese     7  Contraception: BTL    8  Return to office in 1 year for annual GYN exam

## 2021-05-17 NOTE — PATIENT INSTRUCTIONS
OBESITY     Obesity is defined as a body mass index (BMI) which is greater than 30  Your Body mass index is 33 84 kg/m²       The risks of obesity include  many health problems, such as injuries or physical disability  You may need tests to check for the following:  · Diabetes     · High blood pressure or high cholesterol     · Heart disease     · Gallbladder or liver disease     · Cancer of the colon, breast, prostate, liver, or kidney     · Sleep apnea     · Arthritis or gout    Seek care immediately if:   · You have a severe headache, confusion, or difficulty speaking  · You have weakness on one side of your body  · You have chest pain, sweating, or shortness of breath  Contact your healthcare provider if:   · You have symptoms of gallbladder or liver disease, such as pain in your upper abdomen  · You have knee or hip pain and discomfort while walking  · You have symptoms of diabetes, such as intense hunger and thirst, and frequent urination  · You have symptoms of sleep apnea, such as snoring or daytime sleepiness  · You have questions or concerns about your condition or care  Treatment for obesity  focuses on helping you lose weight to improve your health  Even a small decrease in BMI can reduce the risk for many health problems  Your healthcare provider will help you set a weight-loss goal   · Lifestyle changes  are the first step in treating obesity  These include making healthy food choices and getting regular physical activity  Your healthcare provider may suggest a weight-loss program that involves coaching, education, and therapy  · Medicine  may help you lose weight when it is used with a healthy diet and physical activity  · Surgery  can help you lose weight if you are very obese and have other health problems  There are several types of weight-loss surgery  Ask your healthcare provider for more information      Be successful losing weight:   · Set small, realistic goals  An example of a small goal is to walk for 20 minutes 5 days a week  Anther goal is to lose 5% of your body weight  · Tell friends, family members, and coworkers about your goals  and ask for their support  Ask a friend to lose weight with you, or join a weight-loss support group  · Identify foods or triggers that may cause you to overeat , and find ways to avoid them  Remove tempting high-calorie foods from your home and workplace  Place a bowl of fresh fruit on your kitchen counter  If stress causes you to eat, then find other ways to cope with stress  · Keep a diary to track what you eat and drink  Also write down how many minutes of physical activity you do each day  Weigh yourself once a week and record it in your diary  Eating changes: You will need to eat 500 to 1,000 fewer calories each day than you currently eat to lose 1 to 2 pounds a week  The following changes will help you cut calories:  · Eat smaller portions  Use small plates, no larger than 9 inches in diameter  Fill your plate half full of fruits and vegetables  Measure your food using measuring cups until you know what a serving size looks like  · Eat 3 meals and 1 or 2 snacks each day  Plan your meals in advance  Ivonne Herita and eat at home most of the time  Eat slowly  · Eat fruits and vegetables at every meal   They are low in calories and high in fiber, which makes you feel full  Do not add butter, margarine, or cream sauce to vegetables  Use herbs to season steamed vegetables  · Eat less fat and fewer fried foods  Eat more baked or grilled chicken and fish  These protein sources are lower in calories and fat than red meat  Limit fast food  Dress your salads with olive oil and vinegar instead of bottled dressing  · Limit the amount of sugar you eat  Do not drink sugary beverages  Limit alcohol  Activity changes:  Physical activity is good for your body in many ways   It helps you burn calories and build strong muscles  It decreases stress and depression, and improves your mood  It can also help you sleep better  Talk to your healthcare provider before you begin an exercise program   · Exercise for at least 30 minutes 5 days a week  Start slowly  Set aside time each day for physical activity that you enjoy and that is convenient for you  It is best to do both weight training and an activity that increases your heart rate, such as walking, bicycling, or swimming  · Find ways to be more active  Do yard work and housecleaning  Walk up the stairs instead of using elevators  Spend your leisure time going to events that require walking, such as outdoor festivals or fairs  This extra physical activity can help you lose weight and keep it off  Follow up with your primary healthcare provider as directed  You may need to meet with a dietitian  Write down your questions so you remember to ask them during your visits

## 2021-05-17 NOTE — LETTER
2022    To Amber Ng  : 1985      This letter is to advise you that your recent BLOODWORK for Sexually-Transmitted Diseases (HIV, hepatitis B, hepatitis C, and syphilis) results were reviewed by me and are NORMAL  Please contact our office for an appointment if you have any additional concerns      JONATHON Morel

## 2021-05-17 NOTE — LETTER
2021    To Amber Ng  : 1985      This letter is to advise you that your recent PAP SMEAR results were reviewed by me and are NORMAL    We will see you in 1 year for your annual exam     JONATHON Morel

## 2021-05-19 LAB
C TRACH DNA SPEC QL NAA+PROBE: NEGATIVE
HPV HR 12 DNA CVX QL NAA+PROBE: NEGATIVE
HPV16 DNA CVX QL NAA+PROBE: NEGATIVE
HPV18 DNA CVX QL NAA+PROBE: NEGATIVE
N GONORRHOEA DNA SPEC QL NAA+PROBE: NEGATIVE

## 2021-05-21 LAB
LAB AP GYN PRIMARY INTERPRETATION: NORMAL
Lab: NORMAL

## 2021-07-09 ENCOUNTER — HOSPITAL ENCOUNTER (EMERGENCY)
Facility: HOSPITAL | Age: 36
Discharge: HOME | End: 2021-07-09
Attending: EMERGENCY MEDICINE | Admitting: EMERGENCY MEDICINE
Payer: COMMERCIAL

## 2021-07-09 ENCOUNTER — APPOINTMENT (EMERGENCY)
Dept: RADIOLOGY | Facility: HOSPITAL | Age: 36
End: 2021-07-09
Payer: COMMERCIAL

## 2021-07-09 VITALS
HEART RATE: 70 BPM | WEIGHT: 185 LBS | BODY MASS INDEX: 33.84 KG/M2 | TEMPERATURE: 97.8 F | SYSTOLIC BLOOD PRESSURE: 118 MMHG | DIASTOLIC BLOOD PRESSURE: 68 MMHG | OXYGEN SATURATION: 100 % | RESPIRATION RATE: 17 BRPM

## 2021-07-09 DIAGNOSIS — M54.50 ACUTE RIGHT-SIDED LOW BACK PAIN WITHOUT SCIATICA: ICD-10-CM

## 2021-07-09 DIAGNOSIS — M54.9 BACK PAIN: Primary | ICD-10-CM

## 2021-07-09 DIAGNOSIS — M54.9 MUSCULOSKELETAL BACK PAIN: ICD-10-CM

## 2021-07-09 PROCEDURE — 71046 X-RAY EXAM CHEST 2 VIEWS: CPT

## 2021-07-09 PROCEDURE — 96372 THER/PROPH/DIAG INJ SC/IM: CPT

## 2021-07-09 PROCEDURE — 99284 EMERGENCY DEPT VISIT MOD MDM: CPT

## 2021-07-09 PROCEDURE — 99284 EMERGENCY DEPT VISIT MOD MDM: CPT | Performed by: EMERGENCY MEDICINE

## 2021-07-09 RX ORDER — LIDOCAINE 50 MG/G
1 PATCH TOPICAL ONCE
Status: DISCONTINUED | OUTPATIENT
Start: 2021-07-09 | End: 2021-07-09 | Stop reason: HOSPADM

## 2021-07-09 RX ORDER — METHOCARBAMOL 500 MG/1
1000 TABLET, FILM COATED ORAL ONCE
Status: COMPLETED | OUTPATIENT
Start: 2021-07-09 | End: 2021-07-09

## 2021-07-09 RX ORDER — KETOROLAC TROMETHAMINE 30 MG/ML
30 INJECTION, SOLUTION INTRAMUSCULAR; INTRAVENOUS ONCE
Status: COMPLETED | OUTPATIENT
Start: 2021-07-09 | End: 2021-07-09

## 2021-07-09 RX ORDER — METHOCARBAMOL 500 MG/1
500 TABLET, FILM COATED ORAL 2 TIMES DAILY
Qty: 20 TABLET | Refills: 0 | Status: SHIPPED | OUTPATIENT
Start: 2021-07-09

## 2021-07-09 RX ORDER — LIDOCAINE 50 MG/G
1 PATCH TOPICAL DAILY
Qty: 15 PATCH | Refills: 0 | Status: SHIPPED | OUTPATIENT
Start: 2021-07-09 | End: 2022-07-22

## 2021-07-09 RX ORDER — LIDOCAINE 50 MG/G
1 PATCH TOPICAL ONCE
Status: DISCONTINUED | OUTPATIENT
Start: 2021-07-09 | End: 2021-07-09

## 2021-07-09 RX ORDER — KETOROLAC TROMETHAMINE 30 MG/ML
15 INJECTION, SOLUTION INTRAMUSCULAR; INTRAVENOUS ONCE
Status: DISCONTINUED | OUTPATIENT
Start: 2021-07-09 | End: 2021-07-09

## 2021-07-09 RX ORDER — IBUPROFEN 600 MG/1
600 TABLET ORAL EVERY 6 HOURS PRN
Qty: 30 TABLET | Refills: 0 | Status: SHIPPED | OUTPATIENT
Start: 2021-07-09

## 2021-07-09 RX ADMIN — METHOCARBAMOL 1000 MG: 500 TABLET, FILM COATED ORAL at 13:32

## 2021-07-09 RX ADMIN — LIDOCAINE 1 PATCH: 50 PATCH TOPICAL at 13:32

## 2021-07-09 RX ADMIN — KETOROLAC TROMETHAMINE 30 MG: 30 INJECTION, SOLUTION INTRAMUSCULAR; INTRAVENOUS at 13:32

## 2021-07-09 NOTE — DISCHARGE INSTRUCTIONS
Your were seen in the emergency department for acute low back pain  We did not find an emergent cause for your low back pain  Please return to the emergency department with worsening pain, to inability to walk, numbness tingling, paresthesias or any other concerns

## 2021-07-09 NOTE — Clinical Note
Han Aguilar was seen and treated in our emergency department on 7/9/2021  Diagnosis: Acute low back pain    Federica Lopez  may return to work on return date  She may return on this date: 07/12/2021    Han Aguilar was seen in our emergency department for acute low back pain  She may return to work on 07/12/2021  Please have her return with light work for the duration of this week  She may return to full work on 07/19/2021  If you have any questions or concerns, please don't hesitate to call        Ashwin Winkler DO    ______________________________           _______________          _______________  Hospital Representative                              Date                                Time

## 2021-07-09 NOTE — Clinical Note
Alvaro Weaver was seen and treated in our emergency department on 7/9/2021  Diagnosis: Acute low back pain    Ila Mendoza  may return to work on return date  She may return on this date: 07/12/2021    Alvaro Weaver was seen in our emergency department for acute low back pain  She may return to work on 07/12/2021  Please have her return with light work for the duration of this week  She may return to full work on 07/19/2021  If you have any questions or concerns, please don't hesitate to call        Sophia Scott, DO    ______________________________           _______________          _______________  Hospital Representative                              Date                                Time

## 2021-07-09 NOTE — ED PROVIDER NOTES
History  Chief Complaint   Patient presents with    Back Pain     Pt presents via EMS s/p moving A/C unit when she heard a pop in her back  Patient is a 17-year-old female presenting with acute back  She states that this morning she was moving an air conditioning unit and in doing so, all of the sudden felt a pop in her mid back predominantly on the right  She describes the pain as if somebody "hit her in the back with a hammer"  The pain has been constant ever since  She states that it hurts to breathe because of the pain  It is a 10/10 in severity  She also notes some numbness in the area  This radiates to her right buttocks  She called EMS and was able to walk to the stretcher  She tried using a heat pack which provided minimal relief  She is currently denying urinary retention, urinary incontinence, fecal incontinence, saddle anesthesia  None       Past Medical History:   Diagnosis Date    Abnormal Pap smear of cervix      colpo WNL;     Anxiety     no meds since 2018    Gonorrhea     Heart problem 2017    swelling of L side of heart - hospitalized x7 days @[place], unsure of specifics, no issues thereafter       Past Surgical History:   Procedure Laterality Date     SECTION      TUBAL LIGATION  2008       Family History   Problem Relation Age of Onset    Hypertension Mother     Hypertension Father     Cancer Maternal Uncle         throat    Breast cancer Neg Hx     Colon cancer Neg Hx     Ovarian cancer Neg Hx      I have reviewed and agree with the history as documented      E-Cigarette/Vaping    E-Cigarette Use Never User      E-Cigarette/Vaping Substances    Nicotine No     THC No     CBD No     Flavoring No     Other No     Unknown No      Social History     Tobacco Use    Smoking status: Former Smoker     Types: Cigars, Cigarettes     Quit date:      Years since quittin 5    Smokeless tobacco: Never Used   Vaping Use    Vaping Use: Never used   Substance Use Topics    Alcohol use: Yes    Drug use: Yes     Types: Marijuana     Comment: daily marijuana use        Review of Systems   Constitutional: Negative for chills and fever  HENT: Positive for congestion, rhinorrhea and sinus pain  Negative for sore throat  Eyes: Negative for pain  Respiratory: Negative for shortness of breath  Pain with breathing due to pain   Cardiovascular: Negative for chest pain and leg swelling  Gastrointestinal: Negative for abdominal pain, constipation, diarrhea, nausea and vomiting  Genitourinary: Negative for difficulty urinating  Musculoskeletal: Positive for back pain  Skin: Negative for rash  Neurological: Positive for headaches  Negative for syncope  Psychiatric/Behavioral: Negative for agitation  All other systems reviewed and are negative  Physical Exam  ED Triage Vitals [07/09/21 1151]   Temperature Pulse Respirations Blood Pressure SpO2   97 8 °F (36 6 °C) 77 18 124/72 100 %      Temp Source Heart Rate Source Patient Position - Orthostatic VS BP Location FiO2 (%)   Oral Monitor Sitting Left arm --      Pain Score       Worst Possible Pain             Orthostatic Vital Signs  Vitals:    07/09/21 1151 07/09/21 1407   BP: 124/72 118/68   Pulse: 77 70   Patient Position - Orthostatic VS: Sitting Lying       Physical Exam  Vitals reviewed  Constitutional:       General: She is not in acute distress  Appearance: Normal appearance  Comments: Tearful   HENT:      Head: Normocephalic and atraumatic  Right Ear: External ear normal       Left Ear: External ear normal       Nose: Nose normal    Eyes:      General:         Right eye: No discharge  Left eye: No discharge  Extraocular Movements: Extraocular movements intact  Conjunctiva/sclera: Conjunctivae normal       Pupils: Pupils are equal, round, and reactive to light  Cardiovascular:      Rate and Rhythm: Normal rate and regular rhythm  Heart sounds: No murmur heard  No friction rub  No gallop  Pulmonary:      Effort: Pulmonary effort is normal  No respiratory distress  Breath sounds: Normal breath sounds  No stridor  No wheezing, rhonchi or rales  Abdominal:      General: Abdomen is flat  There is no distension  Palpations: Abdomen is soft  There is no mass  Tenderness: There is no abdominal tenderness  There is no guarding  Musculoskeletal:         General: Swelling and tenderness present  No deformity  Cervical back: Normal range of motion  Thoracic back: Tenderness present  No deformity, signs of trauma or bony tenderness  Decreased range of motion  Lumbar back: Tenderness present  No deformity, signs of trauma, lacerations or bony tenderness  Decreased range of motion  Skin:     General: Skin is warm and dry  Neurological:      General: No focal deficit present  Mental Status: She is alert  Sensory: No sensory deficit  Motor: No weakness  Psychiatric:         Mood and Affect: Mood normal          ED Medications  Medications   lidocaine (LIDODERM) 5 % patch 1 patch (has no administration in time range)   methocarbamol (ROBAXIN) tablet 1,000 mg (1,000 mg Oral Given 7/9/21 1332)   ketorolac (TORADOL) injection 30 mg (30 mg Intramuscular Given 7/9/21 1332)       Diagnostic Studies  Results Reviewed     None                 XR chest 2 views    (Results Pending)         Procedures  Procedures      ED Course  ED Course as of Jul 09 1432   Fri Jul 09, 2021   1240 Patient re-examined, exam unchanged  Awaiting Toradol, Robaxin, lidocaine patch  SBIRT 22yo+      Most Recent Value   SBIRT (22 yo +)   In order to provide better care to our patients, we are screening all of our patients for alcohol and drug use  Would it be okay to ask you these screening questions?   No Filed at: 07/09/2021 1333                MDM  Number of Diagnoses or Management Options  Acute right-sided low back pain without sciatica  Back pain  Musculoskeletal back pain  Diagnosis management comments: Patient is a 78-year-old female presenting with acute back pain after moving an air conditioning unit  Based on history and physical exam, differential diagnosis includes muscular strain versus herniated disc  She currently does not have any red flag symptoms for back pain including urinary retention or incontinence, fecal incontinence, saddle anesthesia, history of cancer, or current corticosteroid use, or history of back surgery  She has been able to walk, does not have any midline deformities to her vertebrae  Plan: Will order IM Toradol for pain relief, as well as Robaxin  We will also provide a lidocaine patch  Patient improved with administration of Toradol and Robaxin  Chest x-ray was negative  Likely musculoskeletal origin  Will discharge home  Disposition  Final diagnoses:   Back pain   Acute right-sided low back pain without sciatica   Musculoskeletal back pain     Time reflects when diagnosis was documented in both MDM as applicable and the Disposition within this note     Time User Action Codes Description Comment    7/9/2021  1:32 PM Lia Jacob [M54 9] Back pain     7/9/2021  2:16 PM Dominic Curry [M54 5] Acute right-sided low back pain without sciatica     7/9/2021  2:16 PM Jen, [address] [M54 9] Musculoskeletal back pain       ED Disposition     ED Disposition Condition Date/Time Comment    Discharge Stable Fri Jul 9, 2021  2:16 PM Kristy Vela discharge to home/self care              Follow-up Information     Follow up With Specialties Details Why Contact Info Additional Information     [facility] Physical Therapy Schedule an appointment as soon as possible for a visit   [phone] Family Medicine Schedule an appointment as soon as possible for a visit   [address] [facility], [facility]          Patient's Medications   Discharge Prescriptions    IBUPROFEN (MOTRIN) 600 MG TABLET    Take 1 tablet (600 mg total) by mouth every 6 (six) hours as needed for mild pain or moderate pain       Start Date: 7/9/2021  End Date: --       Order Dose: 600 mg       Quantity: 30 tablet    Refills: 0    LIDOCAINE (LIDODERM) 5 %    Apply 1 patch topically daily Remove & Discard patch within 12 hours or as directed by MD       Start Date: 7/9/2021  End Date: --       Order Dose: 1 patch       Quantity: 15 patch    Refills: 0    METHOCARBAMOL (ROBAXIN) 500 MG TABLET    Take 1 tablet (500 mg total) by mouth 2 (two) times a day       Start Date: 7/9/2021  End Date: --       Order Dose: 500 mg       Quantity: 20 tablet    Refills: 0     No discharge procedures on file  PDMP Review     None           ED Provider  Attending physically available and evaluated Agustina Klein I managed the patient along with the ED Attending      Electronically Signed by         Nasima Palmer DO  07/09/21 6603

## 2021-07-13 NOTE — ED ATTENDING ATTESTATION
7/9/2021  IMaddison DO, saw and evaluated the patient  I have discussed the patient with the resident/non-physician practitioner and agree with the resident's/non-physician practitioner's findings, Plan of Care, and MDM as documented in the resident's/non-physician practitioner's note, except where noted  All available labs and Radiology studies were reviewed  I was present for key portions of any procedure(s) performed by the resident/non-physician practitioner and I was immediately available to provide assistance  At this point I agree with the current assessment done in the Emergency Department  I have conducted an independent evaluation of this patient a history and physical is as follows:    43-year-old female presents with back pain  Patient states this morning she was moving an air conditioning unit and felt a sudden pop in her mid back predominantly on the right side  Pain is worse with movement  No bowel or bladder complaints, no fevers  On exam-no acute distress, heart regular, no respiratory distress, tenderness right thoracic paraspinals, muscle strength 5/5 in extremities    Plan-Lidoderm patch, Toradol, muscle relaxer, chest x-ray    ED Course         Critical Care Time  Procedures

## 2021-12-22 ENCOUNTER — APPOINTMENT (EMERGENCY)
Dept: RADIOLOGY | Facility: HOSPITAL | Age: 36
End: 2021-12-22
Payer: COMMERCIAL

## 2021-12-22 ENCOUNTER — HOSPITAL ENCOUNTER (EMERGENCY)
Facility: HOSPITAL | Age: 36
Discharge: HOME | End: 2021-12-22
Attending: EMERGENCY MEDICINE
Payer: COMMERCIAL

## 2021-12-22 VITALS
RESPIRATION RATE: 18 BRPM | SYSTOLIC BLOOD PRESSURE: 110 MMHG | HEIGHT: 62 IN | DIASTOLIC BLOOD PRESSURE: 58 MMHG | BODY MASS INDEX: 33.13 KG/M2 | HEART RATE: 93 BPM | OXYGEN SATURATION: 100 % | TEMPERATURE: 100.3 F | WEIGHT: 180 LBS

## 2021-12-22 DIAGNOSIS — U07.1 COVID-19: Primary | ICD-10-CM

## 2021-12-22 LAB
ALBUMIN SERPL BCP-MCNC: 3.9 G/DL (ref 3.5–5)
ALP SERPL-CCNC: 58 U/L (ref 46–116)
ALT SERPL W P-5'-P-CCNC: 21 U/L (ref 12–78)
ANION GAP SERPL CALCULATED.3IONS-SCNC: 12 MMOL/L (ref 4–13)
AST SERPL W P-5'-P-CCNC: 17 U/L (ref 5–45)
ATRIAL RATE: 104 BPM
BASOPHILS # BLD AUTO: 0.01 THOUSANDS/ΜL (ref 0–0.1)
BASOPHILS NFR BLD AUTO: 0 % (ref 0–1)
BILIRUB SERPL-MCNC: 0.54 MG/DL (ref 0.2–1)
BUN SERPL-MCNC: 7 MG/DL (ref 5–25)
CALCIUM SERPL-MCNC: 9.1 MG/DL (ref 8.3–10.1)
CARDIAC TROPONIN I PNL SERPL HS: <2 NG/L
CARDIAC TROPONIN I PNL SERPL HS: <2 NG/L
CHLORIDE SERPL-SCNC: 104 MMOL/L (ref 100–108)
CO2 SERPL-SCNC: 23 MMOL/L (ref 21–32)
CREAT SERPL-MCNC: 0.85 MG/DL (ref 0.6–1.3)
D DIMER PPP FEU-MCNC: 0.42 UG/ML FEU
EOSINOPHIL # BLD AUTO: 0.08 THOUSAND/ΜL (ref 0–0.61)
EOSINOPHIL NFR BLD AUTO: 2 % (ref 0–6)
ERYTHROCYTE [DISTWIDTH] IN BLOOD BY AUTOMATED COUNT: 15.7 % (ref 11.6–15.1)
FLUAV RNA RESP QL NAA+PROBE: NEGATIVE
FLUBV RNA RESP QL NAA+PROBE: NEGATIVE
GFR SERPL CREATININE-BSD FRML MDRD: 87 ML/MIN/1.73SQ M
GLUCOSE SERPL-MCNC: 102 MG/DL (ref 65–140)
HCT VFR BLD AUTO: 29 % (ref 34.8–46.1)
HGB BLD-MCNC: 8.6 G/DL (ref 11.5–15.4)
HOLD SPECIMEN: NORMAL
IMM GRANULOCYTES # BLD AUTO: 0.01 THOUSAND/UL (ref 0–0.2)
IMM GRANULOCYTES NFR BLD AUTO: 0 % (ref 0–2)
LYMPHOCYTES # BLD AUTO: 0.93 THOUSANDS/ΜL (ref 0.6–4.47)
LYMPHOCYTES NFR BLD AUTO: 18 % (ref 14–44)
MCH RBC QN AUTO: 22.1 PG (ref 26.8–34.3)
MCHC RBC AUTO-ENTMCNC: 29.7 G/DL (ref 31.4–37.4)
MCV RBC AUTO: 74 FL (ref 82–98)
MONOCYTES # BLD AUTO: 0.48 THOUSAND/ΜL (ref 0.17–1.22)
MONOCYTES NFR BLD AUTO: 9 % (ref 4–12)
NEUTROPHILS # BLD AUTO: 3.65 THOUSANDS/ΜL (ref 1.85–7.62)
NEUTS SEG NFR BLD AUTO: 71 % (ref 43–75)
NRBC BLD AUTO-RTO: 0 /100 WBCS
P AXIS: 73 DEGREES
PLATELET # BLD AUTO: 384 THOUSANDS/UL (ref 149–390)
PMV BLD AUTO: 10.2 FL (ref 8.9–12.7)
POTASSIUM SERPL-SCNC: 3.5 MMOL/L (ref 3.5–5.3)
PR INTERVAL: 160 MS
PROT SERPL-MCNC: 7.7 G/DL (ref 6.4–8.2)
QRS AXIS: -7 DEGREES
QRSD INTERVAL: 86 MS
QT INTERVAL: 324 MS
QTC INTERVAL: 418 MS
RBC # BLD AUTO: 3.9 MILLION/UL (ref 3.81–5.12)
RSV RNA RESP QL NAA+PROBE: NEGATIVE
SARS-COV-2 RNA RESP QL NAA+PROBE: POSITIVE
SODIUM SERPL-SCNC: 139 MMOL/L (ref 136–145)
T WAVE AXIS: 40 DEGREES
VENTRICULAR RATE: 100 BPM
WBC # BLD AUTO: 5.16 THOUSAND/UL (ref 4.31–10.16)

## 2021-12-22 PROCEDURE — 71045 X-RAY EXAM CHEST 1 VIEW: CPT

## 2021-12-22 PROCEDURE — 93010 ELECTROCARDIOGRAM REPORT: CPT | Performed by: INTERNAL MEDICINE

## 2021-12-22 PROCEDURE — 0241U HB NFCT DS VIR RESP RNA 4 TRGT: CPT | Performed by: EMERGENCY MEDICINE

## 2021-12-22 PROCEDURE — 85025 COMPLETE CBC W/AUTO DIFF WBC: CPT | Performed by: EMERGENCY MEDICINE

## 2021-12-22 PROCEDURE — 36415 COLL VENOUS BLD VENIPUNCTURE: CPT

## 2021-12-22 PROCEDURE — 96374 THER/PROPH/DIAG INJ IV PUSH: CPT

## 2021-12-22 PROCEDURE — 85379 FIBRIN DEGRADATION QUANT: CPT

## 2021-12-22 PROCEDURE — 99285 EMERGENCY DEPT VISIT HI MDM: CPT | Performed by: EMERGENCY MEDICINE

## 2021-12-22 PROCEDURE — 99284 EMERGENCY DEPT VISIT MOD MDM: CPT

## 2021-12-22 PROCEDURE — 93005 ELECTROCARDIOGRAM TRACING: CPT

## 2021-12-22 PROCEDURE — 80053 COMPREHEN METABOLIC PANEL: CPT | Performed by: EMERGENCY MEDICINE

## 2021-12-22 PROCEDURE — 96375 TX/PRO/DX INJ NEW DRUG ADDON: CPT

## 2021-12-22 PROCEDURE — 84484 ASSAY OF TROPONIN QUANT: CPT

## 2021-12-22 RX ORDER — MELATONIN
1000 DAILY
Qty: 30 TABLET | Refills: 0 | Status: SHIPPED | OUTPATIENT
Start: 2021-12-22 | End: 2022-01-21

## 2021-12-22 RX ORDER — ONDANSETRON 4 MG/1
4 TABLET, ORALLY DISINTEGRATING ORAL ONCE
Status: COMPLETED | OUTPATIENT
Start: 2021-12-22 | End: 2021-12-22

## 2021-12-22 RX ORDER — ACETAMINOPHEN 325 MG/1
650 TABLET ORAL ONCE
Status: DISCONTINUED | OUTPATIENT
Start: 2021-12-22 | End: 2021-12-22 | Stop reason: HOSPADM

## 2021-12-22 RX ORDER — KETOROLAC TROMETHAMINE 30 MG/ML
15 INJECTION, SOLUTION INTRAMUSCULAR; INTRAVENOUS ONCE
Status: COMPLETED | OUTPATIENT
Start: 2021-12-22 | End: 2021-12-22

## 2021-12-22 RX ORDER — ONDANSETRON 2 MG/ML
4 INJECTION INTRAMUSCULAR; INTRAVENOUS ONCE
Status: COMPLETED | OUTPATIENT
Start: 2021-12-22 | End: 2021-12-22

## 2021-12-22 RX ADMIN — ONDANSETRON 4 MG: 4 TABLET, ORALLY DISINTEGRATING ORAL at 12:23

## 2021-12-22 RX ADMIN — KETOROLAC TROMETHAMINE 15 MG: 30 INJECTION, SOLUTION INTRAMUSCULAR at 14:36

## 2021-12-22 RX ADMIN — ONDANSETRON 4 MG: 2 INJECTION INTRAMUSCULAR; INTRAVENOUS at 14:37

## 2021-12-23 ENCOUNTER — TELEPHONE (OUTPATIENT)
Dept: FAMILY MEDICINE CLINIC | Facility: CLINIC | Age: 36
End: 2021-12-23

## 2022-01-07 ENCOUNTER — OFFICE VISIT (OUTPATIENT)
Dept: OBGYN CLINIC | Facility: CLINIC | Age: 37
End: 2022-01-07

## 2022-01-07 VITALS
SYSTOLIC BLOOD PRESSURE: 105 MMHG | HEART RATE: 79 BPM | WEIGHT: 184 LBS | BODY MASS INDEX: 33.65 KG/M2 | DIASTOLIC BLOOD PRESSURE: 68 MMHG

## 2022-01-07 DIAGNOSIS — Z11.3 SCREEN FOR STD (SEXUALLY TRANSMITTED DISEASE): ICD-10-CM

## 2022-01-07 DIAGNOSIS — B37.9 YEAST INFECTION: Primary | ICD-10-CM

## 2022-01-07 DIAGNOSIS — N90.89 VULVAR LESION: ICD-10-CM

## 2022-01-07 LAB
BV WHIFF TEST VAG QL: NEGATIVE
CLUE CELLS SPEC QL WET PREP: NEGATIVE
PH SMN: 4.5 [PH]
SL AMB POCT WET MOUNT: ABNORMAL
T VAGINALIS VAG QL WET PREP: NEGATIVE
YEAST VAG QL WET PREP: POSITIVE

## 2022-01-07 PROCEDURE — 99213 OFFICE O/P EST LOW 20 MIN: CPT | Performed by: NURSE PRACTITIONER

## 2022-01-07 PROCEDURE — 87491 CHLMYD TRACH DNA AMP PROBE: CPT | Performed by: NURSE PRACTITIONER

## 2022-01-07 PROCEDURE — 87210 SMEAR WET MOUNT SALINE/INK: CPT | Performed by: NURSE PRACTITIONER

## 2022-01-07 PROCEDURE — 87591 N.GONORRHOEAE DNA AMP PROB: CPT | Performed by: NURSE PRACTITIONER

## 2022-01-07 RX ORDER — FLUCONAZOLE 150 MG/1
150 TABLET ORAL ONCE
Qty: 1 TABLET | Refills: 1 | Status: SHIPPED | OUTPATIENT
Start: 2022-01-07 | End: 2022-01-07

## 2022-01-07 NOTE — PATIENT INSTRUCTIONS
Take Fluconazole as directed   Wear loose clothes and cotton underwear  Use warm compresses to area of vulvar cyst  STD results can take up to 2 weeks  Remember safe sex and condom use  Call with needs or concerns  Annual GYN exam is due after 5/17/2022    COVID-19 Instructions    If you are having any of the following:  Cough   Shortness of breath   Fever  If traveled within past 2 weeks internationally or to high risk [place] states  Or been in contact with someone that has     Please call either:   Your PCP office  [phone], option [phone]    They will screen you over the phone and direct you to the nearest appropriate testing location    DO NOT go to your PCP or OB office without calling first

## 2022-01-07 NOTE — PROGRESS NOTES
Assessment/Plan:     Diagnoses and all orders for this visit:    Yeast infection    Vulvar lesion  -     POCT wet mount  -     fluconazole (DIFLUCAN) 150 mg tablet; Take 1 tablet (150 mg total) by mouth once for 1 dose    Screen for STD (sexually transmitted disease)  -     Chlamydia/GC amplified DNA by PCR  -     HIV 1/2 Antigen/Antibody (4th Generation) w Reflex [facility]  -     Hepatitis B surface antigen; Future  -     Hepatitis C antibody; Future      Plan  Take Fluconazole as directed   Wear loose clothes and cotton underwear  Use warm compresses to area of vulvar cyst  STD results can take up to 2 weeks  Remember safe sex and condom use  Call with needs or concerns  Annual GYN exam is due after 5/17/2022  Pt verbalized understanding of all discussed   '      Subjective:      Patient ID: Michelle Hall is a 43 y o  female  HPI   Pt presents with concerns she has unusual vaginal discharge and a vulvar bump which gets larger at the time of her period and smaller when the period is gone x 1 month  Pt is also requesting for STD testing for peace of mind  Last WNL PAP 5/17/2021    Explained wet mount was positive for yeast, safe and effective use of Fluconazole was provided, discussed comfort measures  Explained "bump" is soft, does not appear to be infected, she should use warm compresses and if it does not resolve or becomes larger she should call the office  Advised to remember safe sex and condom use    The following portions of the patient's history were reviewed and updated as appropriate: allergies, current medications, past family history, past medical history, past social history, past surgical history and problem list     Review of Systems      Pertinent items are note in the HPI    Objective:      /68   Pulse 79   Wt 83 5 kg (184 lb)   LMP 12/09/2021 (Exact Date)   BMI 33 65 kg/m²          Physical Exam    Alert and oriented  Denies pain  WNL respiratory effort, negative cough or SOB  Vulva L negative lesions, slight erythema, negative erythea of R vulva  Below R vulva a 1 cm soft, tender mass was noted, negative erythema negative drainage noted  Vagina erythema, positive thick white discharge   Cervix positive thick white discharge, negative lesions  Wet mount positive for yeast

## 2022-01-07 NOTE — LETTER
2022    To Corky Leonard  : 1985      This letter is to advise you that your recent CULTURE for gonorrhea and chlamydia results were reviewed by me and are NORMAL  Please contact the office for an appointment if you have any additional concerns      JONATHON Avila

## 2022-01-10 LAB
C TRACH DNA SPEC QL NAA+PROBE: NEGATIVE
N GONORRHOEA DNA SPEC QL NAA+PROBE: NEGATIVE

## 2022-01-27 ENCOUNTER — OFFICE VISIT (OUTPATIENT)
Dept: FAMILY MEDICINE CLINIC | Facility: CLINIC | Age: 37
End: 2022-01-27

## 2022-01-27 ENCOUNTER — APPOINTMENT (OUTPATIENT)
Dept: LAB | Facility: CLINIC | Age: 37
End: 2022-01-27
Payer: MEDICARE

## 2022-01-27 VITALS
WEIGHT: 189 LBS | TEMPERATURE: 97.9 F | OXYGEN SATURATION: 98 % | HEART RATE: 72 BPM | SYSTOLIC BLOOD PRESSURE: 116 MMHG | DIASTOLIC BLOOD PRESSURE: 60 MMHG | BODY MASS INDEX: 34.57 KG/M2 | RESPIRATION RATE: 19 BRPM

## 2022-01-27 DIAGNOSIS — Z00.00 HEALTHCARE MAINTENANCE: ICD-10-CM

## 2022-01-27 DIAGNOSIS — Z11.3 ROUTINE SCREENING FOR STI (SEXUALLY TRANSMITTED INFECTION): ICD-10-CM

## 2022-01-27 DIAGNOSIS — Z11.3 ROUTINE SCREENING FOR STI (SEXUALLY TRANSMITTED INFECTION): Primary | ICD-10-CM

## 2022-01-27 DIAGNOSIS — Z11.3 SCREEN FOR STD (SEXUALLY TRANSMITTED DISEASE): ICD-10-CM

## 2022-01-27 LAB
ALBUMIN SERPL BCP-MCNC: 3.6 G/DL (ref 3.5–5)
ALP SERPL-CCNC: 56 U/L (ref 46–116)
ALT SERPL W P-5'-P-CCNC: 26 U/L (ref 12–78)
ANION GAP SERPL CALCULATED.3IONS-SCNC: 3 MMOL/L (ref 4–13)
AST SERPL W P-5'-P-CCNC: 21 U/L (ref 5–45)
BASOPHILS # BLD AUTO: 0.04 THOUSANDS/ΜL (ref 0–0.1)
BASOPHILS NFR BLD AUTO: 1 % (ref 0–1)
BILIRUB SERPL-MCNC: 0.26 MG/DL (ref 0.2–1)
BUN SERPL-MCNC: 8 MG/DL (ref 5–25)
CALCIUM SERPL-MCNC: 9.4 MG/DL (ref 8.3–10.1)
CHLORIDE SERPL-SCNC: 109 MMOL/L (ref 100–108)
CO2 SERPL-SCNC: 28 MMOL/L (ref 21–32)
CREAT SERPL-MCNC: 0.78 MG/DL (ref 0.6–1.3)
EOSINOPHIL # BLD AUTO: 0.2 THOUSAND/ΜL (ref 0–0.61)
EOSINOPHIL NFR BLD AUTO: 4 % (ref 0–6)
ERYTHROCYTE [DISTWIDTH] IN BLOOD BY AUTOMATED COUNT: 23.3 % (ref 11.6–15.1)
GFR SERPL CREATININE-BSD FRML MDRD: 96 ML/MIN/1.73SQ M
GLUCOSE P FAST SERPL-MCNC: 97 MG/DL (ref 65–99)
HBV SURFACE AG SER QL: NORMAL
HCT VFR BLD AUTO: 38.2 % (ref 34.8–46.1)
HCV AB SER QL: NORMAL
HGB BLD-MCNC: 11.1 G/DL (ref 11.5–15.4)
IMM GRANULOCYTES # BLD AUTO: 0.01 THOUSAND/UL (ref 0–0.2)
IMM GRANULOCYTES NFR BLD AUTO: 0 % (ref 0–2)
LYMPHOCYTES # BLD AUTO: 1.42 THOUSANDS/ΜL (ref 0.6–4.47)
LYMPHOCYTES NFR BLD AUTO: 28 % (ref 14–44)
MCH RBC QN AUTO: 23.3 PG (ref 26.8–34.3)
MCHC RBC AUTO-ENTMCNC: 29.1 G/DL (ref 31.4–37.4)
MCV RBC AUTO: 80 FL (ref 82–98)
MONOCYTES # BLD AUTO: 0.44 THOUSAND/ΜL (ref 0.17–1.22)
MONOCYTES NFR BLD AUTO: 9 % (ref 4–12)
NEUTROPHILS # BLD AUTO: 2.99 THOUSANDS/ΜL (ref 1.85–7.62)
NEUTS SEG NFR BLD AUTO: 58 % (ref 43–75)
NRBC BLD AUTO-RTO: 0 /100 WBCS
PLATELET # BLD AUTO: 302 THOUSANDS/UL (ref 149–390)
PMV BLD AUTO: 11 FL (ref 8.9–12.7)
POTASSIUM SERPL-SCNC: 3.7 MMOL/L (ref 3.5–5.3)
PROT SERPL-MCNC: 7.6 G/DL (ref 6.4–8.2)
RBC # BLD AUTO: 4.77 MILLION/UL (ref 3.81–5.12)
SODIUM SERPL-SCNC: 140 MMOL/L (ref 136–145)
TSH SERPL DL<=0.05 MIU/L-ACNC: 2.64 UIU/ML (ref 0.36–3.74)
WBC # BLD AUTO: 5.1 THOUSAND/UL (ref 4.31–10.16)

## 2022-01-27 PROCEDURE — 87491 CHLMYD TRACH DNA AMP PROBE: CPT

## 2022-01-27 PROCEDURE — 80053 COMPREHEN METABOLIC PANEL: CPT

## 2022-01-27 PROCEDURE — 36415 COLL VENOUS BLD VENIPUNCTURE: CPT

## 2022-01-27 PROCEDURE — 85025 COMPLETE CBC W/AUTO DIFF WBC: CPT

## 2022-01-27 PROCEDURE — 99213 OFFICE O/P EST LOW 20 MIN: CPT | Performed by: PHYSICIAN ASSISTANT

## 2022-01-27 PROCEDURE — 86695 HERPES SIMPLEX TYPE 1 TEST: CPT

## 2022-01-27 PROCEDURE — 84443 ASSAY THYROID STIM HORMONE: CPT

## 2022-01-27 PROCEDURE — 86592 SYPHILIS TEST NON-TREP QUAL: CPT

## 2022-01-27 PROCEDURE — 87521 HEPATITIS C PROBE&RVRS TRNSC: CPT

## 2022-01-27 PROCEDURE — 86803 HEPATITIS C AB TEST: CPT

## 2022-01-27 PROCEDURE — 87591 N.GONORRHOEAE DNA AMP PROB: CPT

## 2022-01-27 PROCEDURE — 87340 HEPATITIS B SURFACE AG IA: CPT

## 2022-01-27 PROCEDURE — 86696 HERPES SIMPLEX TYPE 2 TEST: CPT

## 2022-01-27 PROCEDURE — 83036 HEMOGLOBIN GLYCOSYLATED A1C: CPT

## 2022-01-27 PROCEDURE — 87389 HIV-1 AG W/HIV-1&-2 AB AG IA: CPT

## 2022-01-27 NOTE — PROGRESS NOTES
Assessment/Plan:    STI screening  - Patient attempted to donate plasma, but was unable to because her hepatitis-C screening came back positive, confirmatory test was negative  - Will recheck hep C antibody and HCV RNA   - Patient is requesting additional STI screening including herpes  Will place orders today  Diagnoses and all orders for this visit:    Routine screening for STI (sexually transmitted infection)  -     Chlamydia/GC amplified DNA by PCR; Future  -     HIV 1/2 Antigen/Antibody (4th Generation) w Reflex [facility]; Future  -     Hepatitis C antibody; Future  -     RPR; Future  -     Hepatitis C Virus (HCV) RNA, Qualitative, SHADIA With Reflex to Quantitative PCR; Future  -     Herpes I/II IgG LOLA w Reflex to HSV-2; Future    Healthcare maintenance  -     CBC and differential; Future  -     Comprehensive metabolic panel; Future  -     Lipid panel; Future  -     TSH, 3rd generation with Free T4 reflex; Future  -     Hemoglobin A1C; Future          All of patients questions were answered  Patient understands and agrees with the above plan  Return as needed  Candance Gamble, PA-C  01/27/22  [facility]          Subjective:     Patient ID: Lidia Vick  is a 43 y o  female with no known PHM who presents today in office for STD screening      - Patient is a 43 y o  female who presents today for STD screening  Patient notes she went to donate plasma, but she was unable to because she was told her hepatitis-C screening test came back positive  Patient notes the confirmatory test came back negative, but she was told to follow-up with her PCP  Patient denies any known personal history of hepatitis-C  Patient notes she does have over 20 tattoos  Patient notes currently she is not sexually active  Overall, patient notes she is feeling fine and denies any physical complaints today        The following portions of the patient's history were reviewed and updated as appropriate: allergies, current medications, past family history, past medical history, past social history, past surgical history and problem list         Review of Systems   Constitutional: Negative for chills, fatigue and fever  HENT: Negative for congestion and sore throat  Respiratory: Negative for cough, chest tightness and shortness of breath  Cardiovascular: Negative for chest pain and palpitations  Gastrointestinal: Negative for abdominal pain, constipation, diarrhea, nausea and vomiting  Genitourinary: Negative for difficulty urinating and dysuria  Neurological: Negative for dizziness and headaches  Objective:   Vitals:    01/27/22 1407   BP: 116/60   BP Location: Left arm   Patient Position: Sitting   Cuff Size: Standard   Pulse: 72   Resp: 19   Temp: 97 9 °F (36 6 °C)   TempSrc: Temporal   SpO2: 98%   Weight: 85 7 kg (189 lb)         Physical Exam  Vitals and nursing note reviewed  Constitutional:       General: She is not in acute distress  Appearance: She is well-developed  HENT:      Head: Normocephalic and atraumatic  Right Ear: External ear normal       Left Ear: External ear normal       Nose: Nose normal    Eyes:      Conjunctiva/sclera: Conjunctivae normal    Cardiovascular:      Rate and Rhythm: Normal rate and regular rhythm  Pulses: Normal pulses  Heart sounds: Normal heart sounds  Pulmonary:      Effort: Pulmonary effort is normal  No respiratory distress  Breath sounds: Normal breath sounds  No wheezing  Musculoskeletal:      Cervical back: Normal range of motion and neck supple  Skin:     General: Skin is warm and dry  Neurological:      Mental Status: She is alert and oriented to person, place, and time     Psychiatric:         Behavior: Behavior normal

## 2022-01-27 NOTE — LETTER
January 27, 2022     Patient: Darius Dumont   YOB: 1985   Date of Visit: 1/27/2022       To Whom it May Concern:    Nano Martinez is under my professional care  She was seen in my office on 1/27/2022  Due to Gnostic[employer] reasons, patient cannot receive the COVID-19 vaccine  If you have any questions or concerns, please don't hesitate to call           Sincerely,          Monika Gage PA-C        CC: No Recipients

## 2022-01-28 LAB
C TRACH DNA SPEC QL NAA+PROBE: NEGATIVE
EST. AVERAGE GLUCOSE BLD GHB EST-MCNC: 108 MG/DL
HBA1C MFR BLD: 5.4 %
HIV 1+2 AB+HIV1 P24 AG SERPL QL IA: NORMAL
HSV1 IGG SER IA-ACNC: 49.3 INDEX (ref 0–0.9)
HSV2 IGG SER IA-ACNC: <0.91 INDEX (ref 0–0.9)
N GONORRHOEA DNA SPEC QL NAA+PROBE: NEGATIVE
RPR SER QL: NORMAL

## 2022-01-31 LAB — MISCELLANEOUS LAB TEST RESULT: NORMAL

## 2022-02-03 ENCOUNTER — TELEPHONE (OUTPATIENT)
Dept: OBGYN CLINIC | Facility: CLINIC | Age: 37
End: 2022-02-03

## 2022-05-27 ENCOUNTER — HOSPITAL ENCOUNTER (EMERGENCY)
Facility: HOSPITAL | Age: 37
Discharge: HOME | End: 2022-05-27
Attending: EMERGENCY MEDICINE
Payer: MEDICARE

## 2022-05-27 ENCOUNTER — APPOINTMENT (EMERGENCY)
Dept: RADIOLOGY | Facility: HOSPITAL | Age: 37
End: 2022-05-27
Payer: MEDICARE

## 2022-05-27 VITALS
RESPIRATION RATE: 18 BRPM | TEMPERATURE: 95.9 F | WEIGHT: 189 LBS | BODY MASS INDEX: 34.78 KG/M2 | SYSTOLIC BLOOD PRESSURE: 127 MMHG | HEIGHT: 62 IN | DIASTOLIC BLOOD PRESSURE: 86 MMHG | OXYGEN SATURATION: 100 % | HEART RATE: 74 BPM

## 2022-05-27 DIAGNOSIS — M25.551 RIGHT HIP PAIN: Primary | ICD-10-CM

## 2022-05-27 PROCEDURE — 99283 EMERGENCY DEPT VISIT LOW MDM: CPT

## 2022-05-27 PROCEDURE — 73502 X-RAY EXAM HIP UNI 2-3 VIEWS: CPT

## 2022-05-27 PROCEDURE — 99284 EMERGENCY DEPT VISIT MOD MDM: CPT | Performed by: PHYSICIAN ASSISTANT

## 2022-05-27 PROCEDURE — 96372 THER/PROPH/DIAG INJ SC/IM: CPT

## 2022-05-27 RX ORDER — KETOROLAC TROMETHAMINE 30 MG/ML
30 INJECTION, SOLUTION INTRAMUSCULAR; INTRAVENOUS ONCE
Status: COMPLETED | OUTPATIENT
Start: 2022-05-27 | End: 2022-05-27

## 2022-05-27 RX ORDER — METHOCARBAMOL 750 MG/1
750 TABLET, FILM COATED ORAL 3 TIMES DAILY
Qty: 9 TABLET | Refills: 0 | Status: SHIPPED | OUTPATIENT
Start: 2022-05-27 | End: 2022-05-30

## 2022-05-27 RX ORDER — NAPROXEN 500 MG/1
500 TABLET ORAL 2 TIMES DAILY WITH MEALS
Qty: 14 TABLET | Refills: 0 | Status: SHIPPED | OUTPATIENT
Start: 2022-05-27 | End: 2022-06-03

## 2022-05-27 RX ADMIN — KETOROLAC TROMETHAMINE 30 MG: 30 INJECTION, SOLUTION INTRAMUSCULAR at 13:25

## 2022-05-27 NOTE — Clinical Note
Rasta Dickey was seen and treated in our emergency department on 5/27/2022  No restrictions            Diagnosis:     Marvin Kirby  may return to work on return date  She may return on this date: 05/27/2022         If you have any questions or concerns, please don't hesitate to call        Cherie Abdi PA-C    ______________________________           _______________          _______________  Hospital Representative                              Date                                Time

## 2022-05-27 NOTE — ED PROVIDER NOTES
History  Chief Complaint   Patient presents with    Hip Pain     Right hip pain that radiates down to right knee for about 4 days  Denies injury  33-year-old female presents to emergency room for evaluation of right hip pain  Onset about 4 days ago  Denies injury  States she initially thought maybe she slept on it wrong  Pain worse with movement  Able to walk with pain  Denies back pain  Denies buttock pain  Taking Tylenol without relief  Today pain started to radiate down the back of her leg and into her knee  Denies previous injury or surgeries with this leg  Denies fever or rash  History provided by:  Patient  Hip Pain  Associated symptoms: no abdominal pain, no chest pain, no fever, no rash and no shortness of breath        Prior to Admission Medications   Prescriptions Last Dose Informant Patient Reported? Taking?    cholecalciferol (VITAMIN D3) 1,000 units tablet   No No   Sig: Take 1 tablet (1,000 Units total) by mouth daily   ibuprofen (MOTRIN) 600 mg tablet   No No   Sig: Take 1 tablet (600 mg total) by mouth every 6 (six) hours as needed for mild pain or moderate pain   lidocaine (LIDODERM) 5 %   No No   Sig: Apply 1 patch topically daily Remove & Discard patch within 12 hours or as directed by MD   methocarbamol (ROBAXIN) 500 mg tablet   No No   Sig: Take 1 tablet (500 mg total) by mouth 2 (two) times a day      Facility-Administered Medications: None       Past Medical History:   Diagnosis Date    Abnormal Pap smear of cervix      colpo WNL;     Anxiety     no meds since 2018    Gonorrhea 2002    Heart problem 2017    swelling of L side of heart - hospitalized x7 days @[place], unsure of specifics, no issues thereafter       Past Surgical History:   Procedure Laterality Date     SECTION      TUBAL LIGATION         Family History   Problem Relation Age of Onset    Hypertension Mother     Hypertension Father     Cancer Maternal Uncle         throat    Breast cancer Neg Hx     Colon cancer Neg Hx     Ovarian cancer Neg Hx      I have reviewed and agree with the history as documented  E-Cigarette/Vaping    E-Cigarette Use Never User      E-Cigarette/Vaping Substances    Nicotine No     THC No     CBD No     Flavoring No     Other No     Unknown No      Social History     Tobacco Use    Smoking status: Former Smoker     Types: Cigars, Cigarettes     Quit date:      Years since quittin 4    Smokeless tobacco: Never Used   Vaping Use    Vaping Use: Never used   Substance Use Topics    Alcohol use: Yes    Drug use: Yes     Types: Marijuana     Comment: daily marijuana use       Review of Systems   Constitutional: Negative for chills and fever  Respiratory: Negative for shortness of breath  Cardiovascular: Negative for chest pain  Gastrointestinal: Negative for abdominal pain  Musculoskeletal: Negative for back pain  Right hip pain   Skin: Negative for rash and wound  Neurological: Negative for weakness and numbness  Physical Exam  Physical Exam  Vitals and nursing note reviewed  Constitutional:       Appearance: Normal appearance  She is well-developed  HENT:      Head: Atraumatic  Eyes:      Conjunctiva/sclera: Conjunctivae normal    Cardiovascular:      Rate and Rhythm: Normal rate and regular rhythm  Pulses: Normal pulses  Pulmonary:      Effort: No respiratory distress  Breath sounds: Normal breath sounds  Musculoskeletal:      Lumbar back: Normal  No tenderness or bony tenderness  Negative right straight leg raise test and negative left straight leg raise test       Right hip: Tenderness and bony tenderness present  No deformity, lacerations or crepitus  Decreased range of motion  Normal strength  Left hip: Normal       Comments: No foot drop  Ambulates with a limp   Skin:     General: Skin is warm and dry  Capillary Refill: Capillary refill takes less than 2 seconds     Neurological: Mental Status: She is alert  Psychiatric:         Mood and Affect: Mood normal          Vital Signs  ED Triage Vitals [05/27/22 1136]   Temperature Pulse Respirations Blood Pressure SpO2   (!) 95 9 °F (35 5 °C) 74 18 127/86 100 %      Temp Source Heart Rate Source Patient Position - Orthostatic VS BP Location FiO2 (%)   Temporal Monitor Sitting Right arm --      Pain Score       6           Vitals:    05/27/22 1136   BP: 127/86   Pulse: 74   Patient Position - Orthostatic VS: Sitting         Visual Acuity      ED Medications  Medications   ketorolac (TORADOL) injection 30 mg (30 mg Intramuscular Given 5/27/22 1325)       Diagnostic Studies  Results Reviewed     None                 XR hip/pelv 2-3 vws right   ED Interpretation by Lawanda Ewing PA-C (05/27 1403)   NAD                 Procedures  Procedures         ED Course                                             MDM  Number of Diagnoses or Management Options     Amount and/or Complexity of Data Reviewed  Tests in the radiology section of CPT®: ordered and reviewed    Risk of Complications, Morbidity, and/or Mortality  General comments: Differential diagnosis includes but is not limited to: sciatica, bursitis, OA, fx, bone lesion, muscle strain    Patient Progress  Patient progress: stable      Disposition  Final diagnoses:   Right hip pain     Time reflects when diagnosis was documented in both MDM as applicable and the Disposition within this note     Time User Action Codes Description Comment    5/27/2022  1:42 PM Alyx Esquivel Add [Y50 [id number]] Right hip pain       ED Disposition     ED Disposition   Discharge    Condition   Stable    Date/Time   Fri May 27, 2022  1:42 PM    Daniel Sadler discharge to home/self care                 Follow-up Information     Follow up With Specialties Details Why Contact Info Additional Information    [address] Orthopedic Surgery In 3 days  [address] [facility],[address] Floor  [address], [address] [place] [address], [place], [place], [address]  Use Entrance A     [address] [facility] Emergency [facility] Emergency Medicine  If symptoms worsen [address]  [address] [facility] [address] Emergency Department, [address], [place], [place], [address]   [phone]          Patient's Medications   Discharge Prescriptions    METHOCARBAMOL (ROBAXIN) 750 MG TABLET    Take 1 tablet (750 mg total) by mouth 3 (three) times a day for 3 days       Start Date: 5/27/2022 End Date: 5/30/2022       Order Dose: 750 mg       Quantity: 9 tablet    Refills: 0    NAPROXEN (NAPROSYN) 500 MG TABLET    Take 1 tablet (500 mg total) by mouth 2 (two) times a day with meals for 7 days       Start Date: 5/27/2022 End Date: 6/3/2022       Order Dose: 500 mg       Quantity: 14 tablet    Refills: 0       No discharge procedures on file      PDMP Review     None          ED Provider  Electronically Signed by           Angus Novak PA-C  05/27/22 1507

## 2022-07-14 ENCOUNTER — OFFICE VISIT (OUTPATIENT)
Dept: OBGYN CLINIC | Facility: CLINIC | Age: 37
End: 2022-07-14

## 2022-07-14 VITALS
SYSTOLIC BLOOD PRESSURE: 115 MMHG | HEIGHT: 63 IN | WEIGHT: 185.4 LBS | HEART RATE: 85 BPM | BODY MASS INDEX: 32.85 KG/M2 | DIASTOLIC BLOOD PRESSURE: 75 MMHG

## 2022-07-14 DIAGNOSIS — A63.0 GENITAL WARTS: Primary | ICD-10-CM

## 2022-07-14 PROCEDURE — 99213 OFFICE O/P EST LOW 20 MIN: CPT | Performed by: OBSTETRICS & GYNECOLOGY

## 2022-07-14 NOTE — PATIENT INSTRUCTIONS
Thank you for your confidence in our team    We appreciate you and welcome your feedback  If you receive a survey from us, please take a few moments to let us know how we are doing     Sincerely,  Sammy Adnrade, DO

## 2022-07-14 NOTE — PROGRESS NOTES
PROBLEM GYNECOLOGICAL VISIT    Junior Morgan is a 43 y o  female who presents today with complaint of vaginal bumps  Her general medical history has been reviewed and she reports it as follows:  Patient states was seen earlier this year with the complaint of vaginal bumps was told to use hot compress which did not resolve the issue  Past Medical History:   Diagnosis Date    Abnormal Pap smear of cervix      colpo WNL;     Anxiety     no meds since 2018    Gonorrhea     Heart problem 2017    swelling of L side of heart - hospitalized x7 days @[place], unsure of specifics, no issues thereafter     Past Surgical History:   Procedure Laterality Date     SECTION      TUBAL LIGATION       OB History        5    Para   3    Term   3       0    AB   2    Living   3       SAB   0    IAB   2    Ectopic   0    Multiple   0    Live Births   3               Social History     Tobacco Use    Smoking status: Former Smoker     Types: Cigars, Cigarettes     Quit date:      Years since quittin 5    Smokeless tobacco: Never Used   Vaping Use    Vaping Use: Never used   Substance Use Topics    Alcohol use:  Yes    Drug use: Yes     Types: Marijuana     Comment: daily marijuana use     Social History     Substance and Sexual Activity   Sexual Activity Yes    Partners: Male    Birth control/protection: Female Sterilization       Current Outpatient Medications   Medication Instructions    cholecalciferol (VITAMIN D3) 1,000 Units, Oral, Daily    ibuprofen (MOTRIN) 600 mg, Oral, Every 6 hours PRN    lidocaine (LIDODERM) 5 % 1 patch, Topical, Daily, Remove & Discard patch within 12 hours or as directed by MD    methocarbamol (ROBAXIN) 500 mg, Oral, 2 times daily    methocarbamol (ROBAXIN) 750 mg, Oral, 3 times daily    naproxen (NAPROSYN) 500 mg, Oral, 2 times daily with meals       History of Present Illness:   Vaginal bumps    Review of Systems:  Review of Systems   Genitourinary:        Vaginal bumps   All other systems reviewed and are negative  Physical Exam:  /75   Pulse 85   Ht 5' 3" (1 6 m)   Wt 84 1 kg (185 lb 6 4 oz)   LMP 06/19/2022 (Exact Date)   BMI 32 84 kg/m²   Physical Exam  Constitutional:       Appearance: Normal appearance  Genitourinary:      Vulva exam comments: Condylomas noted at introitus to the anal area  Neurological:      Mental Status: She is alert  Assessment:   1  Genital warts    Plan:   1  Recommend excision of the lesions   2  Return to office 1wk  Reviewed with patient that test results are available in MyChart immediately, but that they will not necessarily be reviewed by me immediately  Explained that I will review results at my earliest opportunity and contact patient appropriately

## 2022-07-22 ENCOUNTER — PROCEDURE VISIT (OUTPATIENT)
Dept: OBGYN CLINIC | Facility: CLINIC | Age: 37
End: 2022-07-22

## 2022-07-22 VITALS
BODY MASS INDEX: 32.11 KG/M2 | DIASTOLIC BLOOD PRESSURE: 92 MMHG | WEIGHT: 181.2 LBS | HEART RATE: 77 BPM | SYSTOLIC BLOOD PRESSURE: 130 MMHG | HEIGHT: 63 IN

## 2022-07-22 DIAGNOSIS — A63.0 GENITAL WARTS: Primary | ICD-10-CM

## 2022-07-22 PROCEDURE — 88342 IMHCHEM/IMCYTCHM 1ST ANTB: CPT | Performed by: STUDENT IN AN ORGANIZED HEALTH CARE EDUCATION/TRAINING PROGRAM

## 2022-07-22 PROCEDURE — 88341 IMHCHEM/IMCYTCHM EA ADD ANTB: CPT | Performed by: STUDENT IN AN ORGANIZED HEALTH CARE EDUCATION/TRAINING PROGRAM

## 2022-07-22 PROCEDURE — 88305 TISSUE EXAM BY PATHOLOGIST: CPT | Performed by: STUDENT IN AN ORGANIZED HEALTH CARE EDUCATION/TRAINING PROGRAM

## 2022-07-22 RX ORDER — LIDOCAINE 40 MG/G
CREAM TOPICAL AS NEEDED
Qty: 30 G | Refills: 0 | Status: SHIPPED | OUTPATIENT
Start: 2022-07-22

## 2022-07-22 NOTE — PROGRESS NOTES
PROCEDURE: Excision of warts 3 in total with 1 mm margins  Lesions displays no secondary symptoms  Location: perineum/perianal  Lesion Size: 1mm each x3    Informed Consent: The indications, risks, benefits and alternatives to the procedure, including no procedure, were discussed in detail  Risks of the procedure were described and verbal/signed consent was obtained  The surgical site was prepped with betadine  Sterile technique was used throughout the procedure  Anesthesia was obtained using 2% lidocaine  Three elipticle (shape) excisions was made and a 1 mm margin surrounding the lesions using an 11 blade  The 3 condylomas  was removed  The surgical sites was closed using 4-0 vicryl  The procedure was well tolerated without complications  Blood loss was minimal  Good hemostasis was noted  The specimens were submitted to pathology  The patient was educated about signs of infection, and wound care instructions were reviewed  The patient should return 2weeks

## 2022-07-22 NOTE — PATIENT INSTRUCTIONS
Thank you for your confidence in our team    We appreciate you and welcome your feedback  If you receive a survey from us, please take a few moments to let us know how we are doing     Sincerely,  Jcarlos Ch, DO

## 2022-11-22 ENCOUNTER — APPOINTMENT (EMERGENCY)
Dept: RADIOLOGY | Facility: HOSPITAL | Age: 37
End: 2022-11-22

## 2022-11-22 ENCOUNTER — HOSPITAL ENCOUNTER (EMERGENCY)
Facility: HOSPITAL | Age: 37
Discharge: HOME | End: 2022-11-22
Attending: EMERGENCY MEDICINE

## 2022-11-22 VITALS
OXYGEN SATURATION: 96 % | WEIGHT: 185.5 LBS | RESPIRATION RATE: 16 BRPM | HEART RATE: 76 BPM | DIASTOLIC BLOOD PRESSURE: 59 MMHG | BODY MASS INDEX: 32.86 KG/M2 | SYSTOLIC BLOOD PRESSURE: 109 MMHG | TEMPERATURE: 97.5 F

## 2022-11-22 DIAGNOSIS — M54.12 CERVICAL RADICULOPATHY: Primary | ICD-10-CM

## 2022-11-22 LAB
ALBUMIN SERPL BCP-MCNC: 4 G/DL (ref 3.5–5)
ALP SERPL-CCNC: 54 U/L (ref 43–122)
ALT SERPL W P-5'-P-CCNC: 18 U/L
ANION GAP SERPL CALCULATED.3IONS-SCNC: 8 MMOL/L (ref 5–14)
AST SERPL W P-5'-P-CCNC: 23 U/L (ref 14–36)
ATRIAL RATE: 79 BPM
BASOPHILS # BLD AUTO: 0.04 THOUSANDS/ÂΜL (ref 0–0.1)
BASOPHILS NFR BLD AUTO: 1 % (ref 0–1)
BILIRUB SERPL-MCNC: 0.24 MG/DL (ref 0.2–1)
BUN SERPL-MCNC: 10 MG/DL (ref 5–25)
CALCIUM SERPL-MCNC: 8.7 MG/DL (ref 8.4–10.2)
CARDIAC TROPONIN I PNL SERPL HS: <2 NG/L
CHLORIDE SERPL-SCNC: 103 MMOL/L (ref 96–108)
CO2 SERPL-SCNC: 26 MMOL/L (ref 21–32)
CREAT SERPL-MCNC: 0.79 MG/DL (ref 0.6–1.2)
EOSINOPHIL # BLD AUTO: 0.13 THOUSAND/ÂΜL (ref 0–0.61)
EOSINOPHIL NFR BLD AUTO: 2 % (ref 0–6)
ERYTHROCYTE [DISTWIDTH] IN BLOOD BY AUTOMATED COUNT: 18.4 % (ref 11.6–15.1)
GFR SERPL CREATININE-BSD FRML MDRD: 94 ML/MIN/1.73SQ M
GLUCOSE SERPL-MCNC: 129 MG/DL (ref 70–99)
HCT VFR BLD AUTO: 28 % (ref 34.8–46.1)
HGB BLD-MCNC: 8.1 G/DL (ref 11.5–15.4)
IMM GRANULOCYTES # BLD AUTO: 0.02 THOUSAND/UL (ref 0–0.2)
IMM GRANULOCYTES NFR BLD AUTO: 0 % (ref 0–2)
LYMPHOCYTES # BLD AUTO: 2.02 THOUSANDS/ÂΜL (ref 0.6–4.47)
LYMPHOCYTES NFR BLD AUTO: 31 % (ref 14–44)
MCH RBC QN AUTO: 21.8 PG (ref 26.8–34.3)
MCHC RBC AUTO-ENTMCNC: 28.9 G/DL (ref 31.4–37.4)
MCV RBC AUTO: 76 FL (ref 82–98)
MONOCYTES # BLD AUTO: 0.46 THOUSAND/ÂΜL (ref 0.17–1.22)
MONOCYTES NFR BLD AUTO: 7 % (ref 4–12)
NEUTROPHILS # BLD AUTO: 3.9 THOUSANDS/ÂΜL (ref 1.85–7.62)
NEUTS SEG NFR BLD AUTO: 59 % (ref 43–75)
NRBC BLD AUTO-RTO: 0 /100 WBCS
P AXIS: 36 DEGREES
PLATELET # BLD AUTO: 361 THOUSANDS/UL (ref 149–390)
PMV BLD AUTO: 9 FL (ref 8.9–12.7)
POTASSIUM SERPL-SCNC: 3.3 MMOL/L (ref 3.5–5.3)
PR INTERVAL: 192 MS
PROT SERPL-MCNC: 7.4 G/DL (ref 6.4–8.4)
QRS AXIS: -1 DEGREES
QRSD INTERVAL: 94 MS
QT INTERVAL: 378 MS
QTC INTERVAL: 433 MS
RBC # BLD AUTO: 3.71 MILLION/UL (ref 3.81–5.12)
SODIUM SERPL-SCNC: 137 MMOL/L (ref 135–147)
T WAVE AXIS: 24 DEGREES
VENTRICULAR RATE: 79 BPM
WBC # BLD AUTO: 6.57 THOUSAND/UL (ref 4.31–10.16)

## 2022-11-22 RX ORDER — KETOROLAC TROMETHAMINE 30 MG/ML
15 INJECTION, SOLUTION INTRAMUSCULAR; INTRAVENOUS ONCE
Status: COMPLETED | OUTPATIENT
Start: 2022-11-22 | End: 2022-11-22

## 2022-11-22 RX ORDER — LIDOCAINE 50 MG/G
1 PATCH TOPICAL DAILY
Qty: 6 PATCH | Refills: 0 | Status: SHIPPED | OUTPATIENT
Start: 2022-11-22

## 2022-11-22 RX ORDER — NAPROXEN 500 MG/1
500 TABLET ORAL 2 TIMES DAILY WITH MEALS
Qty: 30 TABLET | Refills: 0 | Status: SHIPPED | OUTPATIENT
Start: 2022-11-22

## 2022-11-22 RX ADMIN — KETOROLAC TROMETHAMINE 15 MG: 30 INJECTION, SOLUTION INTRAMUSCULAR; INTRAVENOUS at 03:23

## 2022-11-22 NOTE — ED PROVIDER NOTES
History  Chief Complaint   Patient presents with   • Chest Pain     Reports chest pain since last evening with intermittent tingling of her fingers on her left hand for the past 2 days  Patient reports left arm pain and intermittent tingling of left arm with her chest pain     52-year-old female without significant past medical history presents complaining of chest pain radiating down left arm has been intermittent for the past 3 days  Patient states that she occasionally gets a twinge of pain in her chest and then feels a sharp shock-like sensation down her left arm  Denies any neck pain injury or trauma  Denies any painful breathing, leg swelling or recent travel  Denies any other complaints       History provided by:  Patient   used: No        Prior to Admission Medications   Prescriptions Last Dose Informant Patient Reported? Taking?    cholecalciferol (VITAMIN D3) 1,000 units tablet   No No   Sig: Take 1 tablet (1,000 Units total) by mouth daily   ibuprofen (MOTRIN) 600 mg tablet   No No   Sig: Take 1 tablet (600 mg total) by mouth every 6 (six) hours as needed for mild pain or moderate pain   lidocaine (LMX) 4 % cream Not Taking  No No   Sig: Apply topically as needed for mild pain   Patient not taking: Reported on 11/22/2022   methocarbamol (ROBAXIN) 500 mg tablet   No No   Sig: Take 1 tablet (500 mg total) by mouth 2 (two) times a day   Patient not taking: No sig reported   methocarbamol (ROBAXIN) 750 mg tablet   No No   Sig: Take 1 tablet (750 mg total) by mouth 3 (three) times a day for 3 days   naproxen (NAPROSYN) 500 mg tablet   No No   Sig: Take 1 tablet (500 mg total) by mouth 2 (two) times a day with meals for 7 days      Facility-Administered Medications: None       Past Medical History:   Diagnosis Date   • Abnormal Pap smear of cervix     2002 colpo WNL;    • Anxiety     no meds since 2018   • Gonorrhea 2002   • Heart problem 2017    swelling of L side of heart - hospitalized x7 days @[place], unsure of specifics, no issues thereafter       Past Surgical History:   Procedure Laterality Date   •  SECTION     • TUBAL LIGATION         Family History   Problem Relation Age of Onset   • Hypertension Mother    • Hypertension Father    • Cancer Maternal Uncle         throat   • Breast cancer Neg Hx    • Colon cancer Neg Hx    • Ovarian cancer Neg Hx      I have reviewed and agree with the history as documented  E-Cigarette/Vaping   • E-Cigarette Use Never User      E-Cigarette/Vaping Substances   • Nicotine No    • THC No    • CBD No    • Flavoring No    • Other No    • Unknown No      Social History     Tobacco Use   • Smoking status: Former     Types: Cigars, Cigarettes     Quit date:      Years since quittin 8   • Smokeless tobacco: Never   • Tobacco comments:     Uses hooka now   Vaping Use   • Vaping Use: Never used   Substance Use Topics   • Alcohol use: Yes     Comment: socially   • Drug use: Yes     Types: Marijuana     Comment: daily marijuana use       Review of Systems   Constitutional: Negative  Negative for chills and fatigue  HENT: Negative for ear pain and sore throat  Eyes: Negative for photophobia and redness  Respiratory: Negative for apnea, cough and shortness of breath  Cardiovascular: Negative for chest pain  Gastrointestinal: Negative for abdominal pain, nausea and vomiting  Genitourinary: Negative for dysuria  Musculoskeletal: Negative for arthralgias, neck pain and neck stiffness  Skin: Negative for rash  Neurological: Negative for dizziness, tremors, syncope and weakness  Psychiatric/Behavioral: Negative for suicidal ideas  Physical Exam  Physical Exam  Constitutional:       General: She is not in acute distress  Appearance: She is well-developed and well-nourished  She is not diaphoretic     HENT:      Mouth/Throat:      Mouth: Oropharynx is clear and moist    Eyes:      Extraocular Movements: EOM normal  Pupils: Pupils are equal, round, and reactive to light  Cardiovascular:      Rate and Rhythm: Normal rate and regular rhythm  Pulmonary:      Effort: Pulmonary effort is normal  No respiratory distress  Breath sounds: Normal breath sounds  Abdominal:      General: Bowel sounds are normal  There is no distension  Palpations: Abdomen is soft  Musculoskeletal:         General: Normal range of motion  Cervical back: Normal range of motion and neck supple  Skin:     General: Skin is warm and dry  Neurological:      General: No focal deficit present  Mental Status: She is alert and oriented to person, place, and time  Cranial Nerves: No cranial nerve deficit  Motor: No weakness        Gait: Gait normal    Psychiatric:         Mood and Affect: Mood and affect normal          Vital Signs  ED Triage Vitals   Temperature Pulse Respirations Blood Pressure SpO2   11/22/22 0244 11/22/22 0244 11/22/22 0244 11/22/22 0244 11/22/22 0244   97 5 °F (36 4 °C) 83 18 118/82 99 %      Temp Source Heart Rate Source Patient Position - Orthostatic VS BP Location FiO2 (%)   11/22/22 0244 11/22/22 0244 11/22/22 0244 11/22/22 0244 --   Oral Monitor Sitting Left arm       Pain Score       11/22/22 0323       8           Vitals:    11/22/22 0244   BP: 118/82   Pulse: 83   Patient Position - Orthostatic VS: Sitting         Visual Acuity      ED Medications  Medications   ketorolac (TORADOL) injection 15 mg (15 mg Intravenous Given 11/22/22 0323)       Diagnostic Studies  Results Reviewed     Procedure Component Value Units Date/Time    HS Troponin I 4hr [[id number]]     Lab Status: No result Specimen: Blood     HS Troponin 0hr (reflex protocol) [[id number]]  (Normal) Collected: 11/22/22 0323    Lab Status: Final result Specimen: Blood from Arm, Right Updated: 11/22/22 0356     hs TnI 0hr <2 ng/L     HS Troponin I 2hr [[id number]]     Lab Status: No result Specimen: Blood     Comprehensive metabolic panel [[id number]]  (Abnormal) Collected: 11/22/22 0319    Lab Status: Final result Specimen: Blood from Arm, Right Updated: 11/22/22 0347     Sodium 137 mmol/L      Potassium 3 3 mmol/L      Chloride 103 mmol/L      CO2 26 mmol/L      ANION GAP 8 mmol/L      BUN 10 mg/dL      Creatinine 0 79 mg/dL      Glucose 129 mg/dL      Calcium 8 7 mg/dL      AST 23 U/L      ALT 18 U/L      Alkaline Phosphatase 54 U/L      Total Protein 7 4 g/dL      Albumin 4 0 g/dL      Total Bilirubin 0 24 mg/dL      eGFR 94 ml/min/1 73sq m     Narrative:      National Kidney Disease Foundation guidelines for Chronic Kidney Disease (CKD):   •  Stage 1 with normal or high GFR (GFR > 90 mL/min/1 73 square meters)  •  Stage 2 Mild CKD (GFR = 60-89 mL/min/1 73 square meters)  •  Stage 3A Moderate CKD (GFR = 45-59 mL/min/1 73 square meters)  •  Stage 3B Moderate CKD (GFR = 30-44 mL/min/1 73 square meters)  •  Stage 4 Severe CKD (GFR = 15-29 mL/min/1 73 square meters)  •  Stage 5 End Stage CKD (GFR <15 mL/min/1 73 square meters)  Note: GFR calculation is accurate only with a steady state creatinine    CBC and differential [[id number]]  (Abnormal) Collected: 11/22/22 0319    Lab Status: Final result Specimen: Blood from Arm, Right Updated: 11/22/22 0331     WBC 6 57 Thousand/uL      RBC 3 71 Million/uL      Hemoglobin 8 1 g/dL      Hematocrit 28 0 %      MCV 76 fL      MCH 21 8 pg      MCHC 28 9 g/dL      RDW 18 4 %      MPV 9 0 fL      Platelets 702 Thousands/uL      nRBC 0 /100 WBCs      Neutrophils Relative 59 %      Immat GRANS % 0 %      Lymphocytes Relative 31 %      Monocytes Relative 7 %      Eosinophils Relative 2 %      Basophils Relative 1 %      Neutrophils Absolute 3 90 Thousands/µL      Immature Grans Absolute 0 02 Thousand/uL      Lymphocytes Absolute 2 02 Thousands/µL      Monocytes Absolute 0 46 Thousand/µL      Eosinophils Absolute 0 13 Thousand/µL      Basophils Absolute 0 04 Thousands/µL     UA (URINE) with reflex to Scope [[id number]] Lab Status: No result Specimen: Urine     POCT pregnancy, urine [[id number]]     Lab Status: No result                  XR chest 2 views   ED Interpretation by Jasmyne Biggs PA-C (11/22 0351)   No obvious focal consolidation                 Procedures  ECG 12 Lead Documentation Only    Date/Time: 11/22/2022 2:32 AM  Performed by: Jasmyne Biggs PA-C  Authorized by: Jasmyne Biggs PA-C     Indications / Diagnosis:  Chest pain   ECG reviewed by me, the ED Provider: yes    Patient location:  ED  Interpretation:     Interpretation: normal    Rate:     ECG rate:  79    ECG rate assessment: normal    Rhythm:     Rhythm: sinus rhythm    Ectopy:     Ectopy: none    QRS:     QRS axis:  Normal    QRS intervals:  Normal  Conduction:     Conduction: normal    ST segments:     ST segments:  Normal  T waves:     T waves: inverted      Inverted:  V1             ED Course             HEART Risk Score    Flowsheet Row Most Recent Value   Heart Score Risk Calculator    History 0 Filed at: 11/22/2022 0411   ECG 0 Filed at: 11/22/2022 0411   Age 0 Filed at: 11/22/2022 0411   Risk Factors 1 Filed at: 11/22/2022 0411   Troponin 0 Filed at: 11/22/2022 0411   HEART Score 1 Filed at: 11/22/2022 [facility]           Stroke Assessment     Row Name 11/22/22 0411             NIH Stroke Scale    Interval Baseline      Level of Consciousness (1a ) 0      LOC Questions (1b ) 0      LOC Commands (1c ) 0      Best Gaze (2 ) 0      Visual (3 ) 0      Facial Palsy (4 ) 0      Motor Arm, Left (5a ) 0      Motor Arm, Right (5b ) 0      Motor Leg, Left (6a ) 0      Motor Leg, Right (6b ) 0      Limb Ataxia (7 ) 0      Sensory (8 ) 0      Best Language (9 ) 0      Dysarthria (10 ) 0      Extinction and Inattention (11 ) (Formerly Neglect) 0      Total 0                    PERC Rule for PE    Flowsheet Row Most Recent Value   PERC Rule for PE    Age >=50 0 Filed at: 11/22/2022 0301   HR >=100 0 Filed at: 11/22/2022 0301   O2 Sat on room air < 95% 0 Filed at: 11/22/2022 0301   History of PE or DVT 0 Filed at: 11/22/2022 0301   Recent trauma or surgery 0 Filed at: 11/22/2022 0301   Hemoptysis 0 Filed at: 11/22/2022 0301   Exogenous estrogen 0 Filed at: 11/22/2022 0301   Unilateral leg swelling 0 Filed at: 11/22/2022 0301   PERC Rule for PE Results 0 Filed at: 11/22/2022 0301                            [facility]  Number of Diagnoses or Management Options  Cervical radiculopathy: new and does not require workup  Diagnosis management comments: Patient had complete resolution of symptoms with Toradol in the emergency department  Symptoms thought to be musculoskeletal in nature  Benign cardiac evaluation  Patient given orthopedic follow-up  Educated on supportive care and discharged home  PERC score 0  Amount and/or Complexity of Data Reviewed  Clinical lab tests: ordered and reviewed  Tests in the radiology section of CPT®: ordered and reviewed    Risk of Complications, Morbidity, and/or Mortality  Presenting problems: moderate  Diagnostic procedures: moderate  Management options: moderate    Patient Progress  Patient progress: stable      Disposition  Final diagnoses:   Cervical radiculopathy     Time reflects when diagnosis was documented in both MDM as applicable and the Disposition within this note     Time User Action Codes Description Comment    11/22/2022  4:26 AM Gia Garvin Add [M54 12] Cervical radiculopathy       ED Disposition     ED Disposition   Discharge    Condition   Stable    Date/Time   Tue Nov 22, 2022  4:25 AM    Daniel Rose discharge to home/self care                 Follow-up Information     Follow up With Specialties Details Why Contact Info Additional [address] [facility] Emergency Department Emergency Medicine Go to  If symptoms worsen [address] [id number] [address] [facility] Emergency Department    [place] [address] [facility] Orthopedic Surgery Schedule an appointment as soon as possible for a visit in 1 day  [address] [address]  [address] [place] [address], [address] [place] [address], [address] [place] [address], [phone]          Patient's Medications   Discharge Prescriptions    LIDOCAINE (LIDODERM) 5 %    Apply 1 patch topically daily Remove & Discard patch within 12 hours or as directed by MD       Start Date: 11/22/2022End Date: --       Order Dose: 1 patch       Quantity: 6 patch    Refills: 0    NAPROXEN (NAPROSYN) 500 MG TABLET    Take 1 tablet (500 mg total) by mouth 2 (two) times a day with meals       Start Date: 11/22/2022End Date: --       Order Dose: 500 mg       Quantity: 30 tablet    Refills: 0       No discharge procedures on file      PDMP Review     None          ED Provider  Electronically Signed by           Qian Mayo PA-C  11/22/22 5497

## 2022-11-22 NOTE — ED NOTES
Patient was sleeping when this nurse went in to give her the discharge instructions  She did say that she got a little nap       Chon Bray RN  11/22/22 4174

## 2022-12-14 ENCOUNTER — HOSPITAL ENCOUNTER (EMERGENCY)
Facility: HOSPITAL | Age: 37
Discharge: HOME | End: 2022-12-14
Attending: EMERGENCY MEDICINE

## 2022-12-14 VITALS
HEART RATE: 88 BPM | SYSTOLIC BLOOD PRESSURE: 125 MMHG | RESPIRATION RATE: 16 BRPM | DIASTOLIC BLOOD PRESSURE: 80 MMHG | TEMPERATURE: 98.3 F | WEIGHT: 187.17 LBS | BODY MASS INDEX: 33.16 KG/M2 | OXYGEN SATURATION: 100 %

## 2022-12-14 DIAGNOSIS — D64.9 ANEMIA: ICD-10-CM

## 2022-12-14 DIAGNOSIS — K52.9 GASTROENTERITIS: Primary | ICD-10-CM

## 2022-12-14 LAB
ALBUMIN SERPL BCP-MCNC: 4.2 G/DL (ref 3.5–5)
ALP SERPL-CCNC: 54 U/L (ref 43–122)
ALT SERPL W P-5'-P-CCNC: 12 U/L
ANION GAP SERPL CALCULATED.3IONS-SCNC: 5 MMOL/L (ref 5–14)
AST SERPL W P-5'-P-CCNC: 20 U/L (ref 14–36)
BACTERIA UR QL AUTO: ABNORMAL /HPF
BASOPHILS # BLD AUTO: 0.04 THOUSANDS/ÂΜL (ref 0–0.1)
BASOPHILS NFR BLD AUTO: 1 % (ref 0–1)
BILIRUB SERPL-MCNC: 0.32 MG/DL (ref 0.2–1)
BILIRUB UR QL STRIP: NEGATIVE
BUN SERPL-MCNC: 8 MG/DL (ref 5–25)
CALCIUM SERPL-MCNC: 8.8 MG/DL (ref 8.4–10.2)
CHLORIDE SERPL-SCNC: 107 MMOL/L (ref 96–108)
CLARITY UR: ABNORMAL
CO2 SERPL-SCNC: 28 MMOL/L (ref 21–32)
COLOR UR: ABNORMAL
CREAT SERPL-MCNC: 0.84 MG/DL (ref 0.6–1.2)
EOSINOPHIL # BLD AUTO: 0.18 THOUSAND/ÂΜL (ref 0–0.61)
EOSINOPHIL NFR BLD AUTO: 3 % (ref 0–6)
ERYTHROCYTE [DISTWIDTH] IN BLOOD BY AUTOMATED COUNT: 17.1 % (ref 11.6–15.1)
EXT PREGNANCY TEST URINE: NEGATIVE
EXT. CONTROL: NORMAL
GFR SERPL CREATININE-BSD FRML MDRD: 87 ML/MIN/1.73SQ M
GLUCOSE SERPL-MCNC: 103 MG/DL (ref 70–99)
GLUCOSE UR STRIP-MCNC: NEGATIVE MG/DL
HCT VFR BLD AUTO: 29.4 % (ref 34.8–46.1)
HGB BLD-MCNC: 8.3 G/DL (ref 11.5–15.4)
HGB UR QL STRIP.AUTO: 250
IMM GRANULOCYTES # BLD AUTO: 0.01 THOUSAND/UL (ref 0–0.2)
IMM GRANULOCYTES NFR BLD AUTO: 0 % (ref 0–2)
KETONES UR STRIP-MCNC: ABNORMAL MG/DL
LEUKOCYTE ESTERASE UR QL STRIP: 100
LIPASE SERPL-CCNC: 95 U/L (ref 23–300)
LYMPHOCYTES # BLD AUTO: 1.7 THOUSANDS/ÂΜL (ref 0.6–4.47)
LYMPHOCYTES NFR BLD AUTO: 31 % (ref 14–44)
MCH RBC QN AUTO: 21.1 PG (ref 26.8–34.3)
MCHC RBC AUTO-ENTMCNC: 28.2 G/DL (ref 31.4–37.4)
MCV RBC AUTO: 75 FL (ref 82–98)
MONOCYTES # BLD AUTO: 0.38 THOUSAND/ÂΜL (ref 0.17–1.22)
MONOCYTES NFR BLD AUTO: 7 % (ref 4–12)
NEUTROPHILS # BLD AUTO: 3.27 THOUSANDS/ÂΜL (ref 1.85–7.62)
NEUTS SEG NFR BLD AUTO: 58 % (ref 43–75)
NITRITE UR QL STRIP: NEGATIVE
NON-SQ EPI CELLS URNS QL MICRO: ABNORMAL /HPF
NRBC BLD AUTO-RTO: 0 /100 WBCS
PH UR STRIP.AUTO: 5 [PH]
PLATELET # BLD AUTO: 352 THOUSANDS/UL (ref 149–390)
PMV BLD AUTO: 9.7 FL (ref 8.9–12.7)
POTASSIUM SERPL-SCNC: 3.6 MMOL/L (ref 3.5–5.3)
PROT SERPL-MCNC: 7.7 G/DL (ref 6.4–8.4)
PROT UR STRIP-MCNC: ABNORMAL MG/DL
RBC # BLD AUTO: 3.94 MILLION/UL (ref 3.81–5.12)
RBC #/AREA URNS AUTO: ABNORMAL /HPF
SODIUM SERPL-SCNC: 140 MMOL/L (ref 135–147)
SP GR UR STRIP.AUTO: 1.02 (ref 1–1.04)
UROBILINOGEN UA: 1 MG/DL
WBC # BLD AUTO: 5.58 THOUSAND/UL (ref 4.31–10.16)
WBC #/AREA URNS AUTO: ABNORMAL /HPF

## 2022-12-14 RX ORDER — SODIUM CHLORIDE 9 MG/ML
250 INJECTION, SOLUTION INTRAVENOUS CONTINUOUS
Status: DISCONTINUED | OUTPATIENT
Start: 2022-12-14 | End: 2022-12-14 | Stop reason: HOSPADM

## 2022-12-14 RX ORDER — SUCRALFATE 1 G/1
1 TABLET ORAL 4 TIMES DAILY
Qty: 20 TABLET | Refills: 0 | Status: SHIPPED | OUTPATIENT
Start: 2022-12-14 | End: 2022-12-20 | Stop reason: SDUPTHER

## 2022-12-14 RX ORDER — MAGNESIUM HYDROXIDE/ALUMINUM HYDROXICE/SIMETHICONE 120; 1200; 1200 MG/30ML; MG/30ML; MG/30ML
30 SUSPENSION ORAL ONCE
Status: DISCONTINUED | OUTPATIENT
Start: 2022-12-14 | End: 2022-12-14 | Stop reason: HOSPADM

## 2022-12-14 RX ORDER — OMEPRAZOLE 20 MG/1
20 CAPSULE, DELAYED RELEASE ORAL DAILY
Qty: 30 CAPSULE | Refills: 0 | Status: SHIPPED | OUTPATIENT
Start: 2022-12-14

## 2022-12-14 RX ORDER — FERROUS SULFATE 325(65) MG
325 TABLET ORAL DAILY
Qty: 30 TABLET | Refills: 0 | Status: SHIPPED | OUTPATIENT
Start: 2022-12-14

## 2022-12-14 RX ORDER — SUCRALFATE 1 G/1
1 TABLET ORAL ONCE
Status: COMPLETED | OUTPATIENT
Start: 2022-12-14 | End: 2022-12-14

## 2022-12-14 RX ORDER — ONDANSETRON 4 MG/1
4 TABLET, FILM COATED ORAL EVERY 8 HOURS PRN
Qty: 20 TABLET | Refills: 0 | Status: SHIPPED | OUTPATIENT
Start: 2022-12-14

## 2022-12-14 RX ORDER — ACETAMINOPHEN 325 MG/1
650 TABLET ORAL EVERY 6 HOURS PRN
Qty: 30 TABLET | Refills: 0 | Status: SHIPPED | OUTPATIENT
Start: 2022-12-14

## 2022-12-14 RX ORDER — ONDANSETRON 2 MG/ML
4 INJECTION INTRAMUSCULAR; INTRAVENOUS ONCE
Status: COMPLETED | OUTPATIENT
Start: 2022-12-14 | End: 2022-12-14

## 2022-12-14 RX ORDER — ACETAMINOPHEN 325 MG/1
650 TABLET ORAL ONCE
Status: COMPLETED | OUTPATIENT
Start: 2022-12-14 | End: 2022-12-14

## 2022-12-14 RX ADMIN — ACETAMINOPHEN 650 MG: 325 TABLET ORAL at 13:00

## 2022-12-14 RX ADMIN — SUCRALFATE 1 G: 1 TABLET ORAL at 12:59

## 2022-12-14 RX ADMIN — ONDANSETRON 4 MG: 2 INJECTION INTRAMUSCULAR; INTRAVENOUS at 12:32

## 2022-12-14 RX ADMIN — SODIUM CHLORIDE 250 ML/HR: 0.9 INJECTION, SOLUTION INTRAVENOUS at 12:32

## 2022-12-14 NOTE — ED PROVIDER NOTES
History  Chief Complaint   Patient presents with   • Diarrhea     Pt states "I think I have food poisoning, yesterday I got chicken from [employer], and I was almost done eating and I chewed something crunchy " Reports diarrhea, vomiting, and headache  • Vomiting   • Headache     Pt with nausea and vomiting x 4 , diarrhea x 4 since last night ordering out on [employer]       Nausea  The primary symptoms include abdominal pain, nausea, vomiting and diarrhea  The illness began yesterday  The onset was gradual  The problem has not changed since onset  The illness does not include chills  Associated medical issues do not include inflammatory bowel disease  Prior to Admission Medications   Prescriptions Last Dose Informant Patient Reported? Taking?    cholecalciferol (VITAMIN D3) 1,000 units tablet   No No   Sig: Take 1 tablet (1,000 Units total) by mouth daily   ibuprofen (MOTRIN) 600 mg tablet   No No   Sig: Take 1 tablet (600 mg total) by mouth every 6 (six) hours as needed for mild pain or moderate pain   lidocaine (LMX) 4 % cream   No No   Sig: Apply topically as needed for mild pain   Patient not taking: Reported on 11/22/2022   lidocaine (Lidoderm) 5 %   No No   Sig: Apply 1 patch topically daily Remove & Discard patch within 12 hours or as directed by MD   methocarbamol (ROBAXIN) 500 mg tablet   No No   Sig: Take 1 tablet (500 mg total) by mouth 2 (two) times a day   Patient not taking: No sig reported   methocarbamol (ROBAXIN) 750 mg tablet   No No   Sig: Take 1 tablet (750 mg total) by mouth 3 (three) times a day for 3 days   naproxen (NAPROSYN) 500 mg tablet   No No   Sig: Take 1 tablet (500 mg total) by mouth 2 (two) times a day with meals for 7 days   naproxen (Naprosyn) 500 mg tablet   No No   Sig: Take 1 tablet (500 mg total) by mouth 2 (two) times a day with meals      Facility-Administered Medications: None       Past Medical History:   Diagnosis Date   • Abnormal Pap smear of cervix     2002 colpo WNL;    • Anxiety     no meds since 2018   • Gonorrhea    • Heart problem 2017    swelling of L side of heart - hospitalized x7 days @[place], unsure of specifics, no issues thereafter       Past Surgical History:   Procedure Laterality Date   •  SECTION     • TUBAL LIGATION         Family History   Problem Relation Age of Onset   • Hypertension Mother    • Hypertension Father    • Cancer Maternal Uncle         throat   • Breast cancer Neg Hx    • Colon cancer Neg Hx    • Ovarian cancer Neg Hx      I have reviewed and agree with the history as documented  E-Cigarette/Vaping   • E-Cigarette Use Never User      E-Cigarette/Vaping Substances   • Nicotine No    • THC No    • CBD No    • Flavoring No    • Other No    • Unknown No      Social History     Tobacco Use   • Smoking status: Former     Types: Cigars, Cigarettes     Quit date:      Years since quittin 9   • Smokeless tobacco: Never   • Tobacco comments:     Uses hooka now   Vaping Use   • Vaping Use: Never used   Substance Use Topics   • Alcohol use: Yes     Comment: socially   • Drug use: Yes     Types: Marijuana     Comment: daily marijuana use       Review of Systems   Constitutional: Negative  Negative for chills  HENT: Negative  Eyes: Negative  Respiratory: Negative  Cardiovascular: Negative  Gastrointestinal: Positive for abdominal pain, diarrhea, nausea and vomiting  Endocrine: Negative  Genitourinary: Negative  Musculoskeletal: Negative  Skin: Negative  Allergic/Immunologic: Negative  Neurological: Positive for headaches  Hematological: Negative  Psychiatric/Behavioral: Negative  All other systems reviewed and are negative  Physical Exam  Physical Exam  Vitals and nursing note reviewed  Constitutional:       Appearance: Normal appearance  She is normal weight  Comments: 135pm  Pt feeling much better    HENT:      Head: Normocephalic and atraumatic        Right Ear: Tympanic membrane, ear canal and external ear normal       Left Ear: Tympanic membrane, ear canal and external ear normal       Nose: Nose normal  No congestion  Mouth/Throat:      Mouth: Mucous membranes are moist       Pharynx: Oropharynx is clear  Eyes:      Extraocular Movements: Extraocular movements intact  Conjunctiva/sclera: Conjunctivae normal       Pupils: Pupils are equal, round, and reactive to light  Cardiovascular:      Rate and Rhythm: Normal rate and regular rhythm  Pulses: Normal pulses  Heart sounds: Normal heart sounds  Pulmonary:      Effort: Pulmonary effort is normal       Breath sounds: Normal breath sounds  Abdominal:      General: Abdomen is flat  Bowel sounds are normal       Palpations: Abdomen is soft  Comments: midepigastric and ruq pain  Suprapubic pressure    Musculoskeletal:         General: Normal range of motion  Cervical back: Normal range of motion and neck supple  Skin:     General: Skin is warm  Capillary Refill: Capillary refill takes less than 2 seconds  Neurological:      General: No focal deficit present  Mental Status: She is alert and oriented to person, place, and time     Psychiatric:         Mood and Affect: Mood normal          Behavior: Behavior normal          Vital Signs  ED Triage Vitals [12/14/22 1126]   Temperature Pulse Respirations Blood Pressure SpO2   98 3 °F (36 8 °C) 88 16 125/80 100 %      Temp Source Heart Rate Source Patient Position - Orthostatic VS BP Location FiO2 (%)   Oral Monitor Sitting Left arm --      Pain Score       --           Vitals:    12/14/22 1126   BP: 125/80   Pulse: 88   Patient Position - Orthostatic VS: Sitting         Visual Acuity      ED Medications  Medications   sodium chloride 0 9 % infusion (0 mL/hr Intravenous Stopped 12/14/22 1354)   aluminum-magnesium hydroxide-simethicone (MYLANTA) oral suspension 30 mL (30 mL Oral Not Given 12/14/22 1302)   ondansetron (ZOFRAN) injection 4 mg (4 mg Intravenous Given 12/14/22 1232)   acetaminophen (TYLENOL) tablet 650 mg (650 mg Oral Given 12/14/22 1300)   sucralfate (CARAFATE) tablet 1 g (1 g Oral Given 12/14/22 1259)       Diagnostic Studies  Results Reviewed     Procedure Component Value Units Date/Time    Lipase [[id number]]  (Normal) Collected: 12/14/22 1232    Lab Status: Final result Specimen: Blood from Arm, Right Updated: 12/14/22 1252     Lipase 95 u/L     Comprehensive metabolic panel [[id number]]  (Abnormal) Collected: 12/14/22 1232    Lab Status: Final result Specimen: Blood from Arm, Right Updated: 12/14/22 1252     Sodium 140 mmol/L      Potassium 3 6 mmol/L      Chloride 107 mmol/L      CO2 28 mmol/L      ANION GAP 5 mmol/L      BUN 8 mg/dL      Creatinine 0 84 mg/dL      Glucose 103 mg/dL      Calcium 8 8 mg/dL      AST 20 U/L      ALT 12 U/L      Alkaline Phosphatase 54 U/L      Total Protein 7 7 g/dL      Albumin 4 2 g/dL      Total Bilirubin 0 32 mg/dL      eGFR 87 ml/min/1 73sq m     Narrative:      [facility] guidelines for Chronic Kidney Disease (CKD):   •  Stage 1 with normal or high GFR (GFR > 90 mL/min/1 73 square meters)  •  Stage 2 Mild CKD (GFR = 60-89 mL/min/1 73 square meters)  •  Stage 3A Moderate CKD (GFR = 45-59 mL/min/1 73 square meters)  •  Stage 3B Moderate CKD (GFR = 30-44 mL/min/1 73 square meters)  •  Stage 4 Severe CKD (GFR = 15-29 mL/min/1 73 square meters)  •  Stage 5 End Stage CKD (GFR <15 mL/min/1 73 square meters)  Note: GFR calculation is accurate only with a steady state creatinine    CBC and differential [[id number]]  (Abnormal) Collected: 12/14/22 1232    Lab Status: Final result Specimen: Blood from Arm, Right Updated: 12/14/22 1239     WBC 5 58 Thousand/uL      RBC 3 94 Million/uL      Hemoglobin 8 3 g/dL      Hematocrit 29 4 %      MCV 75 fL      MCH 21 1 pg      MCHC 28 2 g/dL      RDW 17 1 %      MPV 9 7 fL      Platelets 705 Thousands/uL      nRBC 0 /100 WBCs Neutrophils Relative 58 %      Immat GRANS % 0 %      Lymphocytes Relative 31 %      Monocytes Relative 7 %      Eosinophils Relative 3 %      Basophils Relative 1 %      Neutrophils Absolute 3 27 Thousands/µL      Immature Grans Absolute 0 01 Thousand/uL      Lymphocytes Absolute 1 70 Thousands/µL      Monocytes Absolute 0 38 Thousand/µL      Eosinophils Absolute 0 18 Thousand/µL      Basophils Absolute 0 04 Thousands/µL     Urine Microscopic [[id number]]  (Abnormal) Collected: 12/14/22 1220    Lab Status: Final result Specimen: Urine, Clean Catch Updated: 12/14/22 1235     RBC, UA Innumerable /hpf      WBC, UA       Field obscured, unable to enumerate     /hpf     Epithelial Cells       Field obscured, unable to enumerate     /hpf     Bacteria, UA       Field obscured, unable to enumerate     /hpf    Urine culture [[id number]] Collected: 12/14/22 1220    Lab Status:  In process Specimen: Urine, Clean Catch Updated: 12/14/22 1235    UA w Reflex to Microscopic w Reflex to Culture [[id number]]  (Abnormal) Collected: 12/14/22 1220    Lab Status: Final result Specimen: Urine, Clean Catch Updated: 12/14/22 1230     Color, UA Red     Clarity, UA Turbid     Specific Gravity, UA 1 025     pH, UA 5 0     Leukocytes,  0     Nitrite, UA Negative     Protein,  (2+) mg/dl      Glucose, UA Negative mg/dl      Ketones, UA 5 (Trace) mg/dl      Bilirubin, UA Negative     Occult Blood,  0     UROBILINOGEN UA 1 0 mg/dL     POCT pregnancy, urine [[id number]]  (Normal) Resulted: 12/14/22 1223    Lab Status: Final result Updated: 12/14/22 1223     EXT Preg Test, Ur Negative     Control Valid                 No orders to display              Procedures  Procedures         ED Course                                             MDM    Disposition  Final diagnoses:   Gastroenteritis   Anemia     Time reflects when diagnosis was documented in both MDM as applicable and the Disposition within this note     Time User Action Codes Description Comment    12/14/2022  1:43 PM Mindy Monahan  Add [K52 9] Gastroenteritis     12/14/2022  1:44 PM Apple Sewell  Add [D64 9] Anemia       ED Disposition     ED Disposition   Discharge    Condition   Stable    Date/Time   Wed Dec 14, 2022  1:43 PM    Comment   Pat Walker discharge to home/self care                 Follow-up Information     Follow up With Specialties Details Why Contact Info    Yarely Finnegan MD Family Medicine  return if condition worsens [address]  [phone]            Discharge Medication List as of 12/14/2022  1:48 PM      START taking these medications    Details   acetaminophen (TYLENOL) 325 mg tablet Take 2 tablets (650 mg total) by mouth every 6 (six) hours as needed for moderate pain, Starting Wed 12/14/2022, Print      ferrous sulfate 325 (65 Fe) mg tablet Take 1 tablet (325 mg total) by mouth daily, Starting Wed 12/14/2022, Print      omeprazole (PriLOSEC) 20 mg delayed release capsule Take 1 capsule (20 mg total) by mouth daily, Starting Wed 12/14/2022, Print      ondansetron (ZOFRAN) 4 mg tablet Take 1 tablet (4 mg total) by mouth every 8 (eight) hours as needed for nausea or vomiting, Starting Wed 12/14/2022, Print      sucralfate (CARAFATE) 1 g tablet Take 1 tablet (1 g total) by mouth 4 (four) times a day, Starting Wed 12/14/2022, Print         CONTINUE these medications which have NOT CHANGED    Details   cholecalciferol (VITAMIN D3) 1,000 units tablet Take 1 tablet (1,000 Units total) by mouth daily, Starting Wed 12/22/2021, Until Fri 1/21/2022, Normal      ibuprofen (MOTRIN) 600 mg tablet Take 1 tablet (600 mg total) by mouth every 6 (six) hours as needed for mild pain or moderate pain, Starting Fri 7/9/2021, Normal      lidocaine (Lidoderm) 5 % Apply 1 patch topically daily Remove & Discard patch within 12 hours or as directed by MD, Starting Tue 11/22/2022, Print      lidocaine (LMX) 4 % cream Apply topically as needed for mild pain, Starting Fri 7/22/2022, Normal      methocarbamol (ROBAXIN) 500 mg tablet Take 1 tablet (500 mg total) by mouth 2 (two) times a day, Starting Fri 7/9/2021, Normal      naproxen (Naprosyn) 500 mg tablet Take 1 tablet (500 mg total) by mouth 2 (two) times a day with meals, Starting Tue 11/22/2022, Print             No discharge procedures on file      PDMP Review     None          ED Provider  Electronically Signed by           Jeremie Quintana PA-C  12/14/22 7484

## 2022-12-15 LAB — BACTERIA UR CULT: NORMAL

## 2022-12-20 ENCOUNTER — OFFICE VISIT (OUTPATIENT)
Dept: FAMILY MEDICINE CLINIC | Facility: CLINIC | Age: 37
End: 2022-12-20

## 2022-12-20 VITALS
DIASTOLIC BLOOD PRESSURE: 78 MMHG | WEIGHT: 188 LBS | HEART RATE: 92 BPM | SYSTOLIC BLOOD PRESSURE: 110 MMHG | TEMPERATURE: 97.3 F | RESPIRATION RATE: 18 BRPM | OXYGEN SATURATION: 98 % | HEIGHT: 63 IN | BODY MASS INDEX: 33.31 KG/M2

## 2022-12-20 DIAGNOSIS — R19.7 DIARRHEA OF PRESUMED INFECTIOUS ORIGIN: ICD-10-CM

## 2022-12-20 DIAGNOSIS — K52.9 GASTROENTERITIS: Primary | ICD-10-CM

## 2022-12-20 DIAGNOSIS — M54.12 CERVICAL RADICULOPATHY: ICD-10-CM

## 2022-12-20 RX ORDER — NAPROXEN 500 MG/1
500 TABLET ORAL 2 TIMES DAILY WITH MEALS
Qty: 30 TABLET | Refills: 0 | Status: SHIPPED | OUTPATIENT
Start: 2022-12-20

## 2022-12-20 RX ORDER — SUCRALFATE 1 G/1
1 TABLET ORAL 4 TIMES DAILY
Qty: 20 TABLET | Refills: 0 | Status: SHIPPED | OUTPATIENT
Start: 2022-12-20

## 2022-12-20 NOTE — PROGRESS NOTES
Name: [facility]      : 1985      MRN: [id number]  Encounter Provider: JONATHON Schmid  Encounter Date: 2022   Encounter department: [address] [place] [address]     1  Gastroenteritis  Assessment & Plan:  - Reviewed appropriate diet to help treat diarrhea  - Increase fluid intake  - Yogurt or probiotic daily   - Stool testing    - Naproxen PRN for headache    - Can continue Zofran and Carafate as needed  Orders:  -     Stool Enteric Bacterial Panel by PCR; Future  -     Stool culture; Future  -     Ova and Parasite(X3) with Giardia; Future  -     sucralfate (CARAFATE) 1 g tablet; Take 1 tablet (1 g total) by mouth 4 (four) times a day    2  Diarrhea of presumed infectious origin  -     Stool Enteric Bacterial Panel by PCR; Future  -     Stool culture; Future  -     Ova and Parasite(X3) with Giardia; Future    3  Cervical radiculopathy  -     naproxen (Naprosyn) 500 mg tablet; Take 1 tablet (500 mg total) by mouth 2 (two) times a day with meals         Subjective     HPI     Aleksey presented to the office for c/o diarrhea x1 week  Pt was seen in ED for this concern on 22  Pt reports finding rat droppings in the breading of [employer] chicken with vomiting and diarrhea starting within hours of eating the chicken  Negative for fever or blood in stool  Positive for abdominal pain  Today, pt reporting loose, mucusy stools 3-4x per day and headache  She has not been able to eat due to diarrhea with any food intake and aversion to food  Tolerating water  Other symptoms have resolved  Pt requests testing to determine pathogen  Review of Systems   Constitutional: Positive for appetite change  Negative for chills, fatigue, fever and unexpected weight change  Respiratory: Negative for cough, shortness of breath and wheezing  Cardiovascular: Negative for chest pain and palpitations  Gastrointestinal: Positive for diarrhea  Negative for abdominal distention, abdominal pain, blood in stool, constipation, nausea and vomiting  Genitourinary: Negative for difficulty urinating  Skin: Negative for rash  Neurological: Positive for headaches  Negative for dizziness, syncope and weakness  Psychiatric/Behavioral: The patient is nervous/anxious  All other systems reviewed and are negative        Past Medical History:   Diagnosis Date   • Abnormal Pap smear of cervix      colpo WNL;    • Anxiety     no meds since 2018   • Gonorrhea    • Heart problem 2017    swelling of L side of heart - hospitalized x7 days @[place], unsure of specifics, no issues thereafter     Past Surgical History:   Procedure Laterality Date   •  SECTION     • TUBAL LIGATION       Family History   Problem Relation Age of Onset   • Hypertension Mother    • Hypertension Father    • Cancer Maternal Uncle         throat   • Breast cancer Neg Hx    • Colon cancer Neg Hx    • Ovarian cancer Neg Hx      Social History     Socioeconomic History   • Marital status: Single     Spouse name: None   • Number of children: None   • Years of education: None   • Highest education level: None   Occupational History   • None   Tobacco Use   • Smoking status: Former     Types: Cigars, Cigarettes     Quit date:      Years since quittin 9   • Smokeless tobacco: Never   • Tobacco comments:     Uses hooka now   Vaping Use   • Vaping Use: Never used   Substance and Sexual Activity   • Alcohol use: Yes     Comment: socially   • Drug use: Yes     Types: Marijuana     Comment: daily marijuana use   • Sexual activity: Yes     Partners: Male     Birth control/protection: Female Sterilization   Other Topics Concern   • None   Social History Narrative   • None     Social Determinants of Health     Financial Resource Strain: Low Risk    • Difficulty of Paying Living Expenses: Not hard at all   Food Insecurity: No Food Insecurity   • Worried About Running Out of Food in the Last Year: Never true   • Ran Out of Food in the Last Year: Never true   Transportation Needs: No Transportation Needs   • Lack of Transportation (Medical): No   • Lack of Transportation (Non-Medical):  No   Physical Activity: Not on file   Stress: Not on file   Social Connections: Not on file   Intimate Partner Violence: Not on file   Housing Stability: Low Risk    • Unable to Pay for Housing in the Last Year: No   • Number of Places Lived in the Last Year: 1   • Unstable Housing in the Last Year: No     Current Outpatient Medications on File Prior to Visit   Medication Sig   • acetaminophen (TYLENOL) 325 mg tablet Take 2 tablets (650 mg total) by mouth every 6 (six) hours as needed for moderate pain   • cholecalciferol (VITAMIN D3) 1,000 units tablet Take 1 tablet (1,000 Units total) by mouth daily   • ferrous sulfate 325 (65 Fe) mg tablet Take 1 tablet (325 mg total) by mouth daily   • ibuprofen (MOTRIN) 600 mg tablet Take 1 tablet (600 mg total) by mouth every 6 (six) hours as needed for mild pain or moderate pain   • lidocaine (Lidoderm) 5 % Apply 1 patch topically daily Remove & Discard patch within 12 hours or as directed by MD   • lidocaine (LMX) 4 % cream Apply topically as needed for mild pain (Patient not taking: Reported on 11/22/2022)   • methocarbamol (ROBAXIN) 500 mg tablet Take 1 tablet (500 mg total) by mouth 2 (two) times a day (Patient not taking: No sig reported)   • methocarbamol (ROBAXIN) 750 mg tablet Take 1 tablet (750 mg total) by mouth 3 (three) times a day for 3 days   • naproxen (NAPROSYN) 500 mg tablet Take 1 tablet (500 mg total) by mouth 2 (two) times a day with meals for 7 days   • omeprazole (PriLOSEC) 20 mg delayed release capsule Take 1 capsule (20 mg total) by mouth daily   • ondansetron (ZOFRAN) 4 mg tablet Take 1 tablet (4 mg total) by mouth every 8 (eight) hours as needed for nausea or vomiting     No Known Allergies  Immunization History   Administered Date(s) Administered   • Hep A / Hep B 02/11/2009       Objective     /78 (BP Location: Left arm, Patient Position: Sitting, Cuff Size: Adult)   Pulse 92   Temp (!) 97 3 °F (36 3 °C) (Temporal)   Resp 18   Ht 5' 3" (1 6 m)   Wt 85 3 kg (188 lb)   LMP 12/11/2022 (Exact Date)   SpO2 98%   BMI 33 30 kg/m²     Physical Exam  Vitals reviewed  Constitutional:       General: She is not in acute distress  Appearance: She is overweight  She is not ill-appearing or diaphoretic  HENT:      Head: Normocephalic and atraumatic  Mouth/Throat:      Mouth: Mucous membranes are moist       Pharynx: Oropharynx is clear  Eyes:      General: Lids are normal       Conjunctiva/sclera: Conjunctivae normal       Pupils: Pupils are equal, round, and reactive to light  Cardiovascular:      Rate and Rhythm: Normal rate and regular rhythm  Heart sounds: Normal heart sounds  No murmur heard  Pulmonary:      Effort: Pulmonary effort is normal  No tachypnea  Breath sounds: Normal breath sounds  No decreased breath sounds or wheezing  Abdominal:      General: Bowel sounds are increased  There is no distension  Palpations: Abdomen is soft  Tenderness: There is no abdominal tenderness  There is no guarding or rebound  Musculoskeletal:      Cervical back: Neck supple  Right lower leg: No edema  Left lower leg: No edema  Lymphadenopathy:      Cervical: No cervical adenopathy  Skin:     General: Skin is warm and dry  Neurological:      Mental Status: She is alert and oriented to person, place, and time  Psychiatric:         Attention and Perception: Attention normal          Mood and Affect: Affect normal  Mood is anxious           Speech: Speech normal          Behavior: Behavior normal        JONATHON Schmid

## 2022-12-20 NOTE — LETTER
December 20, 2022     Patient: David Castañeda  YOB: 1985  Date of Visit: 12/20/2022      To Whom it May Concern:    Ry Whittaker is under my professional care  Maggie Ours was seen in my office on 12/20/2022  Maggie Ours may return to work on 12/21/2022  If you have any questions or concerns, please don't hesitate to call           Sincerely,          JONATHON Ness        CC: No Recipients

## 2022-12-21 ENCOUNTER — APPOINTMENT (OUTPATIENT)
Dept: LAB | Facility: HOSPITAL | Age: 37
End: 2022-12-21

## 2022-12-21 DIAGNOSIS — R19.7 DIARRHEA OF PRESUMED INFECTIOUS ORIGIN: ICD-10-CM

## 2022-12-21 DIAGNOSIS — K52.9 GASTROENTERITIS: ICD-10-CM

## 2022-12-22 LAB
CAMPYLOBACTER DNA SPEC NAA+PROBE: NORMAL
SALMONELLA DNA SPEC QL NAA+PROBE: NORMAL
SHIGA TOXIN STX GENE SPEC NAA+PROBE: NORMAL
SHIGELLA DNA SPEC QL NAA+PROBE: NORMAL

## 2022-12-23 ENCOUNTER — APPOINTMENT (OUTPATIENT)
Dept: LAB | Facility: HOSPITAL | Age: 37
End: 2022-12-23

## 2022-12-23 DIAGNOSIS — R19.7 DIARRHEA OF PRESUMED INFECTIOUS ORIGIN: ICD-10-CM

## 2022-12-23 DIAGNOSIS — K52.9 INFLAMMATORY BOWEL DISEASE: Primary | ICD-10-CM

## 2022-12-25 LAB — G LAMBLIA AG STL QL IA: NEGATIVE

## 2022-12-27 PROBLEM — K52.9 GASTROENTERITIS: Status: ACTIVE | Noted: 2022-12-27

## 2022-12-28 NOTE — ASSESSMENT & PLAN NOTE
- Reviewed appropriate diet to help treat diarrhea  - Increase fluid intake  - Yogurt or probiotic daily   - Stool testing    - Naproxen PRN for headache    - Can continue Zofran and Carafate as needed

## 2022-12-30 ENCOUNTER — OFFICE VISIT (OUTPATIENT)
Dept: FAMILY MEDICINE CLINIC | Facility: CLINIC | Age: 37
End: 2022-12-30

## 2022-12-30 VITALS
HEART RATE: 90 BPM | TEMPERATURE: 96.8 F | WEIGHT: 189.4 LBS | HEIGHT: 63 IN | BODY MASS INDEX: 33.56 KG/M2 | RESPIRATION RATE: 18 BRPM | SYSTOLIC BLOOD PRESSURE: 108 MMHG | DIASTOLIC BLOOD PRESSURE: 70 MMHG | OXYGEN SATURATION: 99 %

## 2022-12-30 DIAGNOSIS — E66.09 CLASS 1 OBESITY DUE TO EXCESS CALORIES WITHOUT SERIOUS COMORBIDITY WITH BODY MASS INDEX (BMI) OF 33.0 TO 33.9 IN ADULT: ICD-10-CM

## 2022-12-30 DIAGNOSIS — G44.209 ACUTE NON INTRACTABLE TENSION-TYPE HEADACHE: Primary | ICD-10-CM

## 2022-12-30 NOTE — PROGRESS NOTES
Assessment/Plan:    Headaches  - Patient has been experiencing intermittent headaches for many years, but have now become more persistent and now occurring daily  Patient is requesting referral to neurology  - Referral placed today  Diagnoses and all orders for this visit:    Acute non intractable tension-type headache  -     Ambulatory Referral to Neurology; Future    Class 1 obesity due to excess calories without serious comorbidity with body mass index (BMI) of 33 0 to 33 9 in adult        All of patients questions were answered  Patient understands and agrees with the above plan  Return in about 1 month (around 1/30/2023) for Annual physical     Lanney Minor  12/30/22  [facility] 60 [facility]          Subjective:     Patient ID: Emilia Putnam  is a 42 y o  female who presents today in office for headaches  - Patient is a 42 y o  female who presents today for headaches  Patient notes she has been experiencing headaches frequently since she was little  Patient notes that headaches have now become more persistent, occurring every day  Patient notes she does experience associated numbness of her left arm at times  Patient notes the location of the headache varies  Patient admits to associated nausea, blurry vision, photophobia  Patient notes she does follow regularly with her eye doctor  The following portions of the patient's history were reviewed and updated as appropriate: allergies, current medications, past family history, past medical history, past social history, past surgical history and problem list         Review of Systems   Constitutional: Negative for chills and fever  HENT: Negative for congestion and sore throat  Eyes: Positive for photophobia and visual disturbance  Negative for pain  Respiratory: Negative for cough, chest tightness and shortness of breath  Cardiovascular: Negative for chest pain, palpitations and leg swelling     Gastrointestinal: Positive for nausea  Negative for abdominal pain, constipation, diarrhea and vomiting  Musculoskeletal: Negative for arthralgias  Neurological: Positive for numbness and headaches  Negative for dizziness  BMI Counseling: Body mass index is 33 55 kg/m²  The BMI is above normal  Nutrition recommendations include decreasing portion sizes, encouraging healthy choices of fruits and vegetables, consuming healthier snacks, limiting drinks that contain sugar, moderation in carbohydrate intake, increasing intake of lean protein and reducing intake of cholesterol  Exercise recommendations include moderate physical activity 150 minutes/week  No pharmacotherapy was ordered  Rationale for BMI follow-up plan is due to patient being overweight or obese  Objective:   Vitals:    12/30/22 1555   BP: 108/70   BP Location: Left arm   Patient Position: Sitting   Cuff Size: Adult   Pulse: 90   Resp: 18   Temp: (!) 96 8 °F (36 °C)   TempSrc: Temporal   SpO2: 99%   Weight: 85 9 kg (189 lb 6 4 oz)   Height: 5' 3" (1 6 m)         Physical Exam  Vitals and nursing note reviewed  Constitutional:       General: She is not in acute distress  Appearance: She is well-developed  HENT:      Head: Normocephalic and atraumatic  Right Ear: External ear normal       Left Ear: External ear normal       Nose: Nose normal    Eyes:      Conjunctiva/sclera: Conjunctivae normal    Cardiovascular:      Rate and Rhythm: Normal rate and regular rhythm  Pulses: Normal pulses  Heart sounds: Normal heart sounds  Pulmonary:      Effort: Pulmonary effort is normal  No respiratory distress  Breath sounds: Normal breath sounds  No wheezing  Musculoskeletal:      Cervical back: Normal range of motion and neck supple  Skin:     General: Skin is warm and dry  Neurological:      Mental Status: She is alert and oriented to person, place, and time     Psychiatric:         Behavior: Behavior normal

## 2023-02-25 PROBLEM — K52.9 GASTROENTERITIS: Status: RESOLVED | Noted: 2022-12-27 | Resolved: 2023-02-25

## 2023-06-26 ENCOUNTER — APPOINTMENT (OUTPATIENT)
Dept: LAB | Facility: CLINIC | Age: 38
End: 2023-06-26
Payer: MEDICARE

## 2023-06-26 ENCOUNTER — ANNUAL EXAM (OUTPATIENT)
Dept: OBGYN CLINIC | Facility: CLINIC | Age: 38
End: 2023-06-26

## 2023-06-26 VITALS
DIASTOLIC BLOOD PRESSURE: 68 MMHG | SYSTOLIC BLOOD PRESSURE: 103 MMHG | HEIGHT: 63 IN | WEIGHT: 183 LBS | HEART RATE: 88 BPM | BODY MASS INDEX: 32.43 KG/M2

## 2023-06-26 DIAGNOSIS — D64.9 CHRONIC ANEMIA: ICD-10-CM

## 2023-06-26 DIAGNOSIS — N92.0 MENORRHAGIA WITH REGULAR CYCLE: ICD-10-CM

## 2023-06-26 DIAGNOSIS — Z12.31 ENCOUNTER FOR SCREENING MAMMOGRAM FOR MALIGNANT NEOPLASM OF BREAST: ICD-10-CM

## 2023-06-26 DIAGNOSIS — Z11.3 SCREEN FOR STD (SEXUALLY TRANSMITTED DISEASE): ICD-10-CM

## 2023-06-26 DIAGNOSIS — Z01.419 ROUTINE GYNECOLOGICAL EXAMINATION: Primary | ICD-10-CM

## 2023-06-26 LAB
FERRITIN SERPL-MCNC: 1 NG/ML (ref 11–307)
HBV SURFACE AG SER QL: NORMAL
HCV AB SER QL: REACTIVE
IRON SATN MFR SERPL: 3 % (ref 15–50)
IRON SERPL-MCNC: 12 UG/DL (ref 50–170)
TIBC SERPL-MCNC: 470 UG/DL (ref 250–450)
TREPONEMA PALLIDUM IGG+IGM AB [PRESENCE] IN SERUM OR PLASMA BY IMMUNOASSAY: NORMAL

## 2023-06-26 PROCEDURE — 83540 ASSAY OF IRON: CPT

## 2023-06-26 PROCEDURE — 86803 HEPATITIS C AB TEST: CPT

## 2023-06-26 PROCEDURE — 86696 HERPES SIMPLEX TYPE 2 TEST: CPT

## 2023-06-26 PROCEDURE — 83550 IRON BINDING TEST: CPT

## 2023-06-26 PROCEDURE — 87491 CHLMYD TRACH DNA AMP PROBE: CPT | Performed by: OBSTETRICS & GYNECOLOGY

## 2023-06-26 PROCEDURE — 82728 ASSAY OF FERRITIN: CPT

## 2023-06-26 PROCEDURE — 86780 TREPONEMA PALLIDUM: CPT

## 2023-06-26 PROCEDURE — 36415 COLL VENOUS BLD VENIPUNCTURE: CPT

## 2023-06-26 PROCEDURE — 87389 HIV-1 AG W/HIV-1&-2 AB AG IA: CPT

## 2023-06-26 PROCEDURE — 87340 HEPATITIS B SURFACE AG IA: CPT

## 2023-06-26 PROCEDURE — 86695 HERPES SIMPLEX TYPE 1 TEST: CPT

## 2023-06-26 PROCEDURE — 87591 N.GONORRHOEAE DNA AMP PROB: CPT | Performed by: OBSTETRICS & GYNECOLOGY

## 2023-06-26 PROCEDURE — 99395 PREV VISIT EST AGE 18-39: CPT | Performed by: OBSTETRICS & GYNECOLOGY

## 2023-06-26 RX ORDER — SODIUM CHLORIDE 9 MG/ML
20 INJECTION, SOLUTION INTRAVENOUS ONCE
OUTPATIENT
Start: 2023-06-29

## 2023-06-26 NOTE — PATIENT INSTRUCTIONS
Thank you for your confidence in our team    We appreciate you and welcome your feedback  If you receive a survey from us, please take a few moments to let us know how we are doing     Sincerely,  Siva Avila, DO

## 2023-06-26 NOTE — PROGRESS NOTES
Anita Liam is a 42 y o  female who presents today for annual GYN exam requesting STI testing and help with her chronic anemia  Her last pap smear was performed 21 and result was negative  She reports  history of abnormal pap smears in her past   Patient will get her baseline mammogram this year after her 41th birthday  She reports menses as heavy  Patient's last menstrual period was 2023  Her general medical history has been reviewed and she reports it as follows:    Past Medical History:   Diagnosis Date   • Abnormal Pap smear of cervix      colpo WNL;    • Anxiety     no meds since 2018   • Gonorrhea    • Heart problem 2017    swelling of L side of heart - hospitalized x7 days @[place], unsure of specifics, no issues thereafter     Past Surgical History:   Procedure Laterality Date   •  SECTION     • TUBAL LIGATION       OB History        5    Para   3    Term   3       0    AB   2    Living   3       SAB   0    IAB   2    Ectopic   0    Multiple   0    Live Births   3               Social History     Tobacco Use   • Smoking status: Former     Types: Cigars, Cigarettes     Quit date:      Years since quitting: 3 4   • Smokeless tobacco: Never   • Tobacco comments:     Uses hooka now   Vaping Use   • Vaping Use: Never used   Substance Use Topics   • Alcohol use: Yes     Comment: socially   • Drug use: Yes     Types: Marijuana     Comment: daily marijuana use     Social History     Substance and Sexual Activity   Sexual Activity Not Currently   • Partners: Male   • Birth control/protection: Female Sterilization     Cancer-related family history includes Cancer in her maternal uncle  There is no history of Breast cancer, Colon cancer, or Ovarian cancer      Current Outpatient Medications   Medication Instructions   • acetaminophen (TYLENOL) 650 mg, Oral, Every 6 hours PRN   • cholecalciferol (VITAMIN D3) "1,000 Units, Oral, Daily   • ferrous sulfate 325 mg, Oral, Daily   • ibuprofen (MOTRIN) 600 mg, Oral, Every 6 hours PRN   • lidocaine (Lidoderm) 5 % 1 patch, Topical, Daily, Remove & Discard patch within 12 hours or as directed by MD   • lidocaine (LMX) 4 % cream Topical, As needed   • methocarbamol (ROBAXIN) 500 mg, Oral, 2 times daily   • naproxen (NAPROSYN) 500 mg, Oral, 2 times daily with meals   • omeprazole (PRILOSEC) 20 mg, Oral, Daily   • ondansetron (ZOFRAN) 4 mg, Oral, Every 8 hours PRN   • sucralfate (CARAFATE) 1 g, Oral, 4 times daily       Review of Systems:  Review of Systems   Genitourinary: Positive for menstrual problem  Menorrhagia   Hematological:        Chronic Anemia   All other systems reviewed and are negative  Physical Exam:  /68   Pulse 88   Ht 5' 3\" (1 6 m)   Wt 83 kg (183 lb)   LMP 06/11/2023   BMI 32 42 kg/m²   Physical Exam  Constitutional:       Appearance: Normal appearance  Genitourinary:      Bladder and urethral meatus normal       No lesions in the vagina  Right Labia: No rash, tenderness or lesions  Left Labia: No tenderness, lesions or rash  No inguinal adenopathy present in the right or left side  No vaginal discharge or bleeding  Right Adnexa: not tender, not full and no mass present  Left Adnexa: not tender, not full and no mass present  No cervical motion tenderness, discharge or lesion  Uterus exam comments: Unable to assess  Uterus is retroverted  No urethral tenderness or mass present  Breasts:     Breasts are soft  Right: No swelling, inverted nipple, mass, nipple discharge, skin change or tenderness  Left: No swelling, inverted nipple, mass, nipple discharge, skin change or tenderness  HENT:      Head: Normocephalic and atraumatic  Cardiovascular:      Rate and Rhythm: Normal rate and regular rhythm     Pulmonary:      Effort: Pulmonary effort is normal       Breath sounds: Normal " breath sounds  Abdominal:      General: Bowel sounds are normal       Palpations: Abdomen is soft  Hernia: There is no hernia in the left inguinal area or right inguinal area  Musculoskeletal:         General: Normal range of motion  Cervical back: Normal range of motion  Lymphadenopathy:      Upper Body:      Right upper body: No supraclavicular or axillary adenopathy  Left upper body: No supraclavicular or axillary adenopathy  Lower Body: No right inguinal adenopathy  No left inguinal adenopathy  Neurological:      Mental Status: She is alert and oriented to person, place, and time  Skin:     General: Skin is warm and dry  Psychiatric:         Mood and Affect: Mood normal    Vitals reviewed  Assessment/Plan:   1  Normal well-woman GYN exam   2  Cervical cancer screening:  Normal cervical exam   Has not received HPV vaccine in the past    3  STD screening:    Orders placed for vaginal GC/CT cultures  Orders placed for serum anti-HIV, anti-HCV, HbsAg, syphilis panel, HSV  4  Breast cancer screening:  Normal breast exam   Order placed for bilateral screening mammogram   Reviewed breast self-awareness  5  Depression Screening: Patient's depression screening was assessed with a PHQ-2 score of 1  Their PHQ-9 score was 5  Clinically patient does not have depression  No treatment is required  6  BMI Counseling: Body mass index is 32 42 kg/m²  Discussed the patient's BMI with her  The BMI is above normal  Nutrition recommendations include decreasing overall calorie intake  7  Contraception:  Sterilization   8  Chronic anemia recommend IV iron supplementation   9  Pelvic sonogram for evaluation of uterus   10  Return to office 3wks  Reviewed with patient that test results are available in [facility] immediately, but that they will not necessarily be reviewed by me immediately    Explained that I will review results at my earliest opportunity and contact patient appropriately

## 2023-06-27 LAB
C TRACH DNA SPEC QL NAA+PROBE: NEGATIVE
HIV 1+2 AB+HIV1 P24 AG SERPL QL IA: NORMAL
HIV 2 AB SERPL QL IA: NORMAL
HIV1 AB SERPL QL IA: NORMAL
HIV1 P24 AG SERPL QL IA: NORMAL
HSV1 IGG SER IA-ACNC: 45.4 INDEX (ref 0–0.9)
HSV2 IGG SER IA-ACNC: 1.46 INDEX (ref 0–0.9)
HSV2 IGG SERPL QL IA: NORMAL
N GONORRHOEA DNA SPEC QL NAA+PROBE: NEGATIVE

## 2023-06-28 ENCOUNTER — OFFICE VISIT (OUTPATIENT)
Dept: NEUROLOGY | Facility: CLINIC | Age: 38
End: 2023-06-28
Payer: MEDICARE

## 2023-06-28 ENCOUNTER — TELEPHONE (OUTPATIENT)
Dept: NEUROLOGY | Facility: CLINIC | Age: 38
End: 2023-06-28

## 2023-06-28 VITALS
WEIGHT: 185.4 LBS | HEART RATE: 78 BPM | TEMPERATURE: 98 F | OXYGEN SATURATION: 99 % | HEIGHT: 63 IN | BODY MASS INDEX: 32.85 KG/M2 | DIASTOLIC BLOOD PRESSURE: 74 MMHG | SYSTOLIC BLOOD PRESSURE: 116 MMHG

## 2023-06-28 DIAGNOSIS — D50.9 IRON DEFICIENCY ANEMIA: ICD-10-CM

## 2023-06-28 DIAGNOSIS — R29.818 SUSPECTED SLEEP APNEA: ICD-10-CM

## 2023-06-28 DIAGNOSIS — G43.009 MIGRAINE WITHOUT AURA AND WITHOUT STATUS MIGRAINOSUS, NOT INTRACTABLE: Primary | ICD-10-CM

## 2023-06-28 DIAGNOSIS — F41.9 ANXIETY DISORDER: ICD-10-CM

## 2023-06-28 DIAGNOSIS — E66.9 OBESITY (BMI 30-39.9): ICD-10-CM

## 2023-06-28 PROCEDURE — 99245 OFF/OP CONSLTJ NEW/EST HI 55: CPT | Performed by: STUDENT IN AN ORGANIZED HEALTH CARE EDUCATION/TRAINING PROGRAM

## 2023-06-28 NOTE — PROGRESS NOTES
[facility] Consult  PATIENT:  Jennifer Almodovar  MRN:  [id number]  :  1985  DATE OF SERVICE:  2023  REFERRED BY: Monica Solis PA-C  PMD: Abiel Sherman PA-C    Assessment/Plan:     Jennifer Almodovar is a very pleasant 42 y o  female with a past medical history that includes anemia, anxiety referred here for evaluation of headache  Initial evaluation 2023     Ms Thomas Yeh reports a longstanding history of headaches since her 25s  She has never been formally evaluated for them or treated for them  She was a little hesitant to trying prescription medications at today's visit, but was open to trying magnesium and B2 supplements for prevention  From an abortive standpoint, I emphasized the importance of decreasing her use of Tylenol  She did not want to try any triptans at today's visit, but will consider them in the future after we obtain further testing  Plan will be to get an MRI due to recent worsening of her headaches and no prior MRI  Reassuring that her CTA completed a few years ago was normal   I also feel that there may be a component of sleep apnea contributing to her symptoms and have recommended that she get screened with a sleep study  There are certainly a lot of lifestyle things that we can address to help with her headaches including sleep, hydration, and physical activity  Furthermore, she is severely iron deficient which could be contributing to her headaches and is pending iron infusions in the next few weeks  Migraine without aura and without status migrainosus, not intractable  -     MRI brain with and without contrast; Future    Anxiety disorder  -     Ambulatory Referral to Psychology; Future    Obesity (BMI 30-39 9)  -     Diagnostic Sleep Study; Future    Iron deficiency anemia    Suspected sleep apnea  -     Diagnostic Sleep Study;  Future      Workup:  - Neurologic assessment reveals unremarkable "neurological exam  - CTA head and neck 9/12/2020: No acute intracranial findings  No evidence of large vessel occlusion   - Due to increased frequency and severity of headaches and migraines I recommend further evaluation with MRI brain with and without contrast to rule out structural or treatable causes of symptoms  - Sleep study  - Psychology referral    Preventative:  - we discussed headache hygiene and lifestyle factors that may improve headaches  - Mg and B2  - Currently on through other providers: None  - Past/ failed/contraindicated: Lexapro (mood - weight gain), Zoloft (mood - \"space out\")  - future options: TCA/SNRI, Topamax, CGRP med, botox    Acute:  - discussed not taking over-the-counter or prescription pain medications more than 2-3 days per week to prevent medication overuse/rebound headache  - Currently on through other providers: None  - Past/ failed/contraindicated: None  - future options:  Triptan, prochlorperazine, Toradol IM or p o , could consider trial of 5 days of Depakote 500 mg nightly or dexamethasone 2 mg daily for prolonged migraine, Felicia Pastures, reyvow, nurtec  Patient instructions   Additional Testing:   Neurodiagnostic workup:  MRI Brain ordered  Sleep study    Referrals:  Psychology    Headache Calendar  Please maintain a headache calendar  Consider using phone applications such as Migraine German or [facility] Migraine Tracker    Headache/migraine treatment:   Acute medications (for immediate treatment of a headache): It is ok to take ibuprofen, acetaminophen or naproxen (Advil, Tylenol,  Aleve, Excedrin) if they help your headaches you should limit these to No more than 2-3 times a week to avoid medication overuse/rebound headaches       Over the counter preventive supplements for headaches/migraines (if you try, try for 3 months straight)  (to take every day to help prevent headaches - not to take at the time of headache):  [x] Magnesium 400mg daily (If any diarrhea or upset stomach, " decrease dose  as tolerated) - oxide or glycinate  [x] Riboflavin (Vitamin B2) 400mg daily - (FYI B2 may make your urine bright/neon yellow)    Lifestyle Recommendations:  [x] SLEEP - Maintain a regular sleep schedule: Adults need at least 7-8 hours of uninterrupted a night  Maintain good sleep hygiene:  Going to bed and waking up at consistent times, avoiding excessive daytime naps, avoiding caffeinated beverages in the evening, avoid excessive stimulation in the evening and generally using bed primarily for sleeping  One hour before bedtime would recommend turning lights down lower, decreasing your activity (may read quietly, listen to music at a low volume)  When you get into bed, should eliminate all technology (no texting, emailing, playing with your phone, iPad or tablet in bed)  [x] HYDRATION - Maintain good hydration  Drink  2L of fluid a day (4 typical small water bottles)  [x] DIET - Maintain good nutrition  In particular don't skip meals and try and eat healthy balanced meals regularly  [x] TRIGGERS - Look for other triggers and avoid them: Limit caffeine to 1-2 cups a day or less  Avoid dietary triggers that you have noticed bring on your headaches (this could include aged cheese, peanuts, MSG, aspartame and nitrates)  [x] EXERCISE - physical exercise as we all know is good for you in many ways, and not only is good for your heart, but also is beneficial for your mental health, cognitive health and  chronic pain/headaches  I would encourage at the least 5 days of physical exercise weekly for at least 30 minutes  Education and Follow-up  [x] Please call with any questions or concerns  Of course if any new concerning symptoms go to the emergency department  [x] Follow up in 4 months  CC: We had the pleasure of evaluating Chicho Grier in neurological consultation today  Chicho Grier is a   right handed female who presents today for evaluation of headaches       History obtained from patient as well as available medical record review  History of Present Illness:   Current medical illnesses  or past medical history include anemia, anxiety    Pertinent history:  -Seen in family medicine clinic in December 2022  Reported intermittent headaches for many years that have become more persistent and are occurring daily    Headaches started at what age? 19years old  How often do the headaches occur?   - as of 6/28/2023: 29/30 (of those, 20 can be more severe migraines)   What time of the day do the headaches start? No particular time of day  How long do the headaches last? A few hours if severe enough  Are you ever headache free? Yes    Aura? without aura     Where is your headache located and pain quality? Varies - frontal/occipital; pressure-like, sharp  What is the intensity of pain? Worst 8/10, Average: 6-7/10  Associated symptoms:   [x] Nausea (previously)  [x] Stiff or sore neck   [x] Photophobia     [x]Phonophobia      [x] Osmophobia  [x] Blurred vision   [x] Prefer quiet, dark room  [x] Light-headed or dizzy     [x] Tinnitus   [x] Hands or feet tingle or feel numb/paresthesias      Things that make the headache worse? No specific movements    Headache triggers:  Watching TV    Have you seen someone else for headaches or pain? No  Have you had trigger point injection performed and how often? No  Have you had Botox injection performed and how often? No   Have you had epidural injections or transforaminal injections performed? Yes, epidural  Are you current pregnant or planning on getting pregnant? No  Tubal ligation  Have you ever had any Brain imaging? yes CTA    Last eye exam: Summer 2022 - wears glasses; non-dilated    What medications do you take or have you taken for your headaches?    ABORTIVE:    OTC medications: Tylenol (daily)  Prescription: None    Past/ failed/contraindicated:  OTC medications: Motrin  Prescription: None    PREVENTIVE:   None    Past/ "failed/contraindicated:  Lexapro (mood - weight gain), Zoloft (mood - \"space out\")      LIFESTYLE  Sleep   - averages: about 3 hours per night  Problems falling asleep?:   Yes  Problems staying asleep?:  Yes  - Positive history of snoring    Physical activity: None    Water: about 2 bottles per day  Caffeine: None    Mood:   History of anxiety  - Previously treated    The following portions of the patient's history were reviewed and updated as appropriate: allergies, current medications, past family history, past medical history, past social history, past surgical history and problem list     Pertinent family history:  Family history of headaches:  no known family members with significant headaches  Any family history of aneurysms - No    Pertinent social history:  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  Lives with daughter    Illicit Drugs: Marijuana  Alcohol/tobacco: Denies tobacco use, alcohol intake: social drinker    Past Medical History:     Past Medical History:   Diagnosis Date   • Abnormal Pap smear of cervix     2002 colpo WNL;    • Anxiety     no meds since 2018   • Gonorrhea 2002   • Heart problem 2017    swelling of L side of heart - hospitalized x7 days @[place], unsure of specifics, no issues thereafter       Patient Active Problem List   Diagnosis   • Chronic anemia       Medications:      Current Outpatient Medications   Medication Sig Dispense Refill   • ibuprofen (MOTRIN) 600 mg tablet Take 1 tablet (600 mg total) by mouth every 6 (six) hours as needed for mild pain or moderate pain 30 tablet 0   • lidocaine (Lidoderm) 5 % Apply 1 patch topically daily Remove & Discard patch within 12 hours or as directed by MD Crespo patch 0   • acetaminophen (TYLENOL) 325 mg tablet Take 2 tablets (650 mg total) by mouth every 6 (six) hours as needed for moderate pain (Patient not taking: Reported on 6/28/2023) 30 tablet 0   • cholecalciferol (VITAMIN D3) 1,000 units tablet Take 1 tablet (1,000 Units total) " by mouth daily (Patient not taking: Reported on 6/28/2023) 30 tablet 0   • ferrous sulfate 325 (65 Fe) mg tablet Take 1 tablet (325 mg total) by mouth daily (Patient not taking: Reported on 6/26/2023) 30 tablet 0   • lidocaine (LMX) 4 % cream Apply topically as needed for mild pain (Patient not taking: Reported on 11/22/2022) 30 g 0   • methocarbamol (ROBAXIN) 500 mg tablet Take 1 tablet (500 mg total) by mouth 2 (two) times a day (Patient not taking: Reported on 7/14/2022) 20 tablet 0   • naproxen (Naprosyn) 500 mg tablet Take 1 tablet (500 mg total) by mouth 2 (two) times a day with meals (Patient not taking: Reported on 6/26/2023) 30 tablet 0   • omeprazole (PriLOSEC) 20 mg delayed release capsule Take 1 capsule (20 mg total) by mouth daily (Patient not taking: Reported on 6/26/2023) 30 capsule 0   • ondansetron (ZOFRAN) 4 mg tablet Take 1 tablet (4 mg total) by mouth every 8 (eight) hours as needed for nausea or vomiting (Patient not taking: Reported on 6/26/2023) 20 tablet 0   • sucralfate (CARAFATE) 1 g tablet Take 1 tablet (1 g total) by mouth 4 (four) times a day (Patient not taking: Reported on 6/26/2023) 20 tablet 0     No current facility-administered medications for this visit          Allergies:    No Known Allergies    Family History:     Family History   Problem Relation Age of Onset   • Hypertension Mother    • Hypertension Father    • Cancer Maternal Uncle         throat   • Breast cancer Neg Hx    • Colon cancer Neg Hx    • Ovarian cancer Neg Hx        Social History:       Social History     Socioeconomic History   • Marital status: Single     Spouse name: Not on file   • Number of children: Not on file   • Years of education: Not on file   • Highest education level: Not on file   Occupational History   • Not on file   Tobacco Use   • Smoking status: Former     Types: Cigars, Cigarettes     Quit date: 2020     Years since quitting: 3 4   • Smokeless tobacco: Never   • Tobacco comments:     Uses "hooka now   Vaping Use   • Vaping Use: Never used   Substance and Sexual Activity   • Alcohol use: Yes     Comment: socially   • Drug use: Yes     Types: Marijuana     Comment: daily marijuana use   • Sexual activity: Not Currently     Partners: Male     Birth control/protection: Female Sterilization   Other Topics Concern   • Not on file   Social History Narrative   • Not on file     Social Determinants of Health     Financial Resource Strain: Low Risk  (6/26/2023)    Overall Financial Resource Strain (CARDIA)    • Difficulty of Paying Living Expenses: Not hard at all   Food Insecurity: No Food Insecurity (6/26/2023)    Hunger Vital Sign    • Worried About Running Out of Food in the Last Year: Never true    • Ran Out of Food in the Last Year: Never true   Transportation Needs: No Transportation Needs (6/26/2023)    PRAPARE - Transportation    • Lack of Transportation (Medical): No    • Lack of Transportation (Non-Medical): No   Physical Activity: Not on file   Stress: Not on file   Social Connections: Not on file   Intimate Partner Violence: Not on file   Housing Stability: Low Risk  (1/7/2022)    Housing Stability Vital Sign    • Unable to Pay for Housing in the Last Year: No    • Number of Places Lived in the Last Year: 1    • Unstable Housing in the Last Year: No         Objective:   Physical Exam:                                                               Vitals:            Constitutional:  /74 (BP Location: Left arm, Patient Position: Sitting, Cuff Size: Adult)   Pulse 78   Temp 98 °F (36 7 °C) (Temporal)   Ht 5' 3\" (1 6 m)   Wt 84 1 kg (185 lb 6 4 oz)   LMP 06/11/2023   SpO2 99%   BMI 32 84 kg/m²   BP Readings from Last 3 Encounters:   06/28/23 116/74   06/26/23 103/68   12/30/22 108/70     Pulse Readings from Last 3 Encounters:   06/28/23 78   06/26/23 88   12/30/22 90         Well developed, well nourished, well groomed  No dysmorphic features  HEENT:  Normocephalic atraumatic   " Oropharynx is clear and moist  No oral mucosal lesions  Chest:  Respirations regular and unlabored  Cardiovascular:  Distal extremities warm without palpable edema or tenderness, no observed significant swelling  Musculoskeletal:  (see below under neurologic exam for evaluation of motor function and gait)   Skin:  warm and dry, not diaphoretic  No apparent birthmarks or stigmata of neurocutaneous disease  Psychiatric:  Normal behavior and appropriate affect       Neurological Examination:     Mental status/cognitive function:   Orientated to time, place and person  Recent and remote memory intact  Attention span and concentration as well as fund of knowledge are appropriate for age  Normal language and spontaneous speech  Cranial Nerves:  II-visual fields full  Fundi poorly visualized due to pupillary constriction  III, IV, VI-Pupils were equal, round, and reactive to light and accomodation  Extraocular movements were full and conjugate without nystagmus  Conjugate gaze, normal smooth pursuits, normal saccades   V-facial sensation symmetric  VII-facial expression symmetric, intact forehead wrinkle, strong eye closure, symmetric smile    VIII-hearing grossly intact bilaterally   IX, X-palate elevation symmetric, no dysarthria  XI-shoulder shrug strength intact    XII-tongue protrusion midline  Motor Exam: symmetric bulk and tone throughout, no pronator drift  Power/strength 5/5 bilateral upper and lower extremities, no atrophy, fasciculations or abnormal movements noted  Sensory: grossly intact light touch in all extremities  Reflexes: brachioradialis 2+, biceps 2+, knee 2+, ankle 2+ bilaterally  No ankle clonus  Coordination: Finger nose finger intact bilaterally, no apparent dysmetria, ataxia or tremor noted  Gait: steady casual and tandem gait  Pertinent lab results:   Ferritin 6/26/23: 1     Pertinent Imaging:   -CTA head and neck 9/12/2020: No acute intracranial findings    No evidence of large vessel occlusion  I have personally reviewed imaging and radiology read  Review of Systems:   Constitutional: Negative for appetite change, fatigue and fever  HENT: Negative  Negative for hearing loss, tinnitus, trouble swallowing and voice change  Eyes: Positive for visual disturbance (floaters)  Negative for photophobia and pain  Blurry vision   Respiratory: Negative  Negative for shortness of breath  Cardiovascular: Negative  Negative for palpitations  Gastrointestinal: Negative  Negative for nausea and vomiting  Endocrine: Negative  Negative for cold intolerance  Genitourinary: Negative  Negative for dysuria, frequency and urgency  Musculoskeletal: Negative for back pain, gait problem, myalgias and neck pain  Skin: Negative  Negative for rash  Allergic/Immunologic: Negative  Neurological: Positive for dizziness and headaches  Negative for tremors, seizures, syncope, facial asymmetry, speech difficulty, weakness, light-headedness and numbness  Hematological: Negative  Does not bruise/bleed easily  Psychiatric/Behavioral: Negative  Negative for confusion, hallucinations and sleep disturbance  All other systems reviewed and are negative  I have spent 40 minutes with the patient today in which greater than 50% of this time was spent in counseling/coordination of care regarding Diagnostic results, Prognosis, Risks and benefits of tx options, Patient and family education, Risk factor reductions, Impressions, Documenting in the medical record, Reviewing / ordering tests, medicine, procedures   and Obtaining or reviewing history    I also spent 15 minutes non face to face for this patient the same day       Activity Minutes   Precharting/reviewing 10   Patient care/counseling 40   Postcharting/care coordination 5       Author:  Theressa Dubin, DO 6/28/2023 8:12 AM

## 2023-06-28 NOTE — PATIENT INSTRUCTIONS
Additional Testing:   Neurodiagnostic workup:  MRI Brain ordered  Sleep study    Referrals:  Psychology    Headache Calendar  Please maintain a headache calendar  Consider using phone applications such as Migraine German or [place] Migraine Tracker    Headache/migraine treatment:   Acute medications (for immediate treatment of a headache): It is ok to take ibuprofen, acetaminophen or naproxen (Advil, Tylenol,  Aleve, Excedrin) if they help your headaches you should limit these to No more than 2-3 times a week to avoid medication overuse/rebound headaches  Over the counter preventive supplements for headaches/migraines (if you try, try for 3 months straight)  (to take every day to help prevent headaches - not to take at the time of headache):  [x] Magnesium 400mg daily (If any diarrhea or upset stomach, decrease dose  as tolerated) - oxide or glycinate  [x] Riboflavin (Vitamin B2) 400mg daily - (FYI B2 may make your urine bright/neon yellow)    Lifestyle Recommendations:  [x] SLEEP - Maintain a regular sleep schedule: Adults need at least 7-8 hours of uninterrupted a night  Maintain good sleep hygiene:  Going to bed and waking up at consistent times, avoiding excessive daytime naps, avoiding caffeinated beverages in the evening, avoid excessive stimulation in the evening and generally using bed primarily for sleeping  One hour before bedtime would recommend turning lights down lower, decreasing your activity (may read quietly, listen to music at a low volume)  When you get into bed, should eliminate all technology (no texting, emailing, playing with your phone, iPad or tablet in bed)  [x] HYDRATION - Maintain good hydration  Drink  2L of fluid a day (4 typical small water bottles)  [x] DIET - Maintain good nutrition  In particular don't skip meals and try and eat healthy balanced meals regularly  [x] TRIGGERS - Look for other triggers and avoid them: Limit caffeine to 1-2 cups a day or less   Avoid dietary triggers that you have noticed bring on your headaches (this could include aged cheese, peanuts, MSG, aspartame and nitrates)  [x] EXERCISE - physical exercise as we all know is good for you in many ways, and not only is good for your heart, but also is beneficial for your mental health, cognitive health and  chronic pain/headaches  I would encourage at the least 5 days of physical exercise weekly for at least 30 minutes  Education and Follow-up  [x] Please call with any questions or concerns  Of course if any new concerning symptoms go to the emergency department    [x] Follow up in 4 months

## 2023-06-28 NOTE — PROGRESS NOTES
Review of Systems   Constitutional: Negative for appetite change, fatigue and fever  HENT: Negative  Negative for hearing loss, tinnitus, trouble swallowing and voice change  Eyes: Positive for visual disturbance (floaters)  Negative for photophobia and pain  Blurry vision   Respiratory: Negative  Negative for shortness of breath  Cardiovascular: Negative  Negative for palpitations  Gastrointestinal: Negative  Negative for nausea and vomiting  Endocrine: Negative  Negative for cold intolerance  Genitourinary: Negative  Negative for dysuria, frequency and urgency  Musculoskeletal: Negative for back pain, gait problem, myalgias and neck pain  Skin: Negative  Negative for rash  Allergic/Immunologic: Negative  Neurological: Positive for dizziness and headaches  Negative for tremors, seizures, syncope, facial asymmetry, speech difficulty, weakness, light-headedness and numbness  Hematological: Negative  Does not bruise/bleed easily  Psychiatric/Behavioral: Negative  Negative for confusion, hallucinations and sleep disturbance  All other systems reviewed and are negative

## 2023-07-03 ENCOUNTER — TELEPHONE (OUTPATIENT)
Dept: SLEEP CENTER | Facility: CLINIC | Age: 38
End: 2023-07-03

## 2023-07-03 NOTE — TELEPHONE ENCOUNTER
----- Message from Lamont Jeffrey MD sent at 6/30/2023  6:47 PM EDT -----  Approved    ----- Message -----  From: Frank Pathak  Sent: 6/29/2023   7:47 AM EDT  To: Sleep Medicine [facility] Provider    This diagnostic sleep study needs approval.     If approved please sign and return to clerical pool. If denied please include reasons why. Also provide alternative testing if warranted. Please sign and return to clerical pool.

## 2023-07-12 ENCOUNTER — HOSPITAL ENCOUNTER (OUTPATIENT)
Dept: INFUSION CENTER | Facility: HOSPITAL | Age: 38
Discharge: HOME | End: 2023-07-12

## 2023-07-14 ENCOUNTER — HOSPITAL ENCOUNTER (OUTPATIENT)
Dept: INFUSION CENTER | Facility: HOSPITAL | Age: 38
End: 2023-07-14
Payer: MEDICARE

## 2023-07-14 VITALS
TEMPERATURE: 98 F | SYSTOLIC BLOOD PRESSURE: 104 MMHG | DIASTOLIC BLOOD PRESSURE: 69 MMHG | RESPIRATION RATE: 18 BRPM | OXYGEN SATURATION: 99 % | HEART RATE: 82 BPM

## 2023-07-14 DIAGNOSIS — D64.9 CHRONIC ANEMIA: Primary | ICD-10-CM

## 2023-07-14 PROCEDURE — 96365 THER/PROPH/DIAG IV INF INIT: CPT

## 2023-07-14 RX ORDER — SODIUM CHLORIDE 9 MG/ML
20 INJECTION, SOLUTION INTRAVENOUS ONCE
Status: CANCELLED | OUTPATIENT
Start: 2023-07-14

## 2023-07-14 RX ORDER — SODIUM CHLORIDE 9 MG/ML
20 INJECTION, SOLUTION INTRAVENOUS ONCE
Status: COMPLETED | OUTPATIENT
Start: 2023-07-14 | End: 2023-07-14

## 2023-07-14 RX ADMIN — IRON SUCROSE 200 MG: 20 INJECTION, SOLUTION INTRAVENOUS at 11:45

## 2023-07-14 RX ADMIN — SODIUM CHLORIDE 20 ML/HR: 0.9 INJECTION, SOLUTION INTRAVENOUS at 11:45

## 2023-07-14 NOTE — PROGRESS NOTES
Patient tolerated IV venofer without issue. Educated on discharge instructions. Next appointment confirmed, AVS given.

## 2023-07-19 ENCOUNTER — HOSPITAL ENCOUNTER (OUTPATIENT)
Dept: INFUSION CENTER | Facility: HOSPITAL | Age: 38
Discharge: HOME | End: 2023-07-19
Payer: MEDICARE

## 2023-07-19 VITALS
TEMPERATURE: 98.1 F | RESPIRATION RATE: 20 BRPM | HEART RATE: 62 BPM | DIASTOLIC BLOOD PRESSURE: 67 MMHG | SYSTOLIC BLOOD PRESSURE: 99 MMHG

## 2023-07-19 DIAGNOSIS — D64.9 CHRONIC ANEMIA: Primary | ICD-10-CM

## 2023-07-19 RX ORDER — SODIUM CHLORIDE 9 MG/ML
20 INJECTION, SOLUTION INTRAVENOUS ONCE
Status: CANCELLED | OUTPATIENT
Start: 2023-07-19

## 2023-07-19 RX ORDER — SODIUM CHLORIDE 9 MG/ML
20 INJECTION, SOLUTION INTRAVENOUS ONCE
Status: COMPLETED | OUTPATIENT
Start: 2023-07-19 | End: 2023-07-19

## 2023-07-19 RX ADMIN — IRON SUCROSE 200 MG: 20 INJECTION, SOLUTION INTRAVENOUS at 09:00

## 2023-07-19 RX ADMIN — SODIUM CHLORIDE 20 ML/HR: 0.9 INJECTION, SOLUTION INTRAVENOUS at 08:59

## 2023-07-21 ENCOUNTER — HOSPITAL ENCOUNTER (OUTPATIENT)
Dept: INFUSION CENTER | Facility: HOSPITAL | Age: 38
End: 2023-07-21
Payer: MEDICARE

## 2023-07-21 VITALS
HEART RATE: 74 BPM | SYSTOLIC BLOOD PRESSURE: 103 MMHG | DIASTOLIC BLOOD PRESSURE: 56 MMHG | RESPIRATION RATE: 18 BRPM | TEMPERATURE: 97.6 F

## 2023-07-21 DIAGNOSIS — D64.9 CHRONIC ANEMIA: Primary | ICD-10-CM

## 2023-07-21 PROCEDURE — 96365 THER/PROPH/DIAG IV INF INIT: CPT

## 2023-07-21 RX ORDER — SODIUM CHLORIDE 9 MG/ML
20 INJECTION, SOLUTION INTRAVENOUS ONCE
Status: COMPLETED | OUTPATIENT
Start: 2023-07-21 | End: 2023-07-21

## 2023-07-21 RX ORDER — SODIUM CHLORIDE 9 MG/ML
20 INJECTION, SOLUTION INTRAVENOUS ONCE
Status: CANCELLED | OUTPATIENT
Start: 2023-07-21

## 2023-07-21 RX ADMIN — IRON SUCROSE 200 MG: 20 INJECTION, SOLUTION INTRAVENOUS at 09:33

## 2023-07-21 RX ADMIN — SODIUM CHLORIDE 20 ML/HR: 9 INJECTION, SOLUTION INTRAVENOUS at 09:30

## 2023-07-21 NOTE — PLAN OF CARE
Problem: HEMATOLOGIC - ADULT  Goal: Maintains hematologic stability  Description: INTERVENTIONS  - Monitor labs  - Administer Venofer as ordered  Outcome: Progressing
home

## 2023-07-21 NOTE — PROGRESS NOTES
Venofer tolerated well without complications. No complaints offered. AVS declined. Left unit in stable condition.

## 2023-07-25 ENCOUNTER — TELEPHONE (OUTPATIENT)
Dept: NEUROLOGY | Facility: CLINIC | Age: 38
End: 2023-07-25

## 2023-07-25 NOTE — TELEPHONE ENCOUNTER
"Called back to complete peer to peer, but per Dr. Villalobos it \"fell off his list\". Awaiting determination from insurance company  "

## 2023-07-25 NOTE — TELEPHONE ENCOUNTER
received vm from 7/24 at 2:53pm-Yes, uh, this is Dr. Tucker loyd. I was trying to reach Dr. Haskins for a peer to peer. This is in regard to a sleep study that the doctor ordered for a patient. The patient's name is scar Johnson. Patients date of birth is [birth year]. If the doctor could please give me a call back. As soon as possible, my cellphone number is [phone]. This is time sensitive. Thank you.

## 2023-07-28 ENCOUNTER — HOSPITAL ENCOUNTER (OUTPATIENT)
Dept: INFUSION CENTER | Facility: HOSPITAL | Age: 38
End: 2023-07-28
Payer: MEDICARE

## 2023-07-28 VITALS
HEART RATE: 72 BPM | SYSTOLIC BLOOD PRESSURE: 105 MMHG | DIASTOLIC BLOOD PRESSURE: 70 MMHG | TEMPERATURE: 97.9 F | RESPIRATION RATE: 18 BRPM | OXYGEN SATURATION: 97 %

## 2023-07-28 DIAGNOSIS — D64.9 CHRONIC ANEMIA: Primary | ICD-10-CM

## 2023-07-28 PROCEDURE — 96365 THER/PROPH/DIAG IV INF INIT: CPT

## 2023-07-28 RX ORDER — SODIUM CHLORIDE 9 MG/ML
20 INJECTION, SOLUTION INTRAVENOUS ONCE
Status: CANCELLED | OUTPATIENT
Start: 2023-07-28

## 2023-07-28 RX ORDER — SODIUM CHLORIDE 9 MG/ML
20 INJECTION, SOLUTION INTRAVENOUS ONCE
Status: COMPLETED | OUTPATIENT
Start: 2023-07-28 | End: 2023-07-28

## 2023-07-28 RX ADMIN — SODIUM CHLORIDE 200 MG: 9 INJECTION, SOLUTION INTRAVENOUS at 08:40

## 2023-07-28 RX ADMIN — SODIUM CHLORIDE 20 ML/HR: 0.9 INJECTION, SOLUTION INTRAVENOUS at 08:36

## 2023-08-02 ENCOUNTER — HOSPITAL ENCOUNTER (OUTPATIENT)
Dept: INFUSION CENTER | Facility: HOSPITAL | Age: 38
Discharge: HOME | End: 2023-08-02
Payer: MEDICARE

## 2023-08-02 VITALS
SYSTOLIC BLOOD PRESSURE: 99 MMHG | OXYGEN SATURATION: 99 % | DIASTOLIC BLOOD PRESSURE: 66 MMHG | HEART RATE: 78 BPM | TEMPERATURE: 97.6 F | RESPIRATION RATE: 18 BRPM

## 2023-08-02 DIAGNOSIS — D64.9 CHRONIC ANEMIA: Primary | ICD-10-CM

## 2023-08-02 PROCEDURE — 96365 THER/PROPH/DIAG IV INF INIT: CPT

## 2023-08-02 RX ORDER — SODIUM CHLORIDE 9 MG/ML
20 INJECTION, SOLUTION INTRAVENOUS ONCE
Status: COMPLETED | OUTPATIENT
Start: 2023-08-02 | End: 2023-08-02

## 2023-08-02 RX ORDER — SODIUM CHLORIDE 9 MG/ML
20 INJECTION, SOLUTION INTRAVENOUS ONCE
Status: CANCELLED | OUTPATIENT
Start: 2023-08-04

## 2023-08-02 RX ADMIN — SODIUM CHLORIDE 20 ML/HR: 9 INJECTION, SOLUTION INTRAVENOUS at 09:06

## 2023-08-02 RX ADMIN — IRON SUCROSE 200 MG: 20 INJECTION, SOLUTION INTRAVENOUS at 09:06

## 2023-08-09 ENCOUNTER — HOSPITAL ENCOUNTER (OUTPATIENT)
Dept: INFUSION CENTER | Facility: HOSPITAL | Age: 38
Discharge: HOME | End: 2023-08-09
Payer: MEDICARE

## 2023-08-09 VITALS
DIASTOLIC BLOOD PRESSURE: 65 MMHG | RESPIRATION RATE: 18 BRPM | TEMPERATURE: 97.9 F | HEART RATE: 80 BPM | SYSTOLIC BLOOD PRESSURE: 93 MMHG

## 2023-08-09 DIAGNOSIS — D64.9 CHRONIC ANEMIA: Primary | ICD-10-CM

## 2023-08-09 PROCEDURE — 96365 THER/PROPH/DIAG IV INF INIT: CPT

## 2023-08-09 RX ORDER — SODIUM CHLORIDE 9 MG/ML
20 INJECTION, SOLUTION INTRAVENOUS ONCE
Status: COMPLETED | OUTPATIENT
Start: 2023-08-09 | End: 2023-08-09

## 2023-08-09 RX ORDER — SODIUM CHLORIDE 9 MG/ML
20 INJECTION, SOLUTION INTRAVENOUS ONCE
Status: CANCELLED | OUTPATIENT
Start: 2023-08-11

## 2023-08-09 RX ADMIN — IRON SUCROSE 200 MG: 20 INJECTION, SOLUTION INTRAVENOUS at 08:41

## 2023-08-09 RX ADMIN — SODIUM CHLORIDE 20 ML/HR: 0.9 INJECTION, SOLUTION INTRAVENOUS at 08:40

## 2023-08-16 ENCOUNTER — HOSPITAL ENCOUNTER (OUTPATIENT)
Dept: INFUSION CENTER | Facility: HOSPITAL | Age: 38
Discharge: HOME | End: 2023-08-16
Payer: MEDICARE

## 2023-08-16 VITALS
HEART RATE: 78 BPM | TEMPERATURE: 97.6 F | RESPIRATION RATE: 18 BRPM | DIASTOLIC BLOOD PRESSURE: 70 MMHG | SYSTOLIC BLOOD PRESSURE: 101 MMHG

## 2023-08-16 DIAGNOSIS — D64.9 CHRONIC ANEMIA: Primary | ICD-10-CM

## 2023-08-16 PROCEDURE — 96365 THER/PROPH/DIAG IV INF INIT: CPT

## 2023-08-16 RX ORDER — SODIUM CHLORIDE 9 MG/ML
20 INJECTION, SOLUTION INTRAVENOUS ONCE
Status: COMPLETED | OUTPATIENT
Start: 2023-08-16 | End: 2023-08-16

## 2023-08-16 RX ORDER — SODIUM CHLORIDE 9 MG/ML
20 INJECTION, SOLUTION INTRAVENOUS ONCE
OUTPATIENT
Start: 2023-08-18

## 2023-08-16 RX ADMIN — SODIUM CHLORIDE 20 ML/HR: 0.9 INJECTION, SOLUTION INTRAVENOUS at 08:50

## 2023-08-16 RX ADMIN — IRON SUCROSE 200 MG: 20 INJECTION, SOLUTION INTRAVENOUS at 08:56

## 2023-08-19 ENCOUNTER — APPOINTMENT (EMERGENCY)
Dept: RADIOLOGY | Facility: HOSPITAL | Age: 38
End: 2023-08-19
Payer: MEDICARE

## 2023-08-19 ENCOUNTER — HOSPITAL ENCOUNTER (EMERGENCY)
Facility: HOSPITAL | Age: 38
Discharge: HOME | End: 2023-08-19
Attending: EMERGENCY MEDICINE
Payer: MEDICARE

## 2023-08-19 ENCOUNTER — APPOINTMENT (EMERGENCY)
Dept: CT IMAGING | Facility: HOSPITAL | Age: 38
End: 2023-08-19
Payer: MEDICARE

## 2023-08-19 VITALS
BODY MASS INDEX: 33.21 KG/M2 | SYSTOLIC BLOOD PRESSURE: 143 MMHG | DIASTOLIC BLOOD PRESSURE: 82 MMHG | HEART RATE: 78 BPM | RESPIRATION RATE: 18 BRPM | TEMPERATURE: 97.9 F | WEIGHT: 187.5 LBS | OXYGEN SATURATION: 100 %

## 2023-08-19 DIAGNOSIS — S63.601A SPRAIN OF RIGHT THUMB: Primary | ICD-10-CM

## 2023-08-19 DIAGNOSIS — S39.012A STRAIN OF LUMBAR REGION, INITIAL ENCOUNTER: ICD-10-CM

## 2023-08-19 PROCEDURE — 72131 CT LUMBAR SPINE W/O DYE: CPT

## 2023-08-19 PROCEDURE — 96372 THER/PROPH/DIAG INJ SC/IM: CPT

## 2023-08-19 PROCEDURE — 73130 X-RAY EXAM OF HAND: CPT

## 2023-08-19 PROCEDURE — 99284 EMERGENCY DEPT VISIT MOD MDM: CPT | Performed by: EMERGENCY MEDICINE

## 2023-08-19 PROCEDURE — 99284 EMERGENCY DEPT VISIT MOD MDM: CPT

## 2023-08-19 RX ORDER — IBUPROFEN 800 MG/1
800 TABLET ORAL EVERY 6 HOURS PRN
Qty: 30 TABLET | Refills: 0 | Status: SHIPPED | OUTPATIENT
Start: 2023-08-19

## 2023-08-19 RX ORDER — METHOCARBAMOL 750 MG/1
1500 TABLET, FILM COATED ORAL
Qty: 20 TABLET | Refills: 0 | Status: SHIPPED | OUTPATIENT
Start: 2023-08-19

## 2023-08-19 RX ORDER — KETOROLAC TROMETHAMINE 30 MG/ML
60 INJECTION, SOLUTION INTRAMUSCULAR; INTRAVENOUS ONCE
Status: COMPLETED | OUTPATIENT
Start: 2023-08-19 | End: 2023-08-19

## 2023-08-19 RX ADMIN — KETOROLAC TROMETHAMINE 60 MG: 30 INJECTION INTRAMUSCULAR; INTRAVENOUS at 19:44

## 2023-08-19 NOTE — Clinical Note
Nydia Montesinos was seen and treated in our emergency department on 8/19/2023. Diagnosis:     Tc Lilly  may return to work on return date. She may return on this date: 08/23/2023    No heavy lifting (nothing over 15 pounds) x 2 weeks     If you have any questions or concerns, please don't hesitate to call.       Carlin Coates MD    ______________________________           _______________          _______________  Hospital Representative                              Date                                Time

## 2023-08-19 NOTE — ED PROVIDER NOTES
History  Chief Complaint   Patient presents with   • Back Pain     Back pain that began yesterday after transferring a patient at work. Patient reports lower back that radiates to her R arm. Patient took tylenol at 1400 today. Patient is a 78-year-old female. 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 Today the pain is worse. Today she had heard a pop. The pain radiates down the left leg. She experiences some numbness to the left leg. No weakness or paralysis. No incontinence. No history of cancer or IV drug use. No fever or chills. No abdominal pain. In addition she feels she might of sprained something in her right thumb at the same time. She is right-handed. Prior to Admission Medications   Prescriptions Last Dose Informant Patient Reported?  Taking?   acetaminophen (TYLENOL) 325 mg tablet  Self No No   Sig: Take 2 tablets (650 mg total) by mouth every 6 (six) hours as needed for moderate pain   Patient not taking: Reported on 2023   ibuprofen (MOTRIN) 600 mg tablet  Self No No   Sig: Take 1 tablet (600 mg total) by mouth every 6 (six) hours as needed for mild pain or moderate pain   lidocaine (Lidoderm) 5 %  Self No No   Sig: Apply 1 patch topically daily Remove & Discard patch within 12 hours or as directed by MD      Facility-Administered Medications: None       Past Medical History:   Diagnosis Date   • Abnormal Pap smear of cervix      colpo WNL;    • Anxiety     no meds since 2018   • Gonorrhea    • Heart problem 2017    swelling of L side of heart - hospitalized x7 days @[place], unsure of specifics, no issues thereafter       Past Surgical History:   Procedure Laterality Date   •  SECTION     • TUBAL LIGATION         Family History   Problem Relation Age of Onset   • Hypertension Mother    • Hypertension Father    • Cancer Maternal Uncle         throat   • Breast cancer Neg Hx • Colon cancer Neg Hx    • Ovarian cancer Neg Hx      I have reviewed and agree with the history as documented. E-Cigarette/Vaping   • E-Cigarette Use Never User      E-Cigarette/Vaping Substances   • Nicotine No    • THC No    • CBD No    • Flavoring No    • Other No    • Unknown No      Social History     Tobacco Use   • Smoking status: Former     Types: Cigars, Cigarettes     Quit date: 2020     Years since quitting: 3.6   • Smokeless tobacco: Never   • Tobacco comments:     Uses hooka now   Vaping Use   • Vaping Use: Never used   Substance Use Topics   • Alcohol use: Yes     Comment: socially   • Drug use: Yes     Types: Marijuana     Comment: daily marijuana use       Review of Systems   Constitutional: Negative for chills and fever. HENT: Negative for rhinorrhea and sore throat. Eyes: Negative for pain, redness and visual disturbance. Respiratory: Negative for cough and shortness of breath. Cardiovascular: Negative for chest pain and leg swelling. Gastrointestinal: Negative for abdominal pain, diarrhea and vomiting. Endocrine: Negative for polydipsia and polyuria. Genitourinary: Negative for dysuria, frequency, hematuria, vaginal bleeding and vaginal discharge. Musculoskeletal: Positive for back pain. Negative for neck pain. Skin: Negative for rash and wound. Allergic/Immunologic: Negative for immunocompromised state. Neurological: Negative for weakness and headaches. Hematological: Does not bruise/bleed easily. Psychiatric/Behavioral: Negative for hallucinations and suicidal ideas. All other systems reviewed and are negative. Physical Exam  Physical Exam  Vitals reviewed. Constitutional:       General: She is not in acute distress. HENT:      Head: Normocephalic and atraumatic. Nose: Nose normal.      Mouth/Throat:      Mouth: Mucous membranes are moist.   Eyes:      General:         Right eye: No discharge. Left eye: No discharge. Conjunctiva/sclera: Conjunctivae normal.   Cardiovascular:      Rate and Rhythm: Normal rate and regular rhythm. Pulses: Normal pulses. Heart sounds: Normal heart sounds. No murmur heard. No friction rub. No gallop. Pulmonary:      Effort: Pulmonary effort is normal. No respiratory distress. Breath sounds: Normal breath sounds. No stridor. No wheezing, rhonchi or rales. Abdominal:      General: Bowel sounds are normal. There is no distension. Palpations: Abdomen is soft. Tenderness: There is no abdominal tenderness. There is no right CVA tenderness, left CVA tenderness, guarding or rebound. Comments: No pulsatile mass. Musculoskeletal:         General: Tenderness present. No swelling or deformity. Cervical back: Normal range of motion and neck supple. No rigidity. Right lower leg: No edema. Left lower leg: No edema. Comments: No calf tenderness or unilateral leg swelling. There is tenderness to the lumbar spine into the left paraspinous area. There is pain with range of motion of the lower back. There is also pain to the right hand at the first metacarpal.  There is pain with range of motion. However there is full range of motion. No snuffbox tenderness. Neurovascular exam in the hand is normal.   Skin:     General: Skin is warm and dry. Coloration: Skin is not jaundiced. Findings: No rash. Neurological:      General: No focal deficit present. Mental Status: She is alert and oriented to person, place, and time. Sensory: No sensory deficit. Motor: Motor function is intact. Comments: No saddle anesthesia.    Psychiatric:         Mood and Affect: Mood normal.         Behavior: Behavior normal.         Vital Signs  ED Triage Vitals   Temperature Pulse Respirations Blood Pressure SpO2   08/19/23 1857 08/19/23 1857 08/19/23 1857 08/19/23 1857 08/19/23 1857   97.9 °F (36.6 °C) 78 18 143/82 100 %      Temp Source Heart Rate Source Patient Position - Orthostatic VS BP Location FiO2 (%)   08/19/23 1857 08/19/23 1857 08/19/23 1857 08/19/23 1857 --   Tympanic Monitor Sitting Left arm       Pain Score       08/19/23 1944       7           Vitals:    08/19/23 1857   BP: 143/82   Pulse: 78   Patient Position - Orthostatic VS: Sitting         Visual Acuity      ED Medications  Medications   ketorolac (TORADOL) injection 60 mg (60 mg Intramuscular Given 8/19/23 1944)       Diagnostic Studies  Results Reviewed     None                 CT lumbar spine without contrast   Final Result by Shira Saul DO (08/19 2115)      Mild degenerative changes in the lumbar spine. If clinically warranted MRI can be obtained for further evaluation         Workstation performed: [id number]         XR hand 3+ views RIGHT   ED Interpretation by Lou Stanton MD (08/19 2108)   No fracture or dislocation                 Procedures  Procedures         ED Course                               SBIRT 20yo+    Flowsheet Row Most Recent Value   Initial Alcohol Screen: US AUDIT-C     1. How often do you have a drink containing alcohol? 0 Filed at: 08/19/2023 1903   2. How many drinks containing alcohol do you have on a typical day you are drinking? 0 Filed at: 08/19/2023 1903   3a. Male UNDER 65: How often do you have five or more drinks on one occasion? 0 Filed at: 08/19/2023 1903   3b. FEMALE Any Age, or MALE 65+: How often do you have 4 or more drinks on one occassion? 0 Filed at: 08/19/2023 1903   Audit-C Score 0 Filed at: 08/19/2023 1903   DONNELL: How many times in the past year have you. .. Used an illegal drug or used a prescription medication for non-medical reasons? Never Filed at: 08/19/2023 1903                    Medical Decision Making  Patient likely has a right thumb sprain. X-ray was negative for fracture or dislocation. No snuffbox tenderness to suggest occult scaphoid fracture. The back is likely a lumbar strain.   Imaging was negative for fracture or subluxation. No obvious disc disease. No red flag signs or symptoms. No signs or symptoms of cauda equina syndrome or cord compression. Nothing to suggest an intra-abdominal cause of back pain such as AAA or dissection. Doubt kidney stone or gallbladder disease. This is not pancreatitis. Appropriate for discharge and outpatient management. Amount and/or Complexity of Data Reviewed  Radiology: ordered and independent interpretation performed. Decision-making details documented in ED Course. Risk  Prescription drug management. Decision regarding hospitalization. Disposition  Final diagnoses:   Sprain of right thumb   Strain of lumbar region, initial encounter     Time reflects when diagnosis was documented in both MDM as applicable and the Disposition within this note     Time User Action Codes Description Comment    8/19/2023 10:18 PM Michael Butcher Add [Q15.414P] Sprain of right thumb     8/19/2023 10:18 PM Michael Butcher Add [S39.012A] Strain of lumbar region, initial encounter       ED Disposition     ED Disposition   Discharge    Condition   Stable    Date/Time   Sat Aug 19, 2023 10:18 PM    Comment   Ofelia Dsouza discharge to home/self care.                Follow-up Information     Follow up With Specialties Details Why Contact Info Additional Information    [facility] In 1 week  [address]  [address] [facility] In 1 week  [phone]          Patient's Medications   Discharge Prescriptions    IBUPROFEN (MOTRIN) 800 MG TABLET    Take 1 tablet (800 mg total) by mouth every 6 (six) hours as needed (Pain)       Start Date: 8/19/2023 End Date: --       Order Dose: 800 mg       Quantity: 30 tablet    Refills: 0    METHOCARBAMOL (ROBAXIN) 750 MG TABLET    Take 2 tablets (1,500 mg total) by mouth daily at bedtime       Start Date: 8/19/2023 End Date: --       Order Dose: 1,500 mg       Quantity: 20 tablet    Refills: 0           PDMP Review     None          ED Provider  Electronically Signed by           Destiny Claros MD  08/19/23 2857

## 2023-08-19 NOTE — ED NOTES
Pt denies any possibility of pregnancy.  Dr. Henrik José made aware and OK to administer toradol as ordered w/o hcg testing     Sammy Woods RN  08/19/23 1950

## 2023-08-21 ENCOUNTER — TELEPHONE (OUTPATIENT)
Dept: PHYSICAL THERAPY | Facility: OTHER | Age: 38
End: 2023-08-21

## 2023-08-21 NOTE — TELEPHONE ENCOUNTER
Call placed to the patient per [facility] referral.    Voice message left for patient to call back. Phone number and hours of business provided. This is the 1st attempt to reach the patient. Will defer per protocol.     Possible WC??

## 2023-08-29 NOTE — TELEPHONE ENCOUNTER
Call placed to the patient per [facility] referral.     V/M left for patient to call back. Phone number and hours of business provided.      This is the 2nd attempt to reach the patient.   Will defer per protocol.     Possible WC??

## 2023-09-13 ENCOUNTER — OFFICE VISIT (OUTPATIENT)
Dept: FAMILY MEDICINE CLINIC | Facility: CLINIC | Age: 38
End: 2023-09-13

## 2023-09-13 VITALS
WEIGHT: 193 LBS | RESPIRATION RATE: 16 BRPM | TEMPERATURE: 97 F | BODY MASS INDEX: 34.2 KG/M2 | OXYGEN SATURATION: 99 % | DIASTOLIC BLOOD PRESSURE: 80 MMHG | HEIGHT: 63 IN | HEART RATE: 66 BPM | SYSTOLIC BLOOD PRESSURE: 120 MMHG

## 2023-09-13 DIAGNOSIS — R07.89 OTHER CHEST PAIN: ICD-10-CM

## 2023-09-13 DIAGNOSIS — F32.A ANXIETY AND DEPRESSION: ICD-10-CM

## 2023-09-13 DIAGNOSIS — E66.09 CLASS 1 OBESITY DUE TO EXCESS CALORIES WITHOUT SERIOUS COMORBIDITY WITH BODY MASS INDEX (BMI) OF 33.0 TO 33.9 IN ADULT: ICD-10-CM

## 2023-09-13 DIAGNOSIS — F41.9 ANXIETY AND DEPRESSION: ICD-10-CM

## 2023-09-13 DIAGNOSIS — S39.012A STRAIN OF LUMBAR REGION, INITIAL ENCOUNTER: ICD-10-CM

## 2023-09-13 DIAGNOSIS — G89.29 CHRONIC LOW BACK PAIN, UNSPECIFIED BACK PAIN LATERALITY, UNSPECIFIED WHETHER SCIATICA PRESENT: Primary | ICD-10-CM

## 2023-09-13 DIAGNOSIS — D64.9 CHRONIC ANEMIA: ICD-10-CM

## 2023-09-13 DIAGNOSIS — M54.50 CHRONIC LOW BACK PAIN, UNSPECIFIED BACK PAIN LATERALITY, UNSPECIFIED WHETHER SCIATICA PRESENT: Primary | ICD-10-CM

## 2023-09-13 PROCEDURE — 99215 OFFICE O/P EST HI 40 MIN: CPT | Performed by: FAMILY MEDICINE

## 2023-09-13 RX ORDER — DULOXETIN HYDROCHLORIDE 30 MG/1
30 CAPSULE, DELAYED RELEASE ORAL DAILY
Qty: 30 CAPSULE | Refills: 2 | Status: SHIPPED | OUTPATIENT
Start: 2023-09-13

## 2023-09-13 RX ORDER — HYDROXYZINE 50 MG/1
50 TABLET, FILM COATED ORAL 3 TIMES DAILY PRN
Qty: 30 TABLET | Refills: 0 | Status: SHIPPED | OUTPATIENT
Start: 2023-09-13

## 2023-09-13 RX ORDER — IBUPROFEN 800 MG/1
800 TABLET ORAL EVERY 6 HOURS PRN
Qty: 30 TABLET | Refills: 0 | Status: SHIPPED | OUTPATIENT
Start: 2023-09-13

## 2023-09-13 RX ORDER — METHOCARBAMOL 750 MG/1
1500 TABLET, FILM COATED ORAL
Qty: 20 TABLET | Refills: 0 | Status: SHIPPED | OUTPATIENT
Start: 2023-09-13

## 2023-09-13 NOTE — ASSESSMENT & PLAN NOTE
History of iron deficiency anemia  Patient is currently getting IV iron infusion  We will check iron panel and CBC

## 2023-09-13 NOTE — ASSESSMENT & PLAN NOTE
-Acute on chronic low back pain  -Symptoms started after heavy lifting at work  -Likely secondary to musculoskeletal sprain  -Patient had CT scan of her lumbar spine which showed degenerative changes  -Referral to PT  -Recommend Tylenol 1000 mg every 8, muscle relaxant, heating pad, and Motrin 800 mg (short course of NSAID), and Cymbalta

## 2023-09-13 NOTE — ASSESSMENT & PLAN NOTE
-Most likely secondary to noncardiac chest pain from her current anxiety and occasional panic attack  -Patient has had multiple cardiac work-up in the past which was unremarkable  -Provide hydroxyzine to use for acute anxiety episodes

## 2023-09-13 NOTE — PATIENT INSTRUCTIONS
Outpatient Mental Health Resources     [place] Suicide Prevention Lifeline: Dial #138  If you prefer to text, you can reach the Crisis Text Line by texting “PA” to 356312    ¿Te encuentras en crisis? Envía un mensaje de texto con la Shakir Rice al [phone] para comunicarte de manera gratuita con un Consejero de Crisis  Apoyo gratuito las 24 horas del día, los 7 rosa de la [place], al alcance de tu manoTulio Cross for mental health emergencies and/or please go to your local Emergency Department Call [phone] if you or a loved one are in a mental health crisis. You can Visit https://PredictAd/. pdf to find 24/7 crisis phone line for other counties. ETHOS   ? Location 1: [address] [place] [address] [phone]   ? Location 2: [facility] Dimitris Miranda        ? English only* Serves ages 4+          ? Accepts Medicare and some commercial plans    JANNETH   ? [address] [facility], [address] [place] [address] [phone]; [phone]  ? Bilingual English/Argentine Serves ages 5+   ? [address] [place]   ? [place] [address] [phone]   ? Bilingual English/Argentine Serves ages 16+   ? Accepts Medical Assistance, (Not currently accepting Medicare), & Major Commercial Insurances     [address] [place] [address]   ?   [address] [phone]        ? Bilingual English/Argentine Serves ages 6+   ? [address]   ? Previous [employer] location is closed  ? [place], [address]   ? Bilingual English/Argentine Serves ages 3+   ? Accepts Medical Assistance & Some Commercial Insurance     [facility]   ? [address] [facility] [address] [place] [address] [phone]   ? Bilingual English/Argentine Serves ages 5+   ?  [address]   ? [address] [facility] [address] [place] [facility] [place], [facility] [phone]  ? Bilingual English/Amharic Serves ages 3+   ? [place]     PREVENTIVE MEASURES   ? Location 1: [facility], [facility]        ? Location 2: [facility], [facility]   ? Bilingual English/Amharic Serves ages 20+  ? Accepts Medical Assistance    TEEN HELP LINE  ? [phone]-[phone]    CRIME VICTIMS [place]         ? [phone]       ? 24/7 Advocate Hotline    [place] THE [place]         ? [phone]       ? 24/7 Advocate Hotline    www. psychologytoday. [employer]APE is a resource to find psychotherapy providers, patients can filter therapist search list based on a number of criteria including language, specialty, gender, insurance, etc. Individuals seeking will need to reach out to perspective providers through information in the directory. You are encouraged to contact multiple providers to given that many providers have a significant wait list for services as well as to find a provider is a good fit for you! [address] is an organization of families, friends and individuals whose lives have been affected by mental illness. Together, we advocate for better lives for those individuals who have a m)ental illness. Visit: dominik-Reissued.org to learn more or to search for local support resources including qualified mental health providers.

## 2023-09-13 NOTE — LETTER
September 13, 2023     Patient: Shira Linda  YOB: 1985  Date of Visit: 9/13/2023      To Whom it May Concern:    Charles Degroot is under my professional care. Shelly Jaime was seen in my office on 9/13/2023. Due to patients back condition I recommend lifting restriction (<15 lbs) until further evaluation. If you have any questions or concerns, please don't hesitate to call.          Sincerely,          Curtis Jimenez MD        CC: No Recipients

## 2023-09-13 NOTE — PROGRESS NOTES
Name: Carlton Rebolledo      : 1985      MRN: [id number]  Encounter Provider: Alana Alexander MD  Encounter Date: 2023   Encounter department: [address] [facility], [place],[address]     1. Chronic low back pain, unspecified back pain laterality, unspecified whether sciatica present  Assessment & Plan:  -Acute on chronic low back pain  -Symptoms started after heavy lifting at work  -Likely secondary to musculoskeletal sprain  -Patient had CT scan of her lumbar spine which showed degenerative changes  -Referral to PT  -Recommend Tylenol 1000 mg every 8, muscle relaxant, heating pad, and Motrin 800 mg (short course of NSAID), and Cymbalta    Orders:  -     Ambulatory Referral to Physical Therapy; Future    2. Chronic anemia  Assessment & Plan:  History of iron deficiency anemia  Patient is currently getting IV iron infusion  We will check iron panel and CBC    Orders:  -     CBC and differential; Future  -     Iron Panel (Includes Ferritin, Iron Sat%, Iron, and TIBC); Future    3. Anxiety and depression  Assessment & Plan:  -History of anxiety and depression  -Her symptoms have flared up significantly recently due to multiple psychosocial stressor and chronic low back pain  -We will start Cymbalta 30 mg daily  -Provided patient with resources for mental health counseling  -Referral to  for further assistance    Orders:  -     DULoxetine (CYMBALTA) 30 mg delayed release capsule; Take 1 capsule (30 mg total) by mouth daily  -     hydrOXYzine HCL (ATARAX) 50 mg tablet; Take 1 tablet (50 mg total) by mouth 3 (three) times a day as needed for itching  -     Ambulatory Referral to Social Work Care Management Program; Future    4. Strain of lumbar region, initial encounter  -     methocarbamol (ROBAXIN) 750 mg tablet; Take 2 tablets (1,500 mg total) by mouth daily at bedtime  -     ibuprofen (MOTRIN) 800 mg tablet;  Take 1 tablet (800 mg total) by mouth every 6 (six) hours as needed (Pain)    5. Class 1 obesity due to excess calories without serious comorbidity with body mass index (BMI) of 33.0 to 33.9 in adult  -     Lipid panel; Future    6. Other chest pain  Assessment & Plan:  -Most likely secondary to noncardiac chest pain from her current anxiety and occasional panic attack  -Patient has had multiple cardiac work-up in the past which was unremarkable  -Provide hydroxyzine to use for acute anxiety episodes           Subjective      17-year-old female with a past medical history of anxiety and depression as well as iron deficiency anemia who presents today for follow-up. Patient recently went to the emergency room for back pain. She reports that she was lifting heavy patient at her job and felt a sudden onset of back pain. 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 Patient went to the emergency room and had lumbar spine CT which did not show any acute pathology. She reports that she has been experiencing persistent low back pain with associated numbness in her back and thigh area. She reports that she was given a referral to orthopedics and they told her that she has to go through [facility]. Patient reports that she has been given the run around at her job after speaking to  about her current condition. She reports that her back pain is affecting her quality of life. She reports that her anxiety and depression has been flaring up lately secondary to her chronic pain. She is currently managing pain with muscle relaxant/Motrin/NSAID with some relief in symptoms. She reports that the muscle relaxant  does make her drowsy and she needs to work and take care of her kids. She has also been experiencing sudden onset intermittent chest pain when her anxiety gets out of control. She currently does not have any chest pain.   She reports that her chest pain started a few days ago after being exposed to a foul smell at one of her clients house.  Her chest pain is not exertional or alleviated with rest.  Her pain tends to improve after she takes multiple deep breaths  She reports that she used to be on anxiety and depression medications in the past.  She also endorses passive suicidal ideation but no active plan or intent. Review of Systems   Constitutional: Negative for appetite change, chills, diaphoresis, fatigue and fever. Eyes: Negative for visual disturbance. Respiratory: Negative for shortness of breath and wheezing. Cardiovascular: Positive for chest pain. Negative for palpitations and leg swelling. Gastrointestinal: Negative for abdominal pain, nausea and vomiting. Musculoskeletal: Positive for back pain. Negative for gait problem. Skin: Negative for rash. Neurological: Positive for numbness. Negative for seizures and syncope. Hematological: Negative for adenopathy. Psychiatric/Behavioral: Positive for dysphoric mood and sleep disturbance. Negative for agitation, hallucinations and self-injury. The patient is nervous/anxious. The patient is not hyperactive.         Current Outpatient Medications on File Prior to Visit   Medication Sig   • acetaminophen (TYLENOL) 325 mg tablet Take 2 tablets (650 mg total) by mouth every 6 (six) hours as needed for moderate pain (Patient not taking: Reported on 6/28/2023)   • [DISCONTINUED] ibuprofen (MOTRIN) 600 mg tablet Take 1 tablet (600 mg total) by mouth every 6 (six) hours as needed for mild pain or moderate pain   • [DISCONTINUED] ibuprofen (MOTRIN) 800 mg tablet Take 1 tablet (800 mg total) by mouth every 6 (six) hours as needed (Pain)   • [DISCONTINUED] lidocaine (Lidoderm) 5 % Apply 1 patch topically daily Remove & Discard patch within 12 hours or as directed by MD   • [DISCONTINUED] methocarbamol (ROBAXIN) 750 mg tablet Take 2 tablets (1,500 mg total) by mouth daily at bedtime       Objective     /80 (BP Location: Left arm, Patient Position: Sitting, Cuff Size: Large) Pulse 66   Temp (!) 97 °F (36.1 °C) (Temporal)   Resp 16   Ht 5' 3" (1.6 m)   Wt 87.5 kg (193 lb)   LMP 09/08/2023 (Exact Date)   SpO2 99%   BMI 34.19 kg/m²     Physical Exam  Constitutional:       Appearance: She is well-developed. HENT:      Head: Normocephalic. Right Ear: External ear normal.      Left Ear: External ear normal.      Nose: Nose normal.   Eyes:      Pupils: Pupils are equal, round, and reactive to light. Neck:      Thyroid: No thyromegaly. Vascular: No JVD. Trachea: No tracheal deviation. Cardiovascular:      Rate and Rhythm: Normal rate and regular rhythm. Heart sounds: Normal heart sounds. No murmur heard. No friction rub. No gallop. Pulmonary:      Effort: Pulmonary effort is normal. No respiratory distress. Breath sounds: Normal breath sounds. No stridor. No wheezing. Abdominal:      General: Bowel sounds are normal. There is no distension. Palpations: Abdomen is soft. There is no mass. Tenderness: There is no abdominal tenderness. There is no guarding. Musculoskeletal:         General: Normal range of motion. Cervical back: Normal range of motion. Skin:     General: Skin is warm. Capillary Refill: Capillary refill takes less than 2 seconds. Findings: No rash. Neurological:      Mental Status: She is alert and oriented to person, place, and time. Cranial Nerves: No cranial nerve deficit. Motor: No abnormal muscle tone.       Coordination: Coordination normal.      Deep Tendon Reflexes: Reflexes normal.   Psychiatric:         Behavior: Behavior normal.       Nemesio Seay MD

## 2023-09-13 NOTE — ASSESSMENT & PLAN NOTE
-History of anxiety and depression  -Her symptoms have flared up significantly recently due to multiple psychosocial stressor and chronic low back pain  -We will start Cymbalta 30 mg daily  -Provided patient with resources for mental health counseling  -Referral to  for further assistance

## 2023-09-26 ENCOUNTER — OFFICE VISIT (OUTPATIENT)
Dept: OBGYN CLINIC | Facility: CLINIC | Age: 38
End: 2023-09-26

## 2023-09-26 VITALS
SYSTOLIC BLOOD PRESSURE: 108 MMHG | WEIGHT: 193.2 LBS | DIASTOLIC BLOOD PRESSURE: 74 MMHG | HEART RATE: 101 BPM | HEIGHT: 63 IN | BODY MASS INDEX: 34.23 KG/M2

## 2023-09-26 DIAGNOSIS — A63.0 GENITAL WARTS: Primary | ICD-10-CM

## 2023-09-26 PROCEDURE — 99213 OFFICE O/P EST LOW 20 MIN: CPT | Performed by: OBSTETRICS & GYNECOLOGY

## 2023-09-26 RX ORDER — CYCLOSPORINE 0.5 MG/ML
EMULSION OPHTHALMIC
COMMUNITY
Start: 2023-09-11

## 2023-09-26 RX ORDER — IMIQUIMOD 12.5 MG/.25G
1 CREAM TOPICAL 3 TIMES WEEKLY
Qty: 24 EACH | Refills: 6 | Status: SHIPPED | OUTPATIENT
Start: 2023-09-27

## 2023-09-26 NOTE — PROGRESS NOTES
PROBLEM GYNECOLOGICAL VISIT    Denita Jonas is a 42 y.o. female who presents today with complaint of vulvar warts. Her general medical history has been reviewed and she reports it as follows:    Past Medical History:   Diagnosis Date   • Abnormal Pap smear of cervix      colpo WNL;    • Anxiety     no meds since 2018   • Gonorrhea    • Heart problem 2017    swelling of L side of heart - hospitalized x7 days @[place], unsure of specifics, no issues thereafter     Past Surgical History:   Procedure Laterality Date   •  SECTION     • TUBAL LIGATION       OB History        5    Para   3    Term   3       0    AB   2    Living   3       SAB   0    IAB   2    Ectopic   0    Multiple   0    Live Births   3               Social History     Tobacco Use   • Smoking status: Former     Types: Cigars, Cigarettes     Quit date: 2020     Years since quitting: 3.7   • Smokeless tobacco: Never   • Tobacco comments:     Uses hooka now   Vaping Use   • Vaping Use: Never used   Substance Use Topics   • Alcohol use: Yes     Comment: socially   • Drug use: Yes     Types: Marijuana     Comment: daily marijuana use     Social History     Substance and Sexual Activity   Sexual Activity Yes   • Partners: Male   • Birth control/protection: Female Sterilization       Current Outpatient Medications   Medication Instructions   • acetaminophen (TYLENOL) 650 mg, Oral, Every 6 hours PRN   • DULoxetine (CYMBALTA) 30 mg, Oral, Daily   • hydrOXYzine HCL (ATARAX) 50 mg, Oral, 3 times daily PRN   • ibuprofen (MOTRIN) 800 mg, Oral, Every 6 hours PRN   • methocarbamol (ROBAXIN) 1,500 mg, Oral, Daily at bedtime   • Restasis 0.05 % ophthalmic emulsion No dose, route, or frequency recorded. History of Present Illness:   Patient presents with c/o noted new warts in the genital area.     Review of Systems:  Review of Systems   Genitourinary:        Genital warts   All other systems reviewed and are negative. Physical Exam:  /74   Pulse 101   Ht 5' 3" (1.6 m)   Wt 87.6 kg (193 lb 3.2 oz)   LMP 09/08/2023 (Exact Date)   BMI 34.22 kg/m²   Physical Exam  Constitutional:       Appearance: Normal appearance. Genitourinary:      Genitourinary Comments: 3 small warts noted      Right Labia: No rash, tenderness or lesions. Left Labia: No tenderness, lesions or rash. Neurological:      Mental Status: She is alert. Vitals reviewed. Assessment:   1. Genital warts    Plan:   1. Aldara cream escibed   2. Return to office 6wks. Reviewed with patient that test results are available in MyChart immediately, but that they will not necessarily be reviewed by me immediately. Explained that I will review results at my earliest opportunity and contact patient appropriately.

## 2023-09-26 NOTE — PATIENT INSTRUCTIONS
Thank you for your confidence in our team.   We appreciate you and welcome your feedback. If you receive a survey from us, please take a few moments to let us know how we are doing.    Sincerely,  Eneida Hernandez, DO

## 2023-09-27 ENCOUNTER — TELEPHONE (OUTPATIENT)
Dept: SLEEP CENTER | Facility: CLINIC | Age: 38
End: 2023-09-27

## 2023-09-27 NOTE — TELEPHONE ENCOUNTER
----- Message from Neha Mason MD sent at 9/22/2023 11:15 AM EDT -----  Approved    ----- Message -----  From: Vivian Ramirez  Sent: 9/14/2023  10:48 AM EDT  To: Sleep Medicine [facility] Provider    This diagnostic sleep study needs approval.     If approved please sign and return to clerical pool. If denied please include reasons why. Also provide alternative testing if warranted. Please sign and return to clerical pool.

## 2023-10-11 ENCOUNTER — HOSPITAL ENCOUNTER (OUTPATIENT)
Dept: RADIOLOGY | Facility: HOSPITAL | Age: 38
Discharge: HOME | End: 2023-10-11
Attending: STUDENT IN AN ORGANIZED HEALTH CARE EDUCATION/TRAINING PROGRAM

## 2023-11-11 ENCOUNTER — HOSPITAL ENCOUNTER (OUTPATIENT)
Dept: RADIOLOGY | Facility: HOSPITAL | Age: 38
Discharge: HOME | End: 2023-11-11
Attending: OBSTETRICS & GYNECOLOGY
Payer: MEDICARE

## 2023-11-11 DIAGNOSIS — N92.0 MENORRHAGIA WITH REGULAR CYCLE: ICD-10-CM

## 2023-11-11 DIAGNOSIS — D64.9 CHRONIC ANEMIA: ICD-10-CM

## 2023-11-11 PROCEDURE — 76856 US EXAM PELVIC COMPLETE: CPT

## 2023-11-11 PROCEDURE — 76830 TRANSVAGINAL US NON-OB: CPT

## 2023-12-05 ENCOUNTER — TELEPHONE (OUTPATIENT)
Dept: OBGYN CLINIC | Facility: CLINIC | Age: 38
End: 2023-12-05

## 2023-12-19 ENCOUNTER — APPOINTMENT (OUTPATIENT)
Dept: LAB | Facility: CLINIC | Age: 38
End: 2023-12-19
Payer: MEDICARE

## 2023-12-19 ENCOUNTER — OFFICE VISIT (OUTPATIENT)
Dept: OBGYN CLINIC | Facility: CLINIC | Age: 38
End: 2023-12-19

## 2023-12-19 VITALS
BODY MASS INDEX: 34.48 KG/M2 | HEART RATE: 84 BPM | HEIGHT: 63 IN | DIASTOLIC BLOOD PRESSURE: 76 MMHG | SYSTOLIC BLOOD PRESSURE: 110 MMHG | WEIGHT: 194.6 LBS

## 2023-12-19 DIAGNOSIS — D25.2 SUBSEROSAL LEIOMYOMA OF UTERUS: ICD-10-CM

## 2023-12-19 DIAGNOSIS — D64.9 CHRONIC ANEMIA: ICD-10-CM

## 2023-12-19 DIAGNOSIS — N92.0 MENORRHAGIA WITH REGULAR CYCLE: ICD-10-CM

## 2023-12-19 DIAGNOSIS — E66.09 CLASS 1 OBESITY DUE TO EXCESS CALORIES WITHOUT SERIOUS COMORBIDITY WITH BODY MASS INDEX (BMI) OF 33.0 TO 33.9 IN ADULT: ICD-10-CM

## 2023-12-19 DIAGNOSIS — A63.0 GENITAL WARTS: Primary | ICD-10-CM

## 2023-12-19 LAB
BASOPHILS # BLD AUTO: 0.03 THOUSANDS/ÂΜL (ref 0–0.1)
BASOPHILS NFR BLD AUTO: 1 % (ref 0–1)
CHOLEST SERPL-MCNC: 138 MG/DL
EOSINOPHIL # BLD AUTO: 0.19 THOUSAND/ÂΜL (ref 0–0.61)
EOSINOPHIL NFR BLD AUTO: 4 % (ref 0–6)
ERYTHROCYTE [DISTWIDTH] IN BLOOD BY AUTOMATED COUNT: 13.7 % (ref 11.6–15.1)
FERRITIN SERPL-MCNC: 4 NG/ML (ref 11–307)
HCT VFR BLD AUTO: 30.8 % (ref 34.8–46.1)
HDLC SERPL-MCNC: 53 MG/DL
HGB BLD-MCNC: 9.2 G/DL (ref 11.5–15.4)
IMM GRANULOCYTES # BLD AUTO: 0.01 THOUSAND/UL (ref 0–0.2)
IMM GRANULOCYTES NFR BLD AUTO: 0 % (ref 0–2)
IRON SERPL-MCNC: <10 UG/DL (ref 50–212)
LDLC SERPL CALC-MCNC: 62 MG/DL (ref 0–100)
LYMPHOCYTES # BLD AUTO: 1.45 THOUSANDS/ÂΜL (ref 0.6–4.47)
LYMPHOCYTES NFR BLD AUTO: 33 % (ref 14–44)
MCH RBC QN AUTO: 25.8 PG (ref 26.8–34.3)
MCHC RBC AUTO-ENTMCNC: 29.9 G/DL (ref 31.4–37.4)
MCV RBC AUTO: 87 FL (ref 82–98)
MONOCYTES # BLD AUTO: 0.28 THOUSAND/ÂΜL (ref 0.17–1.22)
MONOCYTES NFR BLD AUTO: 6 % (ref 4–12)
NEUTROPHILS # BLD AUTO: 2.41 THOUSANDS/ÂΜL (ref 1.85–7.62)
NEUTS SEG NFR BLD AUTO: 56 % (ref 43–75)
NONHDLC SERPL-MCNC: 85 MG/DL
NRBC BLD AUTO-RTO: 0 /100 WBCS
PLATELET # BLD AUTO: 373 THOUSANDS/UL (ref 149–390)
PMV BLD AUTO: 10.6 FL (ref 8.9–12.7)
RBC # BLD AUTO: 3.56 MILLION/UL (ref 3.81–5.12)
TIBC SERPL-MCNC: <326 UG/DL (ref 250–450)
TRIGL SERPL-MCNC: 117 MG/DL
UIBC SERPL-MCNC: 316 UG/DL (ref 155–355)
WBC # BLD AUTO: 4.37 THOUSAND/UL (ref 4.31–10.16)

## 2023-12-19 PROCEDURE — 83550 IRON BINDING TEST: CPT

## 2023-12-19 PROCEDURE — 83540 ASSAY OF IRON: CPT

## 2023-12-19 PROCEDURE — 99213 OFFICE O/P EST LOW 20 MIN: CPT | Performed by: OBSTETRICS & GYNECOLOGY

## 2023-12-19 PROCEDURE — 85025 COMPLETE CBC W/AUTO DIFF WBC: CPT

## 2023-12-19 PROCEDURE — 80061 LIPID PANEL: CPT

## 2023-12-19 PROCEDURE — 82728 ASSAY OF FERRITIN: CPT

## 2023-12-19 PROCEDURE — 36415 COLL VENOUS BLD VENIPUNCTURE: CPT

## 2023-12-19 NOTE — PROGRESS NOTES
PROBLEM GYNECOLOGICAL VISIT    Aleksey Johnson is a 38 y.o. female who presents today for follow up.  Her general medical history has been reviewed and she reports it as follows:    Past Medical History:   Diagnosis Date    Abnormal Pap smear of cervix      colpo WNL;     Anxiety     no meds since 2018    Gonorrhea     Heart problem 2017    swelling of L side of heart - hospitalized x7 days @[place], unsure of specifics, no issues thereafter     Past Surgical History:   Procedure Laterality Date     SECTION      TUBAL LIGATION       OB History          5    Para   3    Term   3       0    AB   2    Living   3         SAB   0    IAB   2    Ectopic   0    Multiple   0    Live Births   3               Social History     Tobacco Use    Smoking status: Former     Current packs/day: 0.00     Types: Cigars, Cigarettes     Quit date:      Years since quitting: 3.9    Smokeless tobacco: Never    Tobacco comments:     Uses hooka now   Vaping Use    Vaping status: Never Used   Substance Use Topics    Alcohol use: Yes     Comment: socially    Drug use: Yes     Types: Marijuana     Comment: daily marijuana use     Social History     Substance and Sexual Activity   Sexual Activity Not Currently    Partners: Male    Birth control/protection: Female Sterilization       Current Outpatient Medications   Medication Instructions    acetaminophen (TYLENOL) 650 mg, Oral, Every 6 hours PRN    DULoxetine (CYMBALTA) 30 mg, Oral, Daily    hydrOXYzine HCL (ATARAX) 50 mg, Oral, 3 times daily PRN    ibuprofen (MOTRIN) 800 mg, Oral, Every 6 hours PRN    imiquimod (ALDARA) 5 % cream 1 packet, Topical, 3 times weekly    methocarbamol (ROBAXIN) 1,500 mg, Oral, Daily at bedtime    Restasis 0.05 % ophthalmic emulsion No dose, route, or frequency recorded.       History of Present Illness:   Patient presents for follow up s/p aldara treatment for genital warts with c/o heavy menses and did her  "pelvic sonogram.    Review of Systems:  Review of Systems   Genitourinary:  Positive for menstrual problem.        Menorrhagia   All other systems reviewed and are negative.      Physical Exam:  /76   Pulse 84   Ht 5' 3\" (1.6 m)   Wt 88.3 kg (194 lb 9.6 oz)   LMP 12/12/2023   BMI 34.47 kg/m²   Physical Exam  Constitutional:       Appearance: Normal appearance.   Neurological:      Mental Status: She is alert.   Vitals reviewed.       Discussion:  Discuss with patient pelvic sonogram consist of intramural fibroids which most likely the cause of her heavy menses.  Recommend patient complete the blood work which was ordered.    Assessment:   1. Genital warts   2. Symptomatic fibroid uterus    Plan:   1. Continue with aldara   2. Bloodwork ordered: which was ordered in August   3. Return to office 3wks.      Reviewed with patient that [facility], but that they will not necessarily be reviewed by me immediately.  Explained that I will review results at my earliest opportunity and contact patient appropriately.  "

## 2023-12-20 ENCOUNTER — PATIENT OUTREACH (OUTPATIENT)
Dept: FAMILY MEDICINE CLINIC | Facility: CLINIC | Age: 38
End: 2023-12-20

## 2023-12-20 ENCOUNTER — TELEPHONE (OUTPATIENT)
Dept: FAMILY MEDICINE CLINIC | Facility: CLINIC | Age: 38
End: 2023-12-20

## 2023-12-20 DIAGNOSIS — E66.09 CLASS 1 OBESITY DUE TO EXCESS CALORIES WITHOUT SERIOUS COMORBIDITY WITH BODY MASS INDEX (BMI) OF 34.0 TO 34.9 IN ADULT: Primary | ICD-10-CM

## 2023-12-20 NOTE — TELEPHONE ENCOUNTER
Pt called in and I clarified the results. Pt is now asking if she may get tested for diabetes. Thank you

## 2023-12-20 NOTE — TELEPHONE ENCOUNTER
Pt asked what do she do about the 9.2 A1c she asked if she a diabetic ? What is her next steps , how many days or weeks should she do the iv infusion

## 2023-12-20 NOTE — PROGRESS NOTES
IRENECM received referral from provider to assist patient with mental health resources, dated 9/20/23. SWCM completed chart review. SWCM called patient to assist with needs. SWCM introduced self, role and reason for outreach; provided contact information.    Patient reports she was provided information for mental health resources from provider and she called a few organizations on list but was unsuccessful in obtaining counseling as no one has called due to long waiting period to establish mental health care.     IRENE provided patient with contact information to [facility] ([phone]) and [facility] ([phone]). SWCM called both organization to confirm availability and reported same to patient. Patient states she will call for intake appointment. SWCM informed patient SWCM will follow up with patient to confirm mental health services have been established. Patient expressed appreciation for support provided.     IRENECM will continue to be available to assist with needs.

## 2023-12-26 ENCOUNTER — TELEPHONE (OUTPATIENT)
Dept: FAMILY MEDICINE CLINIC | Facility: CLINIC | Age: 38
End: 2023-12-26

## 2023-12-26 NOTE — TELEPHONE ENCOUNTER
Pt stated she called to schedule her iron infusion's but she was told she needs a referral put in first

## 2024-01-10 ENCOUNTER — PATIENT OUTREACH (OUTPATIENT)
Dept: INTERNAL MEDICINE CLINIC | Facility: CLINIC | Age: 39
End: 2024-01-10

## 2024-01-10 NOTE — PROGRESS NOTES
SW called patient to follow up regarding patient securing counseling appointment. As per last outreach on 12/2/23, [facility] provided patient with contact information to [facility] ([phone]) and [facility] ([phone]). [facility] had contacted both organization to confirm availability and reported same to patient. Patient stated she would call for intake appointment. [facility] informed patient [facility] will follow up with patient to confirm mental health services have been established.     Patient reports she has not establish mental health services due to a family emergency and plans to follow up in the next week. SW informed patient [facility] will follow up with patient to assist further if needed in securing mental health services.   Patient denies other needs at this time and expressed appreciation for support provided.      SWCM encouraged patient to call with concerns/ support needs. CM will continue to be available to assist as needed.

## 2024-01-18 ENCOUNTER — OFFICE VISIT (OUTPATIENT)
Dept: OBGYN CLINIC | Facility: CLINIC | Age: 39
End: 2024-01-18

## 2024-01-18 VITALS
BODY MASS INDEX: 34.38 KG/M2 | HEART RATE: 82 BPM | HEIGHT: 63 IN | WEIGHT: 194 LBS | DIASTOLIC BLOOD PRESSURE: 76 MMHG | SYSTOLIC BLOOD PRESSURE: 112 MMHG

## 2024-01-18 DIAGNOSIS — A63.0 GENITAL WARTS: Primary | ICD-10-CM

## 2024-01-18 DIAGNOSIS — D64.9 CHRONIC ANEMIA: ICD-10-CM

## 2024-01-18 DIAGNOSIS — N93.9 ABNORMAL UTERINE BLEEDING (AUB): ICD-10-CM

## 2024-01-18 DIAGNOSIS — D25.1 INTRAMURAL UTERINE FIBROID: ICD-10-CM

## 2024-01-18 PROCEDURE — 99213 OFFICE O/P EST LOW 20 MIN: CPT | Performed by: OBSTETRICS & GYNECOLOGY

## 2024-01-18 RX ORDER — SODIUM CHLORIDE 9 MG/ML
20 INJECTION, SOLUTION INTRAVENOUS ONCE
OUTPATIENT
Start: 2024-01-29

## 2024-01-18 RX ORDER — MEDROXYPROGESTERONE ACETATE 10 MG/1
10 TABLET ORAL DAILY
Qty: 30 TABLET | Refills: 2 | Status: SHIPPED | OUTPATIENT
Start: 2024-01-18

## 2024-01-18 NOTE — PROGRESS NOTES
PROBLEM GYNECOLOGICAL VISIT    Aleksey Johnson is a 38 y.o. female who presents today for follow up.  Her general medical history has been reviewed and she reports it as follows:    Past Medical History:   Diagnosis Date    Abnormal Pap smear of cervix      colpo WNL;     Anxiety     no meds since 2018    Gonorrhea     Heart problem 2017    swelling of L side of heart - hospitalized x7 days @[place], unsure of specifics, no issues thereafter     Past Surgical History:   Procedure Laterality Date     SECTION      TUBAL LIGATION       OB History          5    Para   3    Term   3       0    AB   2    Living   3         SAB   0    IAB   2    Ectopic   0    Multiple   0    Live Births   3               Social History     Tobacco Use    Smoking status: Former     Current packs/day: 0.00     Types: Cigars, Cigarettes     Quit date:      Years since quittin.0    Smokeless tobacco: Never    Tobacco comments:     Uses hooka now   Vaping Use    Vaping status: Never Used   Substance Use Topics    Alcohol use: Yes     Comment: socially    Drug use: Yes     Types: Marijuana     Comment: daily marijuana use     Social History     Substance and Sexual Activity   Sexual Activity Not Currently    Partners: Male    Birth control/protection: Female Sterilization       Current Outpatient Medications   Medication Instructions    acetaminophen (TYLENOL) 650 mg, Oral, Every 6 hours PRN    DULoxetine (CYMBALTA) 30 mg, Oral, Daily    hydrOXYzine HCL (ATARAX) 50 mg, Oral, 3 times daily PRN    ibuprofen (MOTRIN) 800 mg, Oral, Every 6 hours PRN    imiquimod (ALDARA) 5 % cream 1 packet, Topical, 3 times weekly    methocarbamol (ROBAXIN) 1,500 mg, Oral, Daily at bedtime    Restasis 0.05 % ophthalmic emulsion No dose, route, or frequency recorded.       History of Present Illness:   Patient presents for follow up s/p aldara for genital warts with c/o that her bleeding has not gotten any  "better she was told that she would get iron transfusion but none was ordered.  Patient requesting an order for venofer.  Patient also requesting definitive treatment for her fibroids.    Review of Systems:  Review of Systems   Genitourinary:  Positive for menstrual problem.        Menorrhagia, AUB   All other systems reviewed and are negative.      Physical Exam:  /76   Pulse 82   Ht 5' 3\" (1.6 m)   Wt 88 kg (194 lb)   LMP 01/11/2024   BMI 34.37 kg/m²   Physical Exam  Constitutional:       Appearance: Normal appearance.   Neurological:      Mental Status: She is alert.   Vitals reviewed.     Discussion:  Discuss with patient will order the venofer and that she would need an EMB prior to surgery.  Recommend provera in the meantime that she is getting the venofer.     1. Symptomatic Fibroid uterus    Plan:   1. Venofer ordered   2. EMB prior to surgery   3. Provera/cytotec escribed   4. Return to office 4wks.   5. Patient's depression screening was assessed with a PHQ-2 score of 1. Their PHQ-9 score was 4. Clinically patient does not have depression. No treatment is required.     Reviewed with patient that [facility] immediately, but that they will not necessarily be reviewed by me immediately.  Explained that I will review results at my earliest opportunity and contact patient appropriately.  "

## 2024-01-21 RX ORDER — MISOPROSTOL 200 UG/1
TABLET ORAL
Qty: 2 TABLET | Refills: 0 | Status: SHIPPED | OUTPATIENT
Start: 2024-01-21

## 2024-02-03 ENCOUNTER — HOSPITAL ENCOUNTER (EMERGENCY)
Facility: HOSPITAL | Age: 39
Discharge: HOME | End: 2024-02-03
Attending: EMERGENCY MEDICINE | Admitting: EMERGENCY MEDICINE

## 2024-02-03 ENCOUNTER — APPOINTMENT (EMERGENCY)
Dept: RADIOLOGY | Facility: HOSPITAL | Age: 39
End: 2024-02-03

## 2024-02-03 VITALS
TEMPERATURE: 98.7 F | RESPIRATION RATE: 18 BRPM | HEART RATE: 98 BPM | SYSTOLIC BLOOD PRESSURE: 128 MMHG | OXYGEN SATURATION: 100 % | DIASTOLIC BLOOD PRESSURE: 65 MMHG

## 2024-02-03 DIAGNOSIS — R07.89 ATYPICAL CHEST PAIN: Primary | ICD-10-CM

## 2024-02-03 LAB
ANION GAP SERPL CALCULATED.3IONS-SCNC: 4 MMOL/L
ATRIAL RATE: 82 BPM
BASOPHILS # BLD AUTO: 0.03 THOUSANDS/ÂΜL (ref 0–0.1)
BASOPHILS NFR BLD AUTO: 0 % (ref 0–1)
BUN SERPL-MCNC: 9 MG/DL (ref 5–25)
CALCIUM SERPL-MCNC: 9.3 MG/DL (ref 8.4–10.2)
CARDIAC TROPONIN I PNL SERPL HS: <2 NG/L
CHLORIDE SERPL-SCNC: 107 MMOL/L (ref 96–108)
CO2 SERPL-SCNC: 26 MMOL/L (ref 21–32)
CREAT SERPL-MCNC: 0.7 MG/DL (ref 0.6–1.3)
D DIMER PPP FEU-MCNC: <0.27 UG/ML FEU
EOSINOPHIL # BLD AUTO: 0.09 THOUSAND/ÂΜL (ref 0–0.61)
EOSINOPHIL NFR BLD AUTO: 1 % (ref 0–6)
ERYTHROCYTE [DISTWIDTH] IN BLOOD BY AUTOMATED COUNT: 14.5 % (ref 11.6–15.1)
GFR SERPL CREATININE-BSD FRML MDRD: 108 ML/MIN/1.73SQ M
GLUCOSE SERPL-MCNC: 69 MG/DL (ref 65–140)
HCT VFR BLD AUTO: 29.9 % (ref 34.8–46.1)
HGB BLD-MCNC: 8.8 G/DL (ref 11.5–15.4)
IMM GRANULOCYTES # BLD AUTO: 0.02 THOUSAND/UL (ref 0–0.2)
IMM GRANULOCYTES NFR BLD AUTO: 0 % (ref 0–2)
LYMPHOCYTES # BLD AUTO: 2.28 THOUSANDS/ÂΜL (ref 0.6–4.47)
LYMPHOCYTES NFR BLD AUTO: 29 % (ref 14–44)
MCH RBC QN AUTO: 22.9 PG (ref 26.8–34.3)
MCHC RBC AUTO-ENTMCNC: 29.4 G/DL (ref 31.4–37.4)
MCV RBC AUTO: 78 FL (ref 82–98)
MONOCYTES # BLD AUTO: 0.5 THOUSAND/ÂΜL (ref 0.17–1.22)
MONOCYTES NFR BLD AUTO: 6 % (ref 4–12)
NEUTROPHILS # BLD AUTO: 5.09 THOUSANDS/ÂΜL (ref 1.85–7.62)
NEUTS SEG NFR BLD AUTO: 64 % (ref 43–75)
NRBC BLD AUTO-RTO: 0 /100 WBCS
P AXIS: 20 DEGREES
PLATELET # BLD AUTO: 327 THOUSANDS/UL (ref 149–390)
PMV BLD AUTO: 10.9 FL (ref 8.9–12.7)
POTASSIUM SERPL-SCNC: 3.5 MMOL/L (ref 3.5–5.3)
PR INTERVAL: 176 MS
QRS AXIS: 2 DEGREES
QRSD INTERVAL: 98 MS
QT INTERVAL: 362 MS
QTC INTERVAL: 422 MS
RBC # BLD AUTO: 3.84 MILLION/UL (ref 3.81–5.12)
SODIUM SERPL-SCNC: 137 MMOL/L (ref 135–147)
T WAVE AXIS: 25 DEGREES
VENTRICULAR RATE: 82 BPM
WBC # BLD AUTO: 8.01 THOUSAND/UL (ref 4.31–10.16)

## 2024-02-03 PROCEDURE — 93005 ELECTROCARDIOGRAM TRACING: CPT

## 2024-02-03 PROCEDURE — 84484 ASSAY OF TROPONIN QUANT: CPT

## 2024-02-03 PROCEDURE — 85025 COMPLETE CBC W/AUTO DIFF WBC: CPT

## 2024-02-03 PROCEDURE — 80048 BASIC METABOLIC PNL TOTAL CA: CPT

## 2024-02-03 PROCEDURE — 99285 EMERGENCY DEPT VISIT HI MDM: CPT | Performed by: EMERGENCY MEDICINE

## 2024-02-03 PROCEDURE — 96374 THER/PROPH/DIAG INJ IV PUSH: CPT

## 2024-02-03 PROCEDURE — 83036 HEMOGLOBIN GLYCOSYLATED A1C: CPT

## 2024-02-03 PROCEDURE — 99285 EMERGENCY DEPT VISIT HI MDM: CPT

## 2024-02-03 PROCEDURE — 85379 FIBRIN DEGRADATION QUANT: CPT

## 2024-02-03 PROCEDURE — 71045 X-RAY EXAM CHEST 1 VIEW: CPT

## 2024-02-03 PROCEDURE — 36415 COLL VENOUS BLD VENIPUNCTURE: CPT

## 2024-02-03 RX ORDER — KETOROLAC TROMETHAMINE 30 MG/ML
15 INJECTION, SOLUTION INTRAMUSCULAR; INTRAVENOUS ONCE
Status: COMPLETED | OUTPATIENT
Start: 2024-02-03 | End: 2024-02-03

## 2024-02-03 RX ORDER — ACETAMINOPHEN 325 MG/1
650 TABLET ORAL ONCE
Status: COMPLETED | OUTPATIENT
Start: 2024-02-03 | End: 2024-02-03

## 2024-02-03 RX ORDER — SODIUM CHLORIDE 9 MG/ML
3 INJECTION INTRAVENOUS
Status: DISCONTINUED | OUTPATIENT
Start: 2024-02-03 | End: 2024-02-04 | Stop reason: HOSPADM

## 2024-02-03 RX ADMIN — ACETAMINOPHEN 650 MG: 325 TABLET, FILM COATED ORAL at 23:04

## 2024-02-03 RX ADMIN — KETOROLAC TROMETHAMINE 15 MG: 30 INJECTION, SOLUTION INTRAMUSCULAR; INTRAVENOUS at 21:35

## 2024-02-03 NOTE — Clinical Note
Aleksey Johnson was seen and treated in our emergency department on 2/3/2024.                Diagnosis: Chest pain    Aleksey  may return to work on return date, is off the rest of the shift today.    She may return on this date: 02/05/2024         If you have any questions or concerns, please don't hesitate to call.      Dwight Adams, DO    ______________________________           _______________          _______________  Hospital Representative                              Date                                Time

## 2024-02-04 LAB
EST. AVERAGE GLUCOSE BLD GHB EST-MCNC: 126 MG/DL
HBA1C MFR BLD: 6 %

## 2024-02-04 NOTE — DISCHARGE INSTRUCTIONS
You were evaluated in our emergency department today for chest pain.  Your heart enzymes were normal.  Follow up with cardiology.  Return to the emergency department for reevaluation if your chest pain worsens or changes in any way, or if you feel generally worse.

## 2024-02-04 NOTE — ED PROVIDER NOTES
"History  Chief Complaint   Patient presents with    Chest Pain     PT c/o chest pain and SOB that started two days ago. PT also states she is having pain in her left arm. PT states she has personal and family hx of cardiac disease     38-year-old female presents ED for evaluation of chest pain beginning yesterday.  Patient states that she has had a waxing and waning chest pain which is substernal.  The pain radiates to her left arm and back.  It is described as sharp, and currently 6 out of 10.  It has not improved nor worsened by anything.  Patient states that she took ASA 81 mg x2 prior to arrival today.  Patient states that she has associated nausea, but no vomiting.  She also states that she has mild shortness of breath and intermittent lightheadedness.  Patient states that she has had a similar episode in the past.  She states that she was hospitalized several years ago and had a cardiac workup, but \"was not diagnosed with anything.\"  Patient with former smoking history.  She denies recent travel, recent surgeries.        Prior to Admission Medications   Prescriptions Last Dose Informant Patient Reported? Taking?   DULoxetine (CYMBALTA) 30 mg delayed release capsule   No No   Sig: Take 1 capsule (30 mg total) by mouth daily   Patient not taking: Reported on 12/19/2023   Restasis 0.05 % ophthalmic emulsion   Yes No   Patient not taking: Reported on 1/18/2024   acetaminophen (TYLENOL) 325 mg tablet  Self No No   Sig: Take 2 tablets (650 mg total) by mouth every 6 (six) hours as needed for moderate pain   hydrOXYzine HCL (ATARAX) 50 mg tablet   No No   Sig: Take 1 tablet (50 mg total) by mouth 3 (three) times a day as needed for itching   Patient not taking: Reported on 12/19/2023   ibuprofen (MOTRIN) 800 mg tablet   No No   Sig: Take 1 tablet (800 mg total) by mouth every 6 (six) hours as needed (Pain)   imiquimod (ALDARA) 5 % cream   No No   Sig: Apply 1 packet topically 3 (three) times a week "   medroxyPROGESTERone (PROVERA) 10 mg tablet   No No   Sig: Take 1 tablet (10 mg total) by mouth daily   methocarbamol (ROBAXIN) 750 mg tablet   No No   Sig: Take 2 tablets (1,500 mg total) by mouth daily at bedtime   Patient not taking: Reported on 2023   miSOPROStol (Cytotec) 200 mcg tablet   No No   Sig: Take one tablet orally the night prior to procedure then Take one tablet insert intravaginally the morning of procedure at 5:30am      Facility-Administered Medications: None       Past Medical History:   Diagnosis Date    Abnormal Pap smear of cervix      colpo WNL;     Anxiety     no meds since 2018    Gonorrhea     Heart problem 2017    swelling of L side of heart - hospitalized x7 days @[facility], unsure of specifics, no issues thereafter       Past Surgical History:   Procedure Laterality Date     SECTION      TUBAL LIGATION         Family History   Problem Relation Age of Onset    Hypertension Mother     Hypertension Father     Cancer Maternal Uncle         throat    Breast cancer Neg Hx     Colon cancer Neg Hx     Ovarian cancer Neg Hx      I have reviewed and agree with the history as documented.    E-Cigarette/Vaping    E-Cigarette Use Never User      E-Cigarette/Vaping Substances    Nicotine No     THC No     CBD No     Flavoring No     Other No     Unknown No      Social History     Tobacco Use    Smoking status: Former     Current packs/day: 0.00     Types: Cigars, Cigarettes     Quit date: 2020     Years since quittin.0    Smokeless tobacco: Never    Tobacco comments:     Uses hooka now   Vaping Use    Vaping status: Never Used   Substance Use Topics    Alcohol use: Yes     Comment: socially    Drug use: Yes     Types: Marijuana     Comment: daily marijuana use        Review of Systems   Constitutional:  Negative for chills and fever.   HENT:  Negative for congestion, rhinorrhea and sore throat.    Respiratory:  Negative for cough, shortness of breath and  wheezing.    Cardiovascular:  Positive for chest pain. Negative for palpitations.   Gastrointestinal:  Negative for abdominal pain, diarrhea, nausea and vomiting.   Genitourinary:  Negative for difficulty urinating.   Musculoskeletal:  Negative for back pain and neck pain.   Skin:  Negative for color change.   Neurological:  Negative for dizziness, syncope, weakness, light-headedness, numbness and headaches.       Physical Exam  ED Triage Vitals   Temperature Pulse Respirations Blood Pressure SpO2   02/03/24 2025 02/03/24 2026 02/03/24 2026 02/03/24 2027 02/03/24 2026   98.7 °F (37.1 °C) 88 18 106/76 100 %      Temp src Heart Rate Source Patient Position - Orthostatic VS BP Location FiO2 (%)   -- 02/03/24 2307 02/03/24 2026 02/03/24 2026 --    Monitor Sitting Right arm       Pain Score       02/03/24 2026       5             Orthostatic Vital Signs  Vitals:    02/03/24 2026 02/03/24 2027 02/03/24 2307   BP:  106/76 128/65   Pulse: 88  98   Patient Position - Orthostatic VS: Sitting         Physical Exam  Vitals and nursing note reviewed.   Constitutional:       General: She is not in acute distress.     Appearance: She is well-developed. She is obese. She is not ill-appearing, toxic-appearing or diaphoretic.   HENT:      Head: Normocephalic and atraumatic.   Eyes:      Extraocular Movements: Extraocular movements intact.      Pupils: Pupils are equal, round, and reactive to light.   Cardiovascular:      Rate and Rhythm: Normal rate and regular rhythm.      Pulses:           Radial pulses are 2+ on the right side and 2+ on the left side.      Heart sounds: Normal heart sounds.   Pulmonary:      Effort: Pulmonary effort is normal. No respiratory distress.      Breath sounds: Normal breath sounds.   Abdominal:      Palpations: Abdomen is soft.      Tenderness: There is no abdominal tenderness.   Musculoskeletal:      Cervical back: Neck supple.      Right lower leg: No tenderness. No edema.      Left lower leg: No  tenderness. No edema.   Skin:     General: Skin is warm and dry.      Capillary Refill: Capillary refill takes less than 2 seconds.   Neurological:      Mental Status: She is alert and oriented to person, place, and time.         ED Medications  Medications   ketorolac (TORADOL) injection 15 mg (15 mg Intravenous Given 2/3/24 2135)   acetaminophen (TYLENOL) tablet 650 mg (650 mg Oral Given 2/3/24 2304)       Diagnostic Studies  Results Reviewed       Procedure Component Value Units Date/Time    Hemoglobin A1C [[id number]]  (Abnormal) Collected: 02/03/24 2139    Lab Status: Final result Specimen: Blood from Arm, Right Updated: 02/04/24 0153     Hemoglobin A1C 6.0 %       mg/dl     HS Troponin 0hr (reflex protocol) [[id number]]  (Normal) Collected: 02/03/24 2139    Lab Status: Final result Specimen: Blood from Arm, Right Updated: 02/03/24 2215     hs TnI 0hr <2 ng/L     Basic metabolic panel [[id number]] Collected: 02/03/24 2139    Lab Status: Final result Specimen: Blood from Arm, Right Updated: 02/03/24 2213     Sodium 137 mmol/L      Potassium 3.5 mmol/L      Chloride 107 mmol/L      CO2 26 mmol/L      ANION GAP 4 mmol/L      BUN 9 mg/dL      Creatinine 0.70 mg/dL      Glucose 69 mg/dL      Calcium 9.3 mg/dL      eGFR 108 ml/min/1.73sq m     Narrative:      National Kidney Disease Foundation guidelines for Chronic Kidney Disease (CKD):     Stage 1 with normal or high GFR (GFR > 90 mL/min/1.73 square meters)    Stage 2 Mild CKD (GFR = 60-89 mL/min/1.73 square meters)    Stage 3A Moderate CKD (GFR = 45-59 mL/min/1.73 square meters)    Stage 3B Moderate CKD (GFR = 30-44 mL/min/1.73 square meters)    Stage 4 Severe CKD (GFR = 15-29 mL/min/1.73 square meters)    Stage 5 End Stage CKD (GFR <15 mL/min/1.73 square meters)  Note: GFR calculation is accurate only with a steady state creatinine    D-dimer, quantitative [[id number]]  (Normal) Collected: 02/03/24 2139    Lab Status: Final result Specimen: Blood from Arm,  Right Updated: 02/03/24 2212     D-Dimer, Quant <0.27 ug/ml FEU     CBC and differential [[id number]]  (Abnormal) Collected: 02/03/24 2139    Lab Status: Final result Specimen: Blood from Arm, Right Updated: 02/03/24 2147     WBC 8.01 Thousand/uL      RBC 3.84 Million/uL      Hemoglobin 8.8 g/dL      Hematocrit 29.9 %      MCV 78 fL      MCH 22.9 pg      MCHC 29.4 g/dL      RDW 14.5 %      MPV 10.9 fL      Platelets 327 Thousands/uL      nRBC 0 /100 WBCs      Neutrophils Relative 64 %      Immat GRANS % 0 %      Lymphocytes Relative 29 %      Monocytes Relative 6 %      Eosinophils Relative 1 %      Basophils Relative 0 %      Neutrophils Absolute 5.09 Thousands/µL      Immature Grans Absolute 0.02 Thousand/uL      Lymphocytes Absolute 2.28 Thousands/µL      Monocytes Absolute 0.50 Thousand/µL      Eosinophils Absolute 0.09 Thousand/µL      Basophils Absolute 0.03 Thousands/µL                    X-ray chest 1 view portable    (Results Pending)         Procedures  ECG 12 Lead Documentation Only    Date/Time: 2/3/2024 9:06 PM    Performed by: Dwight Adams DO  Authorized by: Dwight Adams DO    ECG reviewed by me, the ED Provider: yes    Patient location:  ED  Previous ECG:     Previous ECG:  Compared to current    Similarity:  No change    Comparison to cardiac monitor: Yes    Interpretation:     Interpretation: non-specific    Rate:     ECG rate:  80    ECG rate assessment: normal    Ectopy:     Ectopy: none    QRS:     QRS axis:  Normal    QRS intervals:  Normal  Conduction:     Conduction: normal    ST segments:     ST segments:  Normal  T waves:     T waves: non-specific    Comments:      No STEMI        ED Course  ED Course as of 02/04/24 0352   Sat Feb 03, 2024 2221 hs TnI 0hr: <2   2221 D-Dimer, Quant: <0.27   2231 Basic metabolic panel  Within normal limits             HEART Risk Score      Flowsheet Row Most Recent Value   Heart Score Risk Calculator    History 1 Filed at: 02/03/2024 2232   ECG 1 Filed  at: 02/03/2024 2232   Age 0 Filed at: 02/03/2024 2232   Risk Factors 1 Filed at: 02/03/2024 2232   Troponin 0 Filed at: 02/03/2024 2232   HEART Score 3 Filed at: 02/03/2024 2232                PERC Rule for PE      Flowsheet Row Most Recent Value   PERC Rule for PE    Age >=50 0 Filed at: 02/03/2024 2232   HR >=100 0 Filed at: 02/03/2024 2232   O2 Sat on room air < 95% 0 Filed at: 02/03/2024 2232   History of PE or DVT 0 Filed at: 02/03/2024 2232   Recent trauma or surgery 0 Filed at: 02/03/2024 2232   Hemoptysis 0 Filed at: 02/03/2024 2232   Exogenous estrogen 0 Filed at: 02/03/2024 2232   Unilateral leg swelling 0 Filed at: 02/03/2024 2232   PERC Rule for PE Results 0 Filed at: 02/03/2024 2232                          Medical Decision Making  Aleksey Johnson is a 38 y.o. female who presents to ED for evaluation of chest pain beginning yesterday    Physical exam: Vitals stable.  Patient in no acute distress.  Heart regular rate and rhythm.  Lungs clear to auscultation bilaterally.  Abdomen soft nontender nondistended.    Differential diagnoses include: Rule out ACS, musculoskeletal pain, doubt pneumonia, doubt pneumothorax, I doubt PE    Workup includes: EKG, chest x-ray, CBC, CMP, troponin, D-dimer.  Will treat with Toradol for pain.  Workup negative for acute pathology  PERC negative  Heart score 3.  Plan to discharge with cardiology follow-up.  Strict return precautions discussed with patient.  Patient states she understands.    Please see ED Course for additional information.  \    Amount and/or Complexity of Data Reviewed  Labs: ordered. Decision-making details documented in ED Course.  Radiology: ordered.    Risk  OTC drugs.  Prescription drug management.          Disposition  Final diagnoses:   Atypical chest pain     Time reflects when diagnosis was documented in both MDM as applicable and the Disposition within this note       Time User Action Codes Description Comment    2/3/2024 10:35 PM Bryan  Dwight Cecil [R07.89] Atypical chest pain           ED Disposition       ED Disposition   Discharge    Condition   Stable    Date/Time   Sat Feb 3, 2024 2257    Comment   Aleksey Johnson discharge to home/self care.                   Follow-up Information       Follow up With Specialties Details Why Contact Info Additional Information    [facility]iology [facility] Schedule an appointment as soon as possible for a visit   [address] [facility] [address] [phone] [facility], [address], [place], [place], [id number] [phone]            Discharge Medication List as of 2/3/2024 11:05 PM        CONTINUE these medications which have NOT CHANGED    Details   acetaminophen (TYLENOL) 325 mg tablet Take 2 tablets (650 mg total) by mouth every 6 (six) hours as needed for moderate pain, Starting Wed 12/14/2022, Print      DULoxetine (CYMBALTA) 30 mg delayed release capsule Take 1 capsule (30 mg total) by mouth daily, Starting Wed 9/13/2023, Normal      hydrOXYzine HCL (ATARAX) 50 mg tablet Take 1 tablet (50 mg total) by mouth 3 (three) times a day as needed for itching, Starting Wed 9/13/2023, Normal      ibuprofen (MOTRIN) 800 mg tablet Take 1 tablet (800 mg total) by mouth every 6 (six) hours as needed (Pain), Starting Wed 9/13/2023, Normal      imiquimod (ALDARA) 5 % cream Apply 1 packet topically 3 (three) times a week, Starting Wed 9/27/2023, Normal      medroxyPROGESTERone (PROVERA) 10 mg tablet Take 1 tablet (10 mg total) by mouth daily, Starting Thu 1/18/2024, Normal      methocarbamol (ROBAXIN) 750 mg tablet Take 2 tablets (1,500 mg total) by mouth daily at bedtime, Starting Wed 9/13/2023, Normal      miSOPROStol (Cytotec) 200 mcg tablet Take one tablet orally the night prior to procedure then Take one tablet insert intravaginally the morning of procedure at 5:30am, Normal      Restasis 0.05 % ophthalmic emulsion Starting  Mon 9/11/2023, Historical Med           No discharge procedures on file.    PDMP Review       None             ED Provider  Attending physically available and evaluated Aleksey Johnson. I managed the patient along with the ED Attending.    Electronically Signed by           Dwight Adams DO  02/04/24 0352

## 2024-02-05 DIAGNOSIS — D64.9 CHRONIC ANEMIA: Primary | ICD-10-CM

## 2024-02-05 NOTE — ED ATTENDING ATTESTATION
"2/3/2024   I, Nusrat Hall MD, saw and evaluated the patient. I have discussed the patient with the resident/non-physician practitioner and agree with the resident's/non-physician practitioner's findings, Plan of Care, and MDM as documented in the resident's/non-physician practitioner's note, except where noted. All available labs and Radiology studies were reviewed.  I was present for key portions of any procedure(s) performed by the resident/non-physician practitioner and I was immediately available to provide assistance.       At this point I agree with the current assessment done in the Emergency Department.  I have conducted an independent evaluation of this patient a history and physical is as follows:    Unit/Bed#: ED 15 Encounter: [id number]    Chief Complaint   Patient presents with    Chest Pain     PT c/o chest pain and SOB that started two days ago. PT also states she is having pain in her left arm. PT states she has personal and family hx of cardiac disease     38 y.o. female presenting with chest pain. Symptoms started yesterday in central chest, pinching and pressure like, associated with inability to take a full breath and with radiation of pain to left arm. Does not appear to be exertional. When pain persisted and worsened today, patient took 2 x ASA 81mg and sought medical attention. She does have nausea, no other symptoms. No cough, no fevers, no V/D, no diarrhea.     Personal risk factors for cardiac disease include history of smoking. Patient has family history of cardiac disease.     Had a prior episode of chest pain that was evaluated with echo and a stress test in 2020, echo was normal, stress test 1/21/2020: \"No ischemic changes on EKG after submaximal exercise with reproduction of symptoms. Diagnostic sensitivity was limited due to significant baseline artifact and submaximal exercise. Diagnostic sensitivity was limited by  submaximal stress.\"     Physical Exam  ED Triage Vitals "   Temperature Pulse Respirations Blood Pressure SpO2   02/03/24 2025 02/03/24 2026 02/03/24 2026 02/03/24 2027 02/03/24 2026   98.7 °F (37.1 °C) 88 18 106/76 100 %      Temp src Heart Rate Source Patient Position - Orthostatic VS BP Location FiO2 (%)   -- 02/03/24 2307 02/03/24 2026 02/03/24 2026 --    Monitor Sitting Right arm       Pain Score       02/03/24 2026       5           Vital signs and nursing notes reviewed    CONSTITUTIONAL: female appearing stated age resting in bed, in no acute distress  HEENT: atraumatic, normocephalic. Sclera anicteric, conjunctiva are not injected. Moist oral mucosa  CARDIOVASCULAR/CHEST: RRR, no M/R/G. 2+ radial pulses and symmetric  PULMONARY: Breathing comfortably on RA. Breath sounds are equal and clear to auscultation  ABDOMEN: non-distended. BS present, normoactive. Non-tender  MSK: moves all extremities, no deformities, no peripheral edema, no calf asymmetry  NEURO: Awake, alert, and oriented x 3. Face symmetric. Moves all extremities spontaneously. No focal neurologic deficits  SKIN: Warm, appears well-perfused  MENTAL STATUS: Normal affect      Labs and Imaging  Labs Reviewed   CBC AND DIFFERENTIAL - Abnormal       Result Value Ref Range Status    WBC 8.01  4.31 - 10.16 Thousand/uL Final    RBC 3.84  3.81 - 5.12 Million/uL Final    Hemoglobin 8.8 (*) 11.5 - 15.4 g/dL Final    Hematocrit 29.9 (*) 34.8 - 46.1 % Final    MCV 78 (*) 82 - 98 fL Final    MCH 22.9 (*) 26.8 - 34.3 pg Final    MCHC 29.4 (*) 31.4 - 37.4 g/dL Final    RDW 14.5  11.6 - 15.1 % Final    MPV 10.9  8.9 - 12.7 fL Final    Platelets 327  149 - 390 Thousands/uL Final    nRBC 0  /100 WBCs Final    Neutrophils Relative 64  43 - 75 % Final    Immat GRANS % 0  0 - 2 % Final    Lymphocytes Relative 29  14 - 44 % Final    Monocytes Relative 6  4 - 12 % Final    Eosinophils Relative 1  0 - 6 % Final    Basophils Relative 0  0 - 1 % Final    Neutrophils Absolute 5.09  1.85 - 7.62 Thousands/µL Final    Immature  "Grans Absolute 0.02  0.00 - 0.20 Thousand/uL Final    Lymphocytes Absolute 2.28  0.60 - 4.47 Thousands/µL Final    Monocytes Absolute 0.50  0.17 - 1.22 Thousand/µL Final    Eosinophils Absolute 0.09  0.00 - 0.61 Thousand/µL Final    Basophils Absolute 0.03  0.00 - 0.10 Thousands/µL Final   HS TROPONIN I 0HR - Normal    hs TnI 0hr <2  \"Refer to ACS Flowchart\"- see link ng/L Final    Comment:                                              Initial (time 0) result  If >=50 ng/L, Myocardial injury suggested ;  Type of myocardial injury and treatment strategy  to be determined.  If 5-49 ng/L, a delta result at 2 hours and or 4 hours will be needed to further evaluate.  If <4 ng/L, and chest pain has been >3 hours since onset, patient may qualify for discharge based on the HEART score in the ED.  If <5 ng/L and <3hours since onset of chest pain, a delta result at 2 hours will be needed to further evaluate.    HS Troponin 99th Percentile URL of a Health Population=12 ng/L with a 95% Confidence Interval of 8-18 ng/L.    Second Troponin (time 2 hours)  If calculated delta >= 20 ng/L,  Myocardial injury suggested ; Type of myocardial injury and treatment strategy to be determined.  If 5-49 ng/L and the calculated delta is 5-19 ng/L, consult medical service for evaluation.  Continue evaluation for ischemia on ecg and other possible etiology and repeat hs troponin at 4 hours.  If delta is <5 ng/L at 2 hours, consider discharge based on risk stratification via the HEART score (if in ED), or RACIEL risk score in IP/Observation.    HS Troponin 99th Percentile URL of a Health Population=12 ng/L with a 95% Confidence Interval of 8-18 ng/L.   D-DIMER, QUANTITATIVE - Normal    D-Dimer, Quant <0.27  <0.50 ug/ml FEU Final    Comment: Reference and upper limits to exclude DVT and PE are the same.  Do not use to exclude if clinical symptoms are present.  Pregnant women:  1st trimester:  <0.22 - 1.06 ug/ml FEU  2nd trimester:  <0.22 - 1.88 " ug/ml FEU  3rd trimester:   0.24 - 3.28 ug/ml FEU    Note: Normal ranges may not apply to patients who are transgender, non-binary, or whose legal sex, sex at birth, and gender identity differ.     BASIC METABOLIC PANEL    Sodium 137  135 - 147 mmol/L Final    Potassium 3.5  3.5 - 5.3 mmol/L Final    Chloride 107  96 - 108 mmol/L Final    CO2 26  21 - 32 mmol/L Final    ANION GAP 4  mmol/L Final    BUN 9  5 - 25 mg/dL Final    Creatinine 0.70  0.60 - 1.30 mg/dL Final    Comment: Standardized to IDMS reference method    Glucose 69  65 - 140 mg/dL Final    Comment: If the patient is fasting, the ADA then defines impaired fasting glucose as > 100 mg/dL and diabetes as > or equal to 123 mg/dL.    Calcium 9.3  8.4 - 10.2 mg/dL Final    eGFR 108  ml/min/1.73sq m Final    Narrative:     National Kidney Disease Foundation guidelines for Chronic Kidney Disease (CKD):     Stage 1 with normal or high GFR (GFR > 90 mL/min/1.73 square meters)    Stage 2 Mild CKD (GFR = 60-89 mL/min/1.73 square meters)    Stage 3A Moderate CKD (GFR = 45-59 mL/min/1.73 square meters)    Stage 3B Moderate CKD (GFR = 30-44 mL/min/1.73 square meters)    Stage 4 Severe CKD (GFR = 15-29 mL/min/1.73 square meters)    Stage 5 End Stage CKD (GFR <15 mL/min/1.73 square meters)  Note: GFR calculation is accurate only with a steady state creatinine       X-ray chest 1 view portable   Final Result      No acute cardiopulmonary disease.            Workstation performed: [id number]             HEART Risk Score      Flowsheet Row Most Recent Value   Heart Score Risk Calculator    History 1 Filed at: 02/03/2024 2232   ECG 1 Filed at: 02/03/2024 2232   Age 0 Filed at: 02/03/2024 2232   Risk Factors 1 Filed at: 02/03/2024 2232   Troponin 0 Filed at: 02/03/2024 2232   HEART Score 3 Filed at: 02/03/2024 2232              Procedures  ECG 12 Lead Documentation Only    Date/Time: 2/3/2024 9:14 PM    Performed by: Nusrat Hall MD  Authorized by: Nusrat Hall MD     Comments:      Back and shoulder pain, normal sinus rhythm, ventricular rate 82, cures of 176, QRS 90, QTc 422, normal axis, no ST/T wave changes to suggest ischemia, no STEMI.  Overall, no significant change from prior EKG dated 11/22/2022.          ED Course  Medications   ketorolac (TORADOL) injection 15 mg (15 mg Intravenous Given 2/3/24 2135)   acetaminophen (TYLENOL) tablet 650 mg (650 mg Oral Given 2/3/24 2304)        38 y.o. female presenting with chest pain. Vital Signs reviewed.  Differential diagnosis includes acute coronary syndrome, pericarditis, myocarditis, PE, pneumonia, pneumothorax, dissection, pleurisy, musculoskeletal pain, versus another etiology of symptoms.  EKG obtained, to my interpretation as above.  Data collected so far do not support ACS, PE, pneumonia, PNX.  Patient treated symptomatically.  Recommend close follow-up with cardiology for reevaluation given risk factors for underlying coronary artery disease.  Patient may take over-the-counter analgesics such as Tylenol and/or ibuprofen to help with the chest discomfort at present.  Return to emergency department if symptoms are worsening or new symptoms develop.

## 2024-02-06 ENCOUNTER — PATIENT OUTREACH (OUTPATIENT)
Dept: INTERNAL MEDICINE CLINIC | Facility: CLINIC | Age: 39
End: 2024-02-06

## 2024-02-06 LAB
ATRIAL RATE: 82 BPM
P AXIS: 20 DEGREES
PR INTERVAL: 176 MS
QRS AXIS: 2 DEGREES
QRSD INTERVAL: 98 MS
QT INTERVAL: 362 MS
QTC INTERVAL: 422 MS
T WAVE AXIS: 25 DEGREES
VENTRICULAR RATE: 82 BPM

## 2024-02-06 NOTE — PROGRESS NOTES
SWCM called patient to follow up regarding patient securing counseling appointment. As per outreach on 1/10/24, SWCM spoke to patient and again provided contact information for [facility] ([phone]) and [facility] ([phone]).  Patient to establish care at that time.     SWCM completed chart review. Patient recent ED visit for chest pain. SWCM unable to reach patient. Left detailed voice message requesting return call. Contact information provided.    SWCM will remain available to assist with needs.

## 2024-02-13 ENCOUNTER — PATIENT OUTREACH (OUTPATIENT)
Dept: INTERNAL MEDICINE CLINIC | Facility: CLINIC | Age: 39
End: 2024-02-13

## 2024-02-13 NOTE — LETTER
02/13/24    Dear Aleksey Johnson,    I am a Care Manager with [facility] [address] [place] [address] [address]  [facility] [address] [phone].  We have made several attempts to call you by phone.  It is important that you contact us back at [phone] so that we can assist with your care needs.     Sincerely,       Avelina Boland LCSW   Care Manager

## 2024-02-13 NOTE — PROGRESS NOTES
SWCM called patient to follow up regarding patient securing counseling appointment (x2)   SWCM unable to reach patient. Left detailed voice message requesting return call. Contact information provided. IRENECM sent UTR letter via [facility]. SWCM will close case at this time.     SWCM will remain available to assist as needed.

## 2024-03-14 ENCOUNTER — TELEPHONE (OUTPATIENT)
Dept: OBGYN CLINIC | Facility: CLINIC | Age: 39
End: 2024-03-14

## 2024-03-20 ENCOUNTER — HOSPITAL ENCOUNTER (OUTPATIENT)
Dept: RADIOLOGY | Facility: HOSPITAL | Age: 39
Discharge: HOME | End: 2024-03-20
Attending: STUDENT IN AN ORGANIZED HEALTH CARE EDUCATION/TRAINING PROGRAM

## 2024-04-15 ENCOUNTER — HOSPITAL ENCOUNTER (OUTPATIENT)
Dept: INFUSION CENTER | Facility: HOSPITAL | Age: 39
Discharge: HOME | End: 2024-04-15

## 2024-04-15 VITALS
RESPIRATION RATE: 18 BRPM | SYSTOLIC BLOOD PRESSURE: 128 MMHG | OXYGEN SATURATION: 99 % | HEART RATE: 77 BPM | TEMPERATURE: 97.9 F | DIASTOLIC BLOOD PRESSURE: 79 MMHG

## 2024-04-15 DIAGNOSIS — D64.9 CHRONIC ANEMIA: ICD-10-CM

## 2024-04-15 NOTE — PROGRESS NOTES
Per Yardie Toussaint-Foster, DO, patient is OK to receive venofer. Dates changed. Patient's next scheduled appointment is 4/17 at [facility] infusion [facility].

## 2024-04-22 ENCOUNTER — HOSPITAL ENCOUNTER (OUTPATIENT)
Dept: INFUSION CENTER | Facility: HOSPITAL | Age: 39
Discharge: HOME | End: 2024-04-22
Payer: MEDICARE

## 2024-04-22 VITALS
TEMPERATURE: 97.6 F | SYSTOLIC BLOOD PRESSURE: 128 MMHG | HEART RATE: 89 BPM | DIASTOLIC BLOOD PRESSURE: 77 MMHG | OXYGEN SATURATION: 99 %

## 2024-04-22 DIAGNOSIS — D64.9 CHRONIC ANEMIA: Primary | ICD-10-CM

## 2024-04-22 PROCEDURE — 96365 THER/PROPH/DIAG IV INF INIT: CPT

## 2024-04-22 RX ORDER — SODIUM CHLORIDE 9 MG/ML
20 INJECTION, SOLUTION INTRAVENOUS ONCE
Status: COMPLETED | OUTPATIENT
Start: 2024-04-22 | End: 2024-04-22

## 2024-04-22 RX ORDER — SODIUM CHLORIDE 9 MG/ML
20 INJECTION, SOLUTION INTRAVENOUS ONCE
Status: CANCELLED | OUTPATIENT
Start: 2024-04-24

## 2024-04-22 RX ORDER — SODIUM CHLORIDE 9 MG/ML
20 INJECTION, SOLUTION INTRAVENOUS ONCE
Status: DISCONTINUED | OUTPATIENT
Start: 2024-04-22 | End: 2024-04-22

## 2024-04-22 RX ORDER — SODIUM CHLORIDE 9 MG/ML
20 INJECTION, SOLUTION INTRAVENOUS ONCE
OUTPATIENT
Start: 2024-04-24

## 2024-04-22 RX ADMIN — SODIUM CHLORIDE 20 ML/HR: 0.9 INJECTION, SOLUTION INTRAVENOUS at 11:43

## 2024-04-22 RX ADMIN — IRON SUCROSE 200 MG: 20 INJECTION, SOLUTION INTRAVENOUS at 11:48

## 2024-04-22 RX ADMIN — SODIUM CHLORIDE 20 ML/HR: 0.9 INJECTION, SOLUTION INTRAVENOUS at 11:29

## 2024-04-22 NOTE — PROGRESS NOTES
Aleksey Johnson  tolerated venofer well with no complications. Aware of future appt on 4/24/24 at 8:00am. AVS printed Patient left clinic ambulatory.

## 2024-04-24 ENCOUNTER — TELEPHONE (OUTPATIENT)
Dept: INFUSION CENTER | Facility: HOSPITAL | Age: 39
End: 2024-04-24

## 2024-04-25 ENCOUNTER — HOSPITAL ENCOUNTER (EMERGENCY)
Facility: HOSPITAL | Age: 39
Discharge: HOME | End: 2024-04-25
Attending: INTERNAL MEDICINE
Payer: MEDICARE

## 2024-04-25 ENCOUNTER — APPOINTMENT (EMERGENCY)
Dept: RADIOLOGY | Facility: HOSPITAL | Age: 39
End: 2024-04-25
Payer: MEDICARE

## 2024-04-25 ENCOUNTER — OFFICE VISIT (OUTPATIENT)
Dept: FAMILY MEDICINE CLINIC | Facility: CLINIC | Age: 39
End: 2024-04-25

## 2024-04-25 ENCOUNTER — APPOINTMENT (EMERGENCY)
Dept: NON INVASIVE DIAGNOSTICS | Facility: HOSPITAL | Age: 39
End: 2024-04-25
Payer: MEDICARE

## 2024-04-25 VITALS
OXYGEN SATURATION: 100 % | TEMPERATURE: 98.1 F | SYSTOLIC BLOOD PRESSURE: 142 MMHG | RESPIRATION RATE: 18 BRPM | DIASTOLIC BLOOD PRESSURE: 93 MMHG | HEART RATE: 74 BPM | WEIGHT: 195.99 LBS | BODY MASS INDEX: 34.72 KG/M2

## 2024-04-25 VITALS
HEIGHT: 63 IN | SYSTOLIC BLOOD PRESSURE: 127 MMHG | TEMPERATURE: 98 F | DIASTOLIC BLOOD PRESSURE: 79 MMHG | OXYGEN SATURATION: 99 % | BODY MASS INDEX: 34.55 KG/M2 | RESPIRATION RATE: 16 BRPM | WEIGHT: 195 LBS | HEART RATE: 88 BPM

## 2024-04-25 DIAGNOSIS — M79.601 RIGHT UPPER LIMB PAIN: ICD-10-CM

## 2024-04-25 DIAGNOSIS — M25.531 ACUTE PAIN OF RIGHT WRIST: Primary | ICD-10-CM

## 2024-04-25 DIAGNOSIS — M25.421 SWELLING OF JOINT OF UPPER ARM, RIGHT: Primary | ICD-10-CM

## 2024-04-25 PROCEDURE — 99214 OFFICE O/P EST MOD 30 MIN: CPT | Performed by: FAMILY MEDICINE

## 2024-04-25 PROCEDURE — 99284 EMERGENCY DEPT VISIT MOD MDM: CPT

## 2024-04-25 PROCEDURE — 93971 EXTREMITY STUDY: CPT

## 2024-04-25 PROCEDURE — 73110 X-RAY EXAM OF WRIST: CPT

## 2024-04-25 PROCEDURE — 73080 X-RAY EXAM OF ELBOW: CPT

## 2024-04-25 PROCEDURE — 96372 THER/PROPH/DIAG INJ SC/IM: CPT

## 2024-04-25 PROCEDURE — 93971 EXTREMITY STUDY: CPT | Performed by: SURGERY

## 2024-04-25 PROCEDURE — 99285 EMERGENCY DEPT VISIT HI MDM: CPT | Performed by: INTERNAL MEDICINE

## 2024-04-25 RX ORDER — GABAPENTIN 100 MG/1
100 CAPSULE ORAL 3 TIMES DAILY
Qty: 30 CAPSULE | Refills: 0 | Status: SHIPPED | OUTPATIENT
Start: 2024-04-25 | End: 2024-05-07

## 2024-04-25 RX ORDER — SENNOSIDES 8.6 MG
650 CAPSULE ORAL EVERY 8 HOURS PRN
Qty: 30 TABLET | Refills: 0 | Status: SHIPPED | OUTPATIENT
Start: 2024-04-25

## 2024-04-25 RX ORDER — CYCLOBENZAPRINE HCL 10 MG
10 TABLET ORAL 2 TIMES DAILY PRN
Qty: 20 TABLET | Refills: 0 | Status: SHIPPED | OUTPATIENT
Start: 2024-04-25 | End: 2024-05-07 | Stop reason: SDUPTHER

## 2024-04-25 RX ORDER — KETOROLAC TROMETHAMINE 30 MG/ML
30 INJECTION, SOLUTION INTRAMUSCULAR; INTRAVENOUS ONCE
Status: COMPLETED | OUTPATIENT
Start: 2024-04-25 | End: 2024-04-25

## 2024-04-25 RX ADMIN — KETOROLAC TROMETHAMINE 30 MG: 30 INJECTION, SOLUTION INTRAMUSCULAR; INTRAVENOUS at 15:53

## 2024-04-25 NOTE — ASSESSMENT & PLAN NOTE
Acute non-traumatic onset RUL pain and swelling for 3 days.  Reduced power, weakness altered sensation with parasthesia on exam.    - Would wish to rule out DVT, Attempt at Doppler ASAP in network for 26 hours despite URGENT scheduling. Discussed with pt, keen for ED eval. ADT21

## 2024-04-25 NOTE — PROGRESS NOTES
Name: Aleksey Johnson      : 1985      MRN: [id number]  Encounter Provider: Tushar Mccall MD  Encounter Date: 2024   Encounter department: [facility]    Assessment & Plan     1. Swelling of joint of upper arm, right  Assessment & Plan:  Acute non-traumatic onset RUL pain and swelling for 3 days.  Reduced power, weakness altered sensation with parasthesia on exam.    - Would wish to rule out DVT, Attempt at Doppler ASAP in network for 26 hours despite URGENT scheduling. Discussed with pt, keen for ED eval. ADT21    Orders:  -     VAS upper limb venous duplex scan, unilateral/limited; Future; Expected date: 2024  -     Transfer to other facility    2. Right upper limb pain  -     VAS upper limb venous duplex scan, unilateral/limited; Future; Expected date: 2024           Subjective      38 y.o F presents for R arm swelling with pain for 3 days   Woke up without traumatic insult with pain extending proximally from digits  Does have minimal response with tylenol   Pain worsened by movement   Relieved by rest minimally  Works as CNA, struggling with daily tasks  Otherwise well, no fever, systemic illness or recent travel.      Review of Systems   Constitutional:  Negative for chills and fever.   HENT:  Negative for ear pain and sore throat.    Eyes:  Negative for pain and visual disturbance.   Respiratory:  Negative for cough and shortness of breath.    Cardiovascular:  Negative for chest pain and palpitations.   Gastrointestinal:  Negative for abdominal pain and vomiting.   Genitourinary:  Negative for dysuria and hematuria.   Musculoskeletal:  Positive for arthralgias and joint swelling. Negative for back pain.   Skin:  Negative for color change and rash.   Neurological:  Negative for seizures and syncope.   All other systems reviewed and are negative.    Current Outpatient Medications on File Prior to Visit   Medication Sig    acetaminophen  "(TYLENOL) 325 mg tablet Take 2 tablets (650 mg total) by mouth every 6 (six) hours as needed for moderate pain    DULoxetine (CYMBALTA) 30 mg delayed release capsule Take 1 capsule (30 mg total) by mouth daily (Patient not taking: Reported on 12/19/2023)    hydrOXYzine HCL (ATARAX) 50 mg tablet Take 1 tablet (50 mg total) by mouth 3 (three) times a day as needed for itching (Patient not taking: Reported on 12/19/2023)    ibuprofen (MOTRIN) 800 mg tablet Take 1 tablet (800 mg total) by mouth every 6 (six) hours as needed (Pain)    imiquimod (ALDARA) 5 % cream Apply 1 packet topically 3 (three) times a week    medroxyPROGESTERone (PROVERA) 10 mg tablet Take 1 tablet (10 mg total) by mouth daily    methocarbamol (ROBAXIN) 750 mg tablet Take 2 tablets (1,500 mg total) by mouth daily at bedtime (Patient not taking: Reported on 12/19/2023)    miSOPROStol (Cytotec) 200 mcg tablet Take one tablet orally the night prior to procedure then Take one tablet insert intravaginally the morning of procedure at 5:30am    Restasis 0.05 % ophthalmic emulsion  (Patient not taking: Reported on 1/18/2024)       Objective     /79 (BP Location: Right arm, Patient Position: Sitting, Cuff Size: Standard)   Pulse 88   Temp 98 °F (36.7 °C) (Temporal)   Resp 16   Ht 5' 3\" (1.6 m)   Wt 88.5 kg (195 lb)   LMP 04/13/2024 (Exact Date)   SpO2 99%   BMI 34.54 kg/m²     Physical Exam  Vitals and nursing note reviewed.   Constitutional:       Appearance: Normal appearance.   HENT:      Head: Normocephalic.      Nose: Nose normal.      Mouth/Throat:      Mouth: Mucous membranes are moist.   Neck:      Thyroid: No thyroid mass, thyromegaly or thyroid tenderness.   Cardiovascular:      Rate and Rhythm: Normal rate and regular rhythm.      Pulses: Normal pulses.      Heart sounds: Normal heart sounds.   Pulmonary:      Effort: Pulmonary effort is normal.      Breath sounds: Normal breath sounds.   Abdominal:      Palpations: Abdomen is soft. "   Musculoskeletal:         General: Swelling present.      Right forearm: Swelling and tenderness present.      Left forearm: No swelling.      Right wrist: Swelling present.      Left wrist: No swelling.      Right hand: Swelling present.      Left hand: No swelling.      Cervical back: Full passive range of motion without pain.      Comments: RUL swelling distal to elbow  Pain in ulnar distribution of james  Power reduced and movements limited secondary to pain, but able to flex/extend wrist and digits   Skin:     General: Skin is warm.   Neurological:      General: No focal deficit present.      Mental Status: She is alert and oriented to person, place, and time.       Tushar Mccall MD

## 2024-04-25 NOTE — ED PROVIDER NOTES
History  Chief Complaint   Patient presents with    Arm Pain     Pt reports she woke up 3 days ago with arm pain and swelling, no trauma or injury. Reports she saw her PCP for it, and he is concerned for a blood clot, so sent her here.      HPI  38-year-old woman presents to ED for evaluation of right wrist pain and swelling.  She reports symptoms for the last 3 days.  She states she had experienced no trauma.  She denies any increased activity.  She states pain is worse with movement.  Better with rest.  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  The pain starts at the wrist and goes to her fourth and fifth digit.  The pain also radiates up her arm.  She was seen at her PCPs office today and was sent to the ER to rule out DVT.  She reports no previous history of DVT or PE.  No history of hypercoagulability in herself or her family.  Has never been anticoagulated.  She reports no other complaints or concerns.    Prior to Admission Medications   Prescriptions Last Dose Informant Patient Reported? Taking?   DULoxetine (CYMBALTA) 30 mg delayed release capsule   No No   Sig: Take 1 capsule (30 mg total) by mouth daily   Patient not taking: Reported on 12/19/2023   Restasis 0.05 % ophthalmic emulsion   Yes No   Patient not taking: Reported on 1/18/2024   acetaminophen (TYLENOL) 325 mg tablet  Self No No   Sig: Take 2 tablets (650 mg total) by mouth every 6 (six) hours as needed for moderate pain   hydrOXYzine HCL (ATARAX) 50 mg tablet   No No   Sig: Take 1 tablet (50 mg total) by mouth 3 (three) times a day as needed for itching   Patient not taking: Reported on 12/19/2023   ibuprofen (MOTRIN) 800 mg tablet   No No   Sig: Take 1 tablet (800 mg total) by mouth every 6 (six) hours as needed (Pain)   imiquimod (ALDARA) 5 % cream   No No   Sig: Apply 1 packet topically 3 (three) times a week   medroxyPROGESTERone (PROVERA) 10 mg tablet   No No   Sig: Take 1 tablet (10 mg total) by mouth daily   methocarbamol (ROBAXIN)  750 mg tablet   No No   Sig: Take 2 tablets (1,500 mg total) by mouth daily at bedtime   Patient not taking: Reported on 2023   miSOPROStol (Cytotec) 200 mcg tablet   No No   Sig: Take one tablet orally the night prior to procedure then Take one tablet insert intravaginally the morning of procedure at 5:30am      Facility-Administered Medications: None       Past Medical History:   Diagnosis Date    Abnormal Pap smear of cervix      colpo WNL;     Anxiety     no meds since 2018    Gonorrhea     Heart problem 2017    swelling of L side of heart - hospitalized x7 days @[facility], unsure of specifics, no issues thereafter       Past Surgical History:   Procedure Laterality Date     SECTION      TUBAL LIGATION         Family History   Problem Relation Age of Onset    Hypertension Mother     Hypertension Father     Cancer Maternal Uncle         throat    Breast cancer Neg Hx     Colon cancer Neg Hx     Ovarian cancer Neg Hx      I have reviewed and agree with the history as documented.    E-Cigarette/Vaping    E-Cigarette Use Never User      E-Cigarette/Vaping Substances    Nicotine No     THC No     CBD No     Flavoring No     Other No     Unknown No      Social History     Tobacco Use    Smoking status: Former     Current packs/day: 0.00     Types: Cigars, Cigarettes     Quit date:      Years since quittin.3    Smokeless tobacco: Never    Tobacco comments:     Uses hooka now   Vaping Use    Vaping status: Never Used   Substance Use Topics    Alcohol use: Yes     Comment: socially    Drug use: Yes     Types: Marijuana     Comment: daily marijuana use       Review of Systems   All other systems reviewed and are negative.      Physical Exam  Physical Exam  HENT:      Head: Normocephalic.      Mouth/Throat:      Mouth: Mucous membranes are moist.   Eyes:      Pupils: Pupils are equal, round, and reactive to light.   Cardiovascular:      Rate and Rhythm: Normal rate.      Pulses:  Normal pulses.   Pulmonary:      Effort: Pulmonary effort is normal. No respiratory distress.   Abdominal:      General: There is no distension.   Musculoskeletal:      Right elbow: Normal.      Left elbow: Normal.      Right forearm: Normal.      Left forearm: Normal.      Right wrist: Swelling and tenderness present. No deformity, effusion, lacerations, bony tenderness, snuff box tenderness or crepitus. Normal range of motion. Normal pulse.      Left wrist: Normal.      Right hand: Normal. Normal capillary refill. Normal pulse.      Left hand: Normal.      Comments: Bilateral median, ulnar and radial nerves intact.  Bilateral radial pulses palpable.   Skin:     General: Skin is warm.      Capillary Refill: Capillary refill takes less than 2 seconds.   Neurological:      General: No focal deficit present.      Mental Status: She is alert.   Psychiatric:         Mood and Affect: Mood normal.         Vital Signs  ED Triage Vitals   Temperature Pulse Respirations Blood Pressure SpO2   04/25/24 1540 04/25/24 1540 04/25/24 1540 04/25/24 1540 04/25/24 1540   98.1 °F (36.7 °C) 74 18 142/93 100 %      Temp Source Heart Rate Source Patient Position - Orthostatic VS BP Location FiO2 (%)   04/25/24 1540 04/25/24 1540 04/25/24 1540 04/25/24 1540 --   Oral Monitor Sitting Left arm       Pain Score       04/25/24 1553       10 - Worst Possible Pain           Vitals:    04/25/24 1540   BP: 142/93   Pulse: 74   Patient Position - Orthostatic VS: Sitting         Visual Acuity      ED Medications  Medications   ketorolac (TORADOL) injection 30 mg (30 mg Intramuscular Given 4/25/24 1553)       Diagnostic Studies  Results Reviewed       None                   XR wrist 3+ views RIGHT   Final Result by Emery Abraham MD (04/25 5133)      No acute osseous abnormality.            Workstation performed: [id number]         XR elbow 3+ vw right   Final Result by Emery Abraham MD (04/25 9770)      No acute osseous  abnormality.            Workstation performed: [id number]         VAS upper limb venous duplex scan, unilateral/limited   Final Result by Marie Davis MD (04/25 1711)                 Procedures  Procedures         ED Course  ED Course as of 04/25/24 1805   Thu Apr 25, 2024   1622 Duplex study of right upper extremity negative                               SBIRT 20yo+      Flowsheet Row Most Recent Value   Initial Alcohol Screen: US AUDIT-C     1. How often do you have a drink containing alcohol? 0 Filed at: 04/25/2024 1542   2. How many drinks containing alcohol do you have on a typical day you are drinking?  0 Filed at: 04/25/2024 1542   3a. Male UNDER 65: How often do you have five or more drinks on one occasion? 0 Filed at: 04/25/2024 1542   3b. FEMALE Any Age, or MALE 65+: How often do you have 4 or more drinks on one occassion? 0 Filed at: 04/25/2024 1542   Audit-C Score 0 Filed at: 04/25/2024 1542   DONNELL: How many times in the past year have you...    Used an illegal drug or used a prescription medication for non-medical reasons? Never Filed at: 04/25/2024 1542                      Medical Decision Making  Right wrist pain and edema.  Will rule out upper extremity DVT with duplex.  Will rule out acute fracture or dislocation with x-ray. Would also consider compression of the ulnar nerve as pain goes to the fourth and fifth digit.  Also consider ligament or tendon tear.  As well as soft tissue injury.  I do not believe this is an arterial injury as patient has warm distal upper extremities with easily palpable radial arteries.  I discussed plus and minus CTA with the patient and she would prefer not to have this done at this point.  Will likely need outpatient MRI of the upper extremity pain and swelling continue despite conservative treatment. And certainly she will need orthopedic follow-up if this continues.      We will call you regarding the final x-ray read of right wrist and elbow.  On my read, I did not  find see any fractures or dislocation.    Use Tylenol, flexeril, gabapentin and ice for pain relief.    Avoid activities and sports that may cause further injury, stress or pain.     If pain continues or worsens or fails to improve, follow up with Orthopedics, PCP or in the ER. Ortho referral placed    Discussed this pain with patient in depth and she was in agreement.      Amount and/or Complexity of Data Reviewed  Radiology: ordered.    Risk  OTC drugs.  Prescription drug management.             Disposition  Final diagnoses:   Acute pain of right wrist     Time reflects when diagnosis was documented in both MDM as applicable and the Disposition within this note       Time User Action Codes Description Comment    4/25/2024  5:22 PM KariRenettaa Add [M25.531] Acute pain of right wrist           ED Disposition       ED Disposition   Discharge    Condition   Stable    Date/Time   Thu Apr 25, 2024 1722    Comment   Aleksey Johnson discharge to home/self care.                   Follow-up Information       Follow up With Specialties Details Why Contact Info    Monika Gage PA-C Family Medicine Call in 1 day For MRI [address] [place] [id number]  [phone]              Discharge Medication List as of 4/25/2024  5:26 PM        START taking these medications    Details   acetaminophen (TYLENOL) 650 mg CR tablet Take 1 tablet (650 mg total) by mouth every 8 (eight) hours as needed for mild pain, Starting Thu 4/25/2024, Normal      cyclobenzaprine (FLEXERIL) 10 mg tablet Take 1 tablet (10 mg total) by mouth 2 (two) times a day as needed for muscle spasms, Starting Thu 4/25/2024, Normal      gabapentin (Neurontin) 100 mg capsule Take 1 capsule (100 mg total) by mouth 3 (three) times a day for 10 days For post-herpetic neuralgia: Take 1 tablet on day 1,  Then take 2 tablets on day 2, Then take 3 tablets on day 3 and every day after that as instructed by your doctor., Starting Thu  4/25/2024,  Until Sun 5/5/2024, Normal           CONTINUE these medications which have NOT CHANGED    Details   acetaminophen (TYLENOL) 325 mg tablet Take 2 tablets (650 mg total) by mouth every 6 (six) hours as needed for moderate pain, Starting Wed 12/14/2022, Print      DULoxetine (CYMBALTA) 30 mg delayed release capsule Take 1 capsule (30 mg total) by mouth daily, Starting Wed 9/13/2023, Normal      hydrOXYzine HCL (ATARAX) 50 mg tablet Take 1 tablet (50 mg total) by mouth 3 (three) times a day as needed for itching, Starting Wed 9/13/2023, Normal      ibuprofen (MOTRIN) 800 mg tablet Take 1 tablet (800 mg total) by mouth every 6 (six) hours as needed (Pain), Starting Wed 9/13/2023, Normal      imiquimod (ALDARA) 5 % cream Apply 1 packet topically 3 (three) times a week, Starting Wed 9/27/2023, Normal      medroxyPROGESTERone (PROVERA) 10 mg tablet Take 1 tablet (10 mg total) by mouth daily, Starting Thu 1/18/2024, Normal      methocarbamol (ROBAXIN) 750 mg tablet Take 2 tablets (1,500 mg total) by mouth daily at bedtime, Starting Wed 9/13/2023, Normal      miSOPROStol (Cytotec) 200 mcg tablet Take one tablet orally the night prior to procedure then Take one tablet insert intravaginally the morning of procedure at 5:30am, Normal      Restasis 0.05 % ophthalmic emulsion Starting Mon 9/11/2023, Historical Med                 PDMP Review       None            ED Provider  Electronically Signed by             Fiona Pierce MD  04/25/24 3582

## 2024-04-26 ENCOUNTER — TELEPHONE (OUTPATIENT)
Age: 39
End: 2024-04-26

## 2024-04-26 NOTE — TELEPHONE ENCOUNTER
Patient is being referred to a orthopedics. Please schedule accordingly.    [facility]   [phone]

## 2024-04-29 ENCOUNTER — HOSPITAL ENCOUNTER (OUTPATIENT)
Dept: INFUSION CENTER | Facility: HOSPITAL | Age: 39
Discharge: HOME | End: 2024-04-29
Attending: OBSTETRICS & GYNECOLOGY
Payer: MEDICARE

## 2024-04-29 VITALS
HEART RATE: 77 BPM | RESPIRATION RATE: 18 BRPM | DIASTOLIC BLOOD PRESSURE: 75 MMHG | SYSTOLIC BLOOD PRESSURE: 111 MMHG | TEMPERATURE: 97.8 F

## 2024-04-29 DIAGNOSIS — D64.9 CHRONIC ANEMIA: Primary | ICD-10-CM

## 2024-04-29 PROCEDURE — 96365 THER/PROPH/DIAG IV INF INIT: CPT

## 2024-04-29 RX ORDER — SODIUM CHLORIDE 9 MG/ML
20 INJECTION, SOLUTION INTRAVENOUS ONCE
Status: COMPLETED | OUTPATIENT
Start: 2024-04-29 | End: 2024-04-29

## 2024-04-29 RX ORDER — SODIUM CHLORIDE 9 MG/ML
20 INJECTION, SOLUTION INTRAVENOUS ONCE
Status: CANCELLED | OUTPATIENT
Start: 2024-05-01

## 2024-04-29 RX ADMIN — SODIUM CHLORIDE 20 ML/HR: 0.9 INJECTION, SOLUTION INTRAVENOUS at 09:11

## 2024-04-29 RX ADMIN — IRON SUCROSE 200 MG: 20 INJECTION, SOLUTION INTRAVENOUS at 09:11

## 2024-04-29 NOTE — PROGRESS NOTES
Aleksey Johnson  tolerated treatment well with no complications.      Aleksey Johnson is aware of future appt on 5/1 at 9am.     AVS printed and given to Aleksey Johnson:    No (Declined by Aleksey Johnson)

## 2024-05-01 ENCOUNTER — HOSPITAL ENCOUNTER (OUTPATIENT)
Dept: INFUSION CENTER | Facility: HOSPITAL | Age: 39
Discharge: HOME | End: 2024-05-01
Attending: OBSTETRICS & GYNECOLOGY
Payer: MEDICARE

## 2024-05-01 VITALS
HEART RATE: 83 BPM | SYSTOLIC BLOOD PRESSURE: 103 MMHG | DIASTOLIC BLOOD PRESSURE: 69 MMHG | TEMPERATURE: 96.6 F | RESPIRATION RATE: 16 BRPM

## 2024-05-01 DIAGNOSIS — D64.9 CHRONIC ANEMIA: Primary | ICD-10-CM

## 2024-05-01 PROCEDURE — 96365 THER/PROPH/DIAG IV INF INIT: CPT

## 2024-05-01 RX ORDER — SODIUM CHLORIDE 9 MG/ML
20 INJECTION, SOLUTION INTRAVENOUS ONCE
Status: CANCELLED | OUTPATIENT
Start: 2024-05-06

## 2024-05-01 RX ORDER — SODIUM CHLORIDE 9 MG/ML
20 INJECTION, SOLUTION INTRAVENOUS ONCE
Status: COMPLETED | OUTPATIENT
Start: 2024-05-01 | End: 2024-05-01

## 2024-05-01 RX ADMIN — IRON SUCROSE 200 MG: 20 INJECTION, SOLUTION INTRAVENOUS at 10:02

## 2024-05-01 RX ADMIN — SODIUM CHLORIDE 20 ML/HR: 0.9 INJECTION, SOLUTION INTRAVENOUS at 10:03

## 2024-05-01 NOTE — PROGRESS NOTES
Patient here for venofer, tolerated infusion without incident. Patient is aware of next appointment 5/6 at 0900, appointment calendar provided.

## 2024-05-06 ENCOUNTER — HOSPITAL ENCOUNTER (OUTPATIENT)
Dept: INFUSION CENTER | Facility: HOSPITAL | Age: 39
Discharge: HOME | End: 2024-05-06
Attending: OBSTETRICS & GYNECOLOGY
Payer: MEDICARE

## 2024-05-06 VITALS
RESPIRATION RATE: 18 BRPM | HEART RATE: 83 BPM | DIASTOLIC BLOOD PRESSURE: 75 MMHG | TEMPERATURE: 97.3 F | SYSTOLIC BLOOD PRESSURE: 114 MMHG

## 2024-05-06 DIAGNOSIS — D64.9 CHRONIC ANEMIA: Primary | ICD-10-CM

## 2024-05-06 PROCEDURE — 96365 THER/PROPH/DIAG IV INF INIT: CPT

## 2024-05-06 RX ORDER — SODIUM CHLORIDE 9 MG/ML
20 INJECTION, SOLUTION INTRAVENOUS ONCE
Status: COMPLETED | OUTPATIENT
Start: 2024-05-06 | End: 2024-05-06

## 2024-05-06 RX ORDER — SODIUM CHLORIDE 9 MG/ML
20 INJECTION, SOLUTION INTRAVENOUS ONCE
Status: CANCELLED | OUTPATIENT
Start: 2024-05-08

## 2024-05-06 RX ADMIN — SODIUM CHLORIDE 20 ML/HR: 0.9 INJECTION, SOLUTION INTRAVENOUS at 09:33

## 2024-05-06 RX ADMIN — IRON SUCROSE 200 MG: 20 INJECTION, SOLUTION INTRAVENOUS at 09:33

## 2024-05-06 NOTE — PROGRESS NOTES
Aleksey Johnson  tolerated treatment well with no complications.      Aleksey Johnson is aware of future appt on 5/8/24 at 13:30.     AVS printed and given to Aleksey Johnson:  No (Declined by Aleksey Johnson)

## 2024-05-07 ENCOUNTER — HOSPITAL ENCOUNTER (OUTPATIENT)
Dept: RADIOLOGY | Age: 39
Discharge: HOME | End: 2024-05-07
Payer: MEDICARE

## 2024-05-07 ENCOUNTER — OFFICE VISIT (OUTPATIENT)
Dept: OBGYN CLINIC | Facility: CLINIC | Age: 39
End: 2024-05-07
Payer: MEDICARE

## 2024-05-07 VITALS
HEIGHT: 63 IN | SYSTOLIC BLOOD PRESSURE: 120 MMHG | BODY MASS INDEX: 34.55 KG/M2 | DIASTOLIC BLOOD PRESSURE: 60 MMHG | WEIGHT: 195 LBS

## 2024-05-07 DIAGNOSIS — Z12.31 ENCOUNTER FOR SCREENING MAMMOGRAM FOR MALIGNANT NEOPLASM OF BREAST: ICD-10-CM

## 2024-05-07 DIAGNOSIS — M25.531 ACUTE PAIN OF RIGHT WRIST: ICD-10-CM

## 2024-05-07 PROCEDURE — 99203 OFFICE O/P NEW LOW 30 MIN: CPT | Performed by: SURGERY

## 2024-05-07 PROCEDURE — 77063 BREAST TOMOSYNTHESIS BI: CPT

## 2024-05-07 PROCEDURE — 77067 SCR MAMMO BI INCL CAD: CPT

## 2024-05-07 RX ORDER — METHYLPREDNISOLONE 4 MG/1
TABLET ORAL
Qty: 21 TABLET | Refills: 0 | Status: SHIPPED | OUTPATIENT
Start: 2024-05-07

## 2024-05-07 RX ORDER — NAPROXEN 500 MG/1
500 TABLET ORAL 2 TIMES DAILY WITH MEALS
Qty: 28 TABLET | Refills: 0 | Status: SHIPPED | OUTPATIENT
Start: 2024-05-07 | End: 2024-05-21

## 2024-05-07 NOTE — PROGRESS NOTES
Assessment:    Right wrist extensor tendonitis with ulnar nerve inflammation      Plan:    Recommend oral Medrol dose pack to decrease overall inflammation.  Once this is complete, start Naproxen BID x 2 weeks.  Scripts sent to pharmacy.    Continue to wear Velcro wrist brace at night time.  Activities to tolerance.  Follow-up 4 weeks for re-evaluation of symptoms.          Subjective:     HPI    Patient ID:  Aleksey Johnson is a right hand dominant 38 y.o. female here for evaluation of the right upper extremity.  According to the patient, she woke up 1 morning about 3 weeks ago with pain in the right dorsal forearm that radiated to the wrist and to the elbow without acute injury or trauma.  This was associated with swelling.  She went to her primary care office who referred her to the ER due to concerns for blood clot.  In the ER she received elbow and wrist x-rays which were negative, as well as a vascular duplex which did not reveal any blood clot.  She states she was prescribed gabapentin and muscle relaxers which has not really helped.  She has not been wearing the brace that she was given in the ER as well.  She states wrist extension makes the pain worse at rest it does not hurt.  She has some numbness and tingling sensations of mainly the 4th/5th digits.  She feels like her finger motion is decreased as well.  She has no history of surgery to the right upper extremity.      The following portions of the patient's history were reviewed and updated as appropriate: allergies, current medications, past family history, past medical history, past social history, past surgical history, and problem list.    Review of Systems     Objective:    Imaging:  Right wrist x-rays 4/25/2024    FINDINGS:     No acute fracture or dislocation.     No significant degenerative changes.     No lytic or blastic osseous lesion.     Unremarkable soft tissues.     IMPRESSION:     No acute osseous abnormality.    Right elbow  x-rays 4/25/2024  FINDINGS:     No acute fracture or dislocation.     No joint effusion.     No significant degenerative changes.     No lytic or blastic osseous lesion.     Unremarkable soft tissues.     IMPRESSION:     No acute osseous abnormality    VAS US RUE 4/25/2024  CONCLUSION:     Impression  RIGHT UPPER LIMB:  No evidence of acute or chronic deep vein thrombosis.  No evidence of superficial thrombophlebitis noted.  Doppler evaluation shows a normal response to augmentation maneuvers.     LEFT UPPER LIMB LIMITED:  Evaluation shows no evidence of thrombus in the internal jugular vein,  subclavian vein, and the innominate vein.    Physical Exam     Vitals:    05/07/24 0859   BP: 120/60       General appearance:  NAD   Cardiac:  Regular rate  Lungs:  Unlabored breathing  Abdomen:  Non-distended    Orthopedic Examination:  Right upper extremity    Inspection:  No open wounds or erythema.  No ecchymosis.  Mild dorsal ulnar wrist swelling compared to the contralateral side.  No bony visual deformity.    Palpation:  + TTP ECU tendon.  NTTP radial wrist.  Minimal tenderness medial and lateral epicondyle    Range-of-motion:  Normal elbow ROM, guarding with active and passive wrist ROM and finger motion.    Strength:  Decreased wrist and  strength 2/2 guarding and limited ROM.    Sensation:  ILT m/r/u nerve distribution    Special Tests:  + Tinel's medial elbow only.    Palpable radial pulse  The UE is warm and well perfused.

## 2024-05-08 ENCOUNTER — HOSPITAL ENCOUNTER (OUTPATIENT)
Dept: INFUSION CENTER | Facility: HOSPITAL | Age: 39
Discharge: HOME | End: 2024-05-08
Attending: OBSTETRICS & GYNECOLOGY

## 2024-05-15 ENCOUNTER — HOSPITAL ENCOUNTER (OUTPATIENT)
Dept: INFUSION CENTER | Facility: HOSPITAL | Age: 39
End: 2024-05-15

## 2024-05-17 ENCOUNTER — HOSPITAL ENCOUNTER (OUTPATIENT)
Dept: INFUSION CENTER | Facility: HOSPITAL | Age: 39
End: 2024-05-17

## 2024-05-22 ENCOUNTER — APPOINTMENT (OUTPATIENT)
Dept: LAB | Facility: HOSPITAL | Age: 39
End: 2024-05-22
Payer: MEDICARE

## 2024-05-22 ENCOUNTER — OFFICE VISIT (OUTPATIENT)
Dept: OBGYN CLINIC | Facility: CLINIC | Age: 39
End: 2024-05-22

## 2024-05-22 ENCOUNTER — TELEPHONE (OUTPATIENT)
Dept: OBGYN CLINIC | Facility: CLINIC | Age: 39
End: 2024-05-22

## 2024-05-22 VITALS
HEIGHT: 63 IN | SYSTOLIC BLOOD PRESSURE: 105 MMHG | WEIGHT: 194.2 LBS | BODY MASS INDEX: 34.41 KG/M2 | DIASTOLIC BLOOD PRESSURE: 72 MMHG | HEART RATE: 99 BPM

## 2024-05-22 DIAGNOSIS — D25.1 INTRAMURAL UTERINE FIBROID: ICD-10-CM

## 2024-05-22 DIAGNOSIS — D50.0 IRON DEFICIENCY ANEMIA DUE TO CHRONIC BLOOD LOSS: ICD-10-CM

## 2024-05-22 DIAGNOSIS — N93.9 ABNORMAL UTERINE BLEEDING (AUB): Primary | ICD-10-CM

## 2024-05-22 DIAGNOSIS — D64.9 CHRONIC ANEMIA: ICD-10-CM

## 2024-05-22 DIAGNOSIS — E66.09 CLASS 1 OBESITY DUE TO EXCESS CALORIES WITHOUT SERIOUS COMORBIDITY WITH BODY MASS INDEX (BMI) OF 34.0 TO 34.9 IN ADULT: ICD-10-CM

## 2024-05-22 LAB
ALBUMIN SERPL BCP-MCNC: 4.2 G/DL (ref 3.5–5)
ALP SERPL-CCNC: 48 U/L (ref 34–104)
ALT SERPL W P-5'-P-CCNC: 36 U/L (ref 7–52)
ANION GAP SERPL CALCULATED.3IONS-SCNC: 8 MMOL/L (ref 4–13)
AST SERPL W P-5'-P-CCNC: 38 U/L (ref 13–39)
BASOPHILS # BLD AUTO: 0.03 THOUSANDS/ÂΜL (ref 0–0.1)
BASOPHILS NFR BLD AUTO: 1 % (ref 0–1)
BILIRUB SERPL-MCNC: 0.32 MG/DL (ref 0.2–1)
BUN SERPL-MCNC: 7 MG/DL (ref 5–25)
CALCIUM SERPL-MCNC: 9.3 MG/DL (ref 8.4–10.2)
CHLORIDE SERPL-SCNC: 106 MMOL/L (ref 96–108)
CO2 SERPL-SCNC: 26 MMOL/L (ref 21–32)
CREAT SERPL-MCNC: 0.75 MG/DL (ref 0.6–1.3)
EOSINOPHIL # BLD AUTO: 0.32 THOUSAND/ÂΜL (ref 0–0.61)
EOSINOPHIL NFR BLD AUTO: 5 % (ref 0–6)
ERYTHROCYTE [DISTWIDTH] IN BLOOD BY AUTOMATED COUNT: 25.7 % (ref 11.6–15.1)
EST. AVERAGE GLUCOSE BLD GHB EST-MCNC: 94 MG/DL
GFR SERPL CREATININE-BSD FRML MDRD: 100 ML/MIN/1.73SQ M
GLUCOSE P FAST SERPL-MCNC: 94 MG/DL (ref 65–99)
HBA1C MFR BLD: 4.9 %
HCT VFR BLD AUTO: 33.6 % (ref 34.8–46.1)
HGB BLD-MCNC: 9.5 G/DL (ref 11.5–15.4)
IMM GRANULOCYTES # BLD AUTO: 0.01 THOUSAND/UL (ref 0–0.2)
IMM GRANULOCYTES NFR BLD AUTO: 0 % (ref 0–2)
LYMPHOCYTES # BLD AUTO: 1.96 THOUSANDS/ÂΜL (ref 0.6–4.47)
LYMPHOCYTES NFR BLD AUTO: 31 % (ref 14–44)
MCH RBC QN AUTO: 23.5 PG (ref 26.8–34.3)
MCHC RBC AUTO-ENTMCNC: 28.3 G/DL (ref 31.4–37.4)
MCV RBC AUTO: 83 FL (ref 82–98)
MONOCYTES # BLD AUTO: 0.4 THOUSAND/ÂΜL (ref 0.17–1.22)
MONOCYTES NFR BLD AUTO: 6 % (ref 4–12)
NEUTROPHILS # BLD AUTO: 3.64 THOUSANDS/ÂΜL (ref 1.85–7.62)
NEUTS SEG NFR BLD AUTO: 57 % (ref 43–75)
NRBC BLD AUTO-RTO: 0 /100 WBCS
PLATELET # BLD AUTO: 492 THOUSANDS/UL (ref 149–390)
PMV BLD AUTO: 10.1 FL (ref 8.9–12.7)
POTASSIUM SERPL-SCNC: 3.9 MMOL/L (ref 3.5–5.3)
PROT SERPL-MCNC: 7.2 G/DL (ref 6.4–8.4)
RBC # BLD AUTO: 4.04 MILLION/UL (ref 3.81–5.12)
SODIUM SERPL-SCNC: 140 MMOL/L (ref 135–147)
WBC # BLD AUTO: 6.36 THOUSAND/UL (ref 4.31–10.16)

## 2024-05-22 PROCEDURE — 83036 HEMOGLOBIN GLYCOSYLATED A1C: CPT

## 2024-05-22 PROCEDURE — 80053 COMPREHEN METABOLIC PANEL: CPT

## 2024-05-22 PROCEDURE — 85025 COMPLETE CBC W/AUTO DIFF WBC: CPT

## 2024-05-22 PROCEDURE — 99213 OFFICE O/P EST LOW 20 MIN: CPT | Performed by: OBSTETRICS & GYNECOLOGY

## 2024-05-22 PROCEDURE — 36415 COLL VENOUS BLD VENIPUNCTURE: CPT

## 2024-05-22 RX ORDER — MISOPROSTOL 200 UG/1
TABLET ORAL
Qty: 2 TABLET | Refills: 0 | Status: SHIPPED | OUTPATIENT
Start: 2024-05-22

## 2024-05-22 RX ORDER — SODIUM CHLORIDE 9 MG/ML
20 INJECTION, SOLUTION INTRAVENOUS ONCE
OUTPATIENT
Start: 2024-06-04

## 2024-05-22 RX ORDER — TRANEXAMIC ACID 650 MG/1
650 TABLET ORAL 3 TIMES DAILY
Qty: 15 TABLET | Refills: 6 | Status: SHIPPED | OUTPATIENT
Start: 2024-05-22 | End: 2024-05-27

## 2024-05-22 NOTE — PROGRESS NOTES
PROBLEM GYNECOLOGICAL VISIT    Aleksey Johnson is a 38 y.o. female who presents today for follow up.  Her general medical history has been reviewed and she reports it as follows:    Past Medical History:   Diagnosis Date    Abnormal Pap smear of cervix      colpo WNL;     Anxiety     no meds since 2018    Gonorrhea     Heart problem 2017    swelling of L side of heart - hospitalized x7 days @[place], unsure of specifics, no issues thereafter     Past Surgical History:   Procedure Laterality Date     SECTION      TUBAL LIGATION       OB History          5    Para   3    Term   3       0    AB   2    Living   3         SAB   0    IAB   2    Ectopic   0    Multiple   0    Live Births   3               Social History     Tobacco Use    Smoking status: Former     Current packs/day: 0.00     Types: Cigars, Cigarettes     Quit date:      Years since quittin.3     Passive exposure: Past    Smokeless tobacco: Never    Tobacco comments:     Uses hooka now   Vaping Use    Vaping status: Never Used   Substance Use Topics    Alcohol use: Yes     Comment: socially    Drug use: Not Currently     Types: Marijuana     Comment: daily marijuana use     Social History     Substance and Sexual Activity   Sexual Activity Not Currently    Partners: Male    Birth control/protection: Female Sterilization       Current Outpatient Medications   Medication Instructions    acetaminophen (TYLENOL) 650 mg, Oral, Every 6 hours PRN    acetaminophen (TYLENOL) 650 mg, Oral, Every 8 hours PRN    DULoxetine (CYMBALTA) 30 mg, Oral, Daily    hydrOXYzine HCL (ATARAX) 50 mg, Oral, 3 times daily PRN    imiquimod (ALDARA) 5 % cream 1 packet, Topical, 3 times weekly    medroxyPROGESTERone (PROVERA) 10 mg, Oral, Daily    methocarbamol (ROBAXIN) 1,500 mg, Oral, Daily at bedtime    methylPREDNISolone 4 MG tablet therapy pack 24mg PO on day 1, then decrease by 4mg/day x 5 days per dose pack instructions.  "   miSOPROStol (Cytotec) 200 mcg tablet Take one tablet orally the night prior to procedure then Take one tablet insert intravaginally the morning of procedure at 5:30am    miSOPROStol (Cytotec) 200 mcg tablet Take one tablet orally the night prior to procedure then Take one tablet insert intravaginally the morning of the procedure at 5:30am    naproxen (NAPROSYN) 500 mg, Oral, 2 times daily with meals    Restasis 0.05 % ophthalmic emulsion No dose, route, or frequency recorded.       History of Present Illness:   Patient presents for follow up for symptomatic anemia secondary to fibroid uterus and AUB.    Review of Systems:  Review of Systems   Genitourinary:  Positive for menstrual problem.        AUB, menorrhagia   All other systems reviewed and are negative.      Physical Exam:  /72 (BP Location: Left arm, Patient Position: Sitting, Cuff Size: Standard)   Pulse 99   Ht 5' 3\" (1.6 m)   Wt 88.1 kg (194 lb 3.2 oz)   LMP 05/14/2024 (Exact Date)   BMI 34.40 kg/m²   Physical Exam  Constitutional:       Appearance: Normal appearance.   Neurological:      Mental Status: She is alert.   Vitals reviewed.             Assessment:   1. Chronic anemia   2. Symptomatic fibroid uterus    Plan:   1. Iron transfusion to restart: placed order   2. TXA/Cytotec escribed   3. Bloodwork ordered: cbc   4. Return to office 3-4wks for EMB.   5. Patient's depression screening was assessed with a PHQ-2 score of 0. Their PHQ-9 score was 0. Clinically patient does not have depression. No treatment is required.      Reviewed with patient that test results are available in [facility] immediately, but that they will not necessarily be reviewed by me immediately.  Explained that I will review results at my earliest opportunity and contact patient appropriately.  "

## 2024-06-24 NOTE — PRE-PROCEDURE INSTRUCTIONS
Pre-Surgery Instructions:   Medication Instructions    naproxen (Naprosyn) 500 mg tablet Hold day of surgery.    The patient should have nothing to eat or drink after 11 pm the night before the MRI. The patient may take their medications with a sip of water at least 2 hours prior to their arrival time.  You will receive a call the evening before your MRI appointment with additional instructions.      Please leave your jewelry and valuables at home, wedding rings are the exception.   Please bring your physician order, insurance cards, a form of photo ID and a list of your medications with you. Arrive 15 minutes prior to your appointment time in order to register. Please bring any prior CT or MRI studies of this area that were not performed at a [facility].

## 2024-07-08 ENCOUNTER — HOSPITAL ENCOUNTER (OUTPATIENT)
Dept: INFUSION CENTER | Facility: HOSPITAL | Age: 39
Discharge: HOME | End: 2024-07-08
Payer: MEDICARE

## 2024-07-08 VITALS
TEMPERATURE: 97.8 F | HEART RATE: 87 BPM | SYSTOLIC BLOOD PRESSURE: 125 MMHG | DIASTOLIC BLOOD PRESSURE: 82 MMHG | RESPIRATION RATE: 18 BRPM

## 2024-07-08 DIAGNOSIS — D50.0 IRON DEFICIENCY ANEMIA DUE TO CHRONIC BLOOD LOSS: Primary | ICD-10-CM

## 2024-07-08 DIAGNOSIS — D64.9 CHRONIC ANEMIA: ICD-10-CM

## 2024-07-08 PROCEDURE — 96365 THER/PROPH/DIAG IV INF INIT: CPT

## 2024-07-08 RX ORDER — SODIUM CHLORIDE 9 MG/ML
20 INJECTION, SOLUTION INTRAVENOUS ONCE
Status: COMPLETED | OUTPATIENT
Start: 2024-07-08 | End: 2024-07-08

## 2024-07-08 RX ORDER — SODIUM CHLORIDE 9 MG/ML
20 INJECTION, SOLUTION INTRAVENOUS ONCE
Status: CANCELLED | OUTPATIENT
Start: 2024-07-11

## 2024-07-08 RX ADMIN — SODIUM CHLORIDE 20 ML/HR: 0.9 INJECTION, SOLUTION INTRAVENOUS at 09:48

## 2024-07-08 RX ADMIN — IRON SUCROSE 200 MG: 20 INJECTION, SOLUTION INTRAVENOUS at 09:52

## 2024-07-08 NOTE — PROGRESS NOTES
{PCP Note Templates:52802}Transition of Care Visit  Name: Aleksey Johnson      : 1985      MRN: [id number]  Encounter Provider: Glenny Loco MD  Encounter Date: 2024   Encounter department: [facility]    Assessment & Plan   1. Encounter for immunization         History of Present Illness   {Disappearing Hyperlinks I Encounters * My Last Note * Since Last Visit * History :28559}  Transitional Care Management Review:   Aleksey Johnson is a 38 y.o. female here for TCM follow up.     During the TCM phone call patient stated:  TCM Call       None          TCM Call       None          39 y/o female u/l hx of anxiety n depression here for sign off needed to get anaestheise for Mri  due soon .    Due to u/l HX OF HEADACHES FOR PAST 5 YEARS, looking out for possible ANERUYSM..      Headache: Sharp frontal pain, every other day lasting about min  Assoc photophobia and watery eyes  Denies any assoc  no rhinorrhea,   No pptng. Relieved by turn light off.  Prescribed abortive tx- relieves sometimes.   Denies any body weakness  Px denies beuing as hyudfration.    Also refill hydroxyzine -for anxiety 10 mg.  Anxiety controlled by dauggters medication. Last prescription was 3 months ago.  Also taking duloxetine for depression.  Waitig to see psych - 2 months for now to see psych.         Infusion since last year.  Every few months  Anemia her whole life.   No family hx os sickle cell anemia, thal, blood d/os, colon ca  GERMIE myositis HX IN FAMILY   Symptoms: lightheadness, headcach, menorrheam menorrhagia,   U/l fibroid.  7 days heavy.   No colon ca     Hx ca - ovarian mum post radiation and surgery.         Review of Systems  Objective   {Disappearing Hyperlinks   Review Vitals * Enter New Vitals * Results Review * Labs * Imaging * Cardiology * Procedures * Lung Cancer Screening :90609}  There were no vitals taken for this visit.    Physical Exam  {  Medications have NOT been reviewed by provider in current encounter. Once medications have been reviewed, please refresh your progress note so that you can sign the visit }    Administrative Statements {Disappearing Hyperlinks I  Level of Service * PCMH/PCSP:12540}  {Time Spent Statement (Optional):67354}

## 2024-07-08 NOTE — PROGRESS NOTES
Pt tolerated venofer infusion without difficulty.  No s/s reaction noted.  Confirmed next appt on 7/11 at 0930.  AVS declined.  Left ambulatory in stable condition.

## 2024-07-09 ENCOUNTER — CONSULT (OUTPATIENT)
Dept: FAMILY MEDICINE CLINIC | Facility: CLINIC | Age: 39
End: 2024-07-09

## 2024-07-09 VITALS
BODY MASS INDEX: 33.84 KG/M2 | WEIGHT: 191 LBS | TEMPERATURE: 98 F | DIASTOLIC BLOOD PRESSURE: 74 MMHG | SYSTOLIC BLOOD PRESSURE: 110 MMHG | HEIGHT: 63 IN | OXYGEN SATURATION: 99 % | HEART RATE: 82 BPM | RESPIRATION RATE: 16 BRPM

## 2024-07-09 DIAGNOSIS — F32.A ANXIETY AND DEPRESSION: Chronic | ICD-10-CM

## 2024-07-09 DIAGNOSIS — F41.9 ANXIETY AND DEPRESSION: Chronic | ICD-10-CM

## 2024-07-09 DIAGNOSIS — Z01.818 PRE-OP EXAMINATION: Primary | ICD-10-CM

## 2024-07-09 PROCEDURE — 99244 OFF/OP CNSLTJ NEW/EST MOD 40: CPT | Performed by: FAMILY MEDICINE

## 2024-07-09 RX ORDER — HYDROXYZINE HYDROCHLORIDE 25 MG/1
25 TABLET, FILM COATED ORAL EVERY 6 HOURS PRN
Qty: 30 TABLET | Refills: 1 | Status: SHIPPED | OUTPATIENT
Start: 2024-07-09

## 2024-07-09 RX ORDER — HYDROXYZINE HYDROCHLORIDE 25 MG/1
25 TABLET, FILM COATED ORAL DAILY PRN
Qty: 30 TABLET | Refills: 0 | Status: SHIPPED | OUTPATIENT
Start: 2024-07-09 | End: 2024-07-09 | Stop reason: SDUPTHER

## 2024-07-09 RX ORDER — HYDROXYZINE 50 MG/1
50 TABLET, FILM COATED ORAL 3 TIMES DAILY PRN
Qty: 30 TABLET | Refills: 0 | Status: SHIPPED | OUTPATIENT
Start: 2024-07-09 | End: 2024-07-09 | Stop reason: CLARIF

## 2024-07-09 NOTE — PROGRESS NOTES
Family Practice Pre-Operative Evaluation      Chief Complaint: Pre-operative Evaluation     Surgery: MRI for brain  Anticipated Date of Surgery: 07/17/2024  Referring Provider: Self, Referral       History of Present Illness:     Aleksey Johnson is a 38 y.o. female who presents to the office today for a preoperative consultation at the request of surgeon, who plans on performing brain MRI on 06/17/2024. Planned anesthesia is conscious sedation with anaesthesia standby. . Patient has a bleeding risk of: recent abnormal bleeding (Abnormal uterine bleeding). Patient does not have objections to receiving blood products if needed. Current anti-platelet/anti-coagulation medications that the patient is prescribed includes:  NONE .      Assessment of Chronic Conditions:   - anxiety and depression. Abnormal uterine bleeding.     Assessment of Cardiac Risk:  Denies unstable or severe angina or MI in the last 6 weeks or history of stent placement in the last year   Denies decompensated heart failure (e.g. New onset heart failure, NYHA functional class IV heart failure, or worsening existing heart failure)  Denies significant arrhythmias such as high grade AV block, symptomatic ventricular arrhythmia, newly recognized ventricular tachycardia, supraventricular tachycardia with resting heart rate >100, or symptomatic bradycardia  Denies severe heart valve disease including aortic stenosis or symptomatic mitral stenosis     Exercise Capacity:  Able to walk 4 blocks without symptoms?: Yes  Able to walk 2 flights without symptoms?: Yes    Prior Anesthesia Reactions: No     Personal history of venous thromboembolic disease? No    History of steroid use for >2 weeks within last year? Yes         Review of Systems:     Review of Systems   Constitutional:  Negative for chills and fever.   HENT:  Negative for ear pain and sore throat.    Eyes:  Negative for pain and visual disturbance.   Respiratory:  Negative for cough and  shortness of breath.    Cardiovascular:  Negative for chest pain and palpitations.   Gastrointestinal:  Negative for abdominal pain and vomiting.   Genitourinary:  Negative for dysuria and hematuria.   Musculoskeletal:  Negative for arthralgias and back pain.   Skin:  Negative for color change and rash.   Neurological:  Positive for headaches. Negative for seizures and syncope.   All other systems reviewed and are negative.      Current Problem List:     Patient Active Problem List   Diagnosis    Anxiety and depression    Other chest pain    Chronic anemia    Chronic low back pain    Swelling of joint of upper arm, right    Acute pain of right wrist    Iron deficiency anemia due to chronic blood loss       Allergies:     No Known Allergies    Current Medications:       Current Outpatient Medications:     acetaminophen (TYLENOL) 650 mg CR tablet, Take 1 tablet (650 mg total) by mouth every 8 (eight) hours as needed for mild pain, Disp: 30 tablet, Rfl: 0    DULoxetine (CYMBALTA) 30 mg delayed release capsule, Take 1 capsule (30 mg total) by mouth daily, Disp: 30 capsule, Rfl: 2    naproxen (Naprosyn) 500 mg tablet, Take 1 tablet (500 mg total) by mouth 2 (two) times a day with meals for 14 days, Disp: 28 tablet, Rfl: 0    acetaminophen (TYLENOL) 325 mg tablet, Take 2 tablets (650 mg total) by mouth every 6 (six) hours as needed for moderate pain (Patient not taking: Reported on 5/22/2024), Disp: 30 tablet, Rfl: 0    hydrOXYzine HCL (ATARAX) 50 mg tablet, Take 1 tablet (50 mg total) by mouth 3 (three) times a day as needed for itching (Patient not taking: Reported on 12/19/2023), Disp: 30 tablet, Rfl: 0    imiquimod (ALDARA) 5 % cream, Apply 1 packet topically 3 (three) times a week (Patient not taking: Reported on 5/22/2024), Disp: 24 each, Rfl: 6    medroxyPROGESTERone (PROVERA) 10 mg tablet, Take 1 tablet (10 mg total) by mouth daily (Patient not taking: Reported on 5/22/2024), Disp: 30 tablet, Rfl: 2     methocarbamol (ROBAXIN) 750 mg tablet, Take 2 tablets (1,500 mg total) by mouth daily at bedtime (Patient not taking: Reported on 2023), Disp: 20 tablet, Rfl: 0    methylPREDNISolone 4 MG tablet therapy pack, 24mg PO on day 1, then decrease by 4mg/day x 5 days per dose pack instructions. (Patient not taking: Reported on 2024), Disp: 21 tablet, Rfl: 0    miSOPROStol (Cytotec) 200 mcg tablet, Take one tablet orally the night prior to procedure then Take one tablet insert intravaginally the morning of procedure at 5:30am (Patient not taking: Reported on 2024), Disp: 2 tablet, Rfl: 0    miSOPROStol (Cytotec) 200 mcg tablet, Take one tablet orally the night prior to procedure then Take one tablet insert intravaginally the morning of the procedure at 5:30am (Patient not taking: Reported on 2024), Disp: 2 tablet, Rfl: 0    Restasis 0.05 % ophthalmic emulsion, , Disp: , Rfl:     Past Medical History:       Past Medical History:   Diagnosis Date    Abnormal Pap smear of cervix      colpo WNL;     Anxiety     no meds since 2018    Gonorrhea     Heart problem 2017    swelling of L side of heart - hospitalized x7 days @[place], unsure of specifics, no issues thereafter        Past Surgical History:   Procedure Laterality Date     SECTION      TUBAL LIGATION          Family History   Problem Relation Age of Onset    Hypertension Mother     Uterine cancer Mother 59    Hypertension Father     No Known Problems Sister     No Known Problems Sister     No Known Problems Daughter     No Known Problems Daughter     Throat cancer Maternal Uncle 45    No Known Problems Son     Breast cancer Neg Hx     Colon cancer Neg Hx     Ovarian cancer Neg Hx         Social History     Socioeconomic History    Marital status: Single     Spouse name: Not on file    Number of children: Not on file    Years of education: Not on file    Highest education level: Not on file   Occupational History    Not on  file   Tobacco Use    Smoking status: Former     Current packs/day: 0.00     Types: Cigars, Cigarettes     Quit date:      Years since quittin.5     Passive exposure: Past    Smokeless tobacco: Never    Tobacco comments:     Uses hooka now   Vaping Use    Vaping status: Never Used   Substance and Sexual Activity    Alcohol use: Yes     Comment: socially    Drug use: Not Currently     Types: Marijuana     Comment: daily marijuana use    Sexual activity: Not Currently     Partners: Male     Birth control/protection: Female Sterilization   Other Topics Concern    Not on file   Social History Narrative    Not on file     Social Determinants of Health     Financial Resource Strain: Low Risk  (6/3/2024)    Received from [facility]    Overall Financial Resource Strain (CARDIA)     Difficulty of Paying Living Expenses: Not hard at all   Food Insecurity: No Food Insecurity (6/3/2024)    Received from [facility]    Hunger Vital Sign     Worried About Running Out of Food in the Last Year: Never true     Ran Out of Food in the Last Year: Never true   Transportation Needs: No Transportation Needs (6/3/2024)    Received from [facility]    PRAPARE - Transportation     Lack of Transportation (Medical): No     Lack of Transportation (Non-Medical): No   Physical Activity: Not on file   Stress: Stress Concern Present (6/3/2024)    Received from [facility]    [employer] - Occupational Stress Questionnaire     Feeling of Stress : To some extent   Social Connections: Feeling Socially Integrated (6/3/2024)    Received from [facility]    OASIS : Social Isolation     How often do you feel lonely or isolated from those around you?: Never   Intimate Partner Violence: Not At Risk (6/3/2024)    Received from [facility]    Humiliation, Afraid, Rape, and Kick questionnaire     Fear of Current or  "Ex-Partner: No     Emotionally Abused: No     Physically Abused: No     Sexually Abused: No   Housing Stability: Unknown (6/3/2024)    Received from [facility]    Housing Stability Vital Sign     Unable to Pay for Housing in the Last Year: No     Number of Times Moved in the Last Year: Not on file     Homeless in the Last Year: No   Recent Concern: Housing Stability - High Risk (5/22/2024)    Housing Stability Vital Sign     Unable to Pay for Housing in the Last Year: Yes     Number of Times Moved in the Last Year: 0     Homeless in the Last Year: No        Physical Exam:     /74 (BP Location: Left arm, Patient Position: Sitting, Cuff Size: Standard)   Pulse 82   Temp 98 °F (36.7 °C) (Temporal)   Resp 16   Ht 5' 3\" (1.6 m)   Wt 86.6 kg (191 lb)   LMP 06/12/2024 (Approximate)   SpO2 99%   BMI 33.83 kg/m²     Physical Exam  Vitals reviewed.   Constitutional:       General: She is not in acute distress.     Appearance: Normal appearance.   Cardiovascular:      Rate and Rhythm: Normal rate.      Heart sounds: Normal heart sounds.   Pulmonary:      Effort: Pulmonary effort is normal.      Breath sounds: Normal breath sounds.   Skin:     General: Skin is warm.   Neurological:      Mental Status: She is alert.   Psychiatric:         Behavior: Behavior is cooperative.          Data:     Pre-operative work-up    Laboratory Results: I have personally reviewed the pertinent laboratory results/reports      EKG:  not necessary    Chest x-ray:  not necessary      Previous cardiopulmonary studies within the past year:  Echocardiogram: not necessary  Cardiac Catheterization: none  Stress Test: none  Pulmonary Function Testing: none      Assessment & Recommendations:     1. Encounter for immunization            Pre-Op Evaluation Assessment  38 y.o. female with planned surgery: MRI of brain under anaesthesia.    Known risk factors for perioperative complications: Anemia.        Current medications " which may produce withdrawal symptoms if withheld perioperatively: none.    Pre-Op Evaluation Plan  1. Further preoperative workup as follows:   - None; no further preoperative work-up is required    2. Medication Management/Recommendations: No need to take any meds prior to procedure.      3. Prophylaxis for cardiac events with perioperative beta-blockers: not indicated.    4. Patient requires further consultation with: None    Clearance  Patient is CLEARED for surgery without any additional cardiac testing.    Patient is low risk for a moderate risk procedure.   Patient is medically stable for planned procedure as detailed above.     Glenny Loco MD  [address], [address] [address]  Phone#  [phone]  Fax#  [phone]

## 2024-07-09 NOTE — H&P (VIEW-ONLY)
Family Practice Pre-Operative Evaluation      Chief Complaint: Pre-operative Evaluation     Surgery: MRI for brain  Anticipated Date of Surgery: 07/17/2024  Referring Provider: Self, Referral       History of Present Illness:     Aleksey Johnson is a 38 y.o. female who presents to the office today for a preoperative consultation at the request of surgeon, who plans on performing brain MRI on 06/17/2024. Planned anesthesia is conscious sedation with anaesthesia standby. . Patient has a bleeding risk of: recent abnormal bleeding (Abnormal uterine bleeding). Patient does not have objections to receiving blood products if needed. Current anti-platelet/anti-coagulation medications that the patient is prescribed includes:  NONE .      Assessment of Chronic Conditions:   - anxiety and depression. Abnormal uterine bleeding.     Assessment of Cardiac Risk:  Denies unstable or severe angina or MI in the last 6 weeks or history of stent placement in the last year   Denies decompensated heart failure (e.g. New onset heart failure, NYHA functional class IV heart failure, or worsening existing heart failure)  Denies significant arrhythmias such as high grade AV block, symptomatic ventricular arrhythmia, newly recognized ventricular tachycardia, supraventricular tachycardia with resting heart rate >100, or symptomatic bradycardia  Denies severe heart valve disease including aortic stenosis or symptomatic mitral stenosis     Exercise Capacity:  Able to walk 4 blocks without symptoms?: Yes  Able to walk 2 flights without symptoms?: Yes    Prior Anesthesia Reactions: No     Personal history of venous thromboembolic disease? No    History of steroid use for >2 weeks within last year? Yes         Review of Systems:     Review of Systems   Constitutional:  Negative for chills and fever.   HENT:  Negative for ear pain and sore throat.    Eyes:  Negative for pain and visual disturbance.   Respiratory:  Negative for cough and  shortness of breath.    Cardiovascular:  Negative for chest pain and palpitations.   Gastrointestinal:  Negative for abdominal pain and vomiting.   Genitourinary:  Negative for dysuria and hematuria.   Musculoskeletal:  Negative for arthralgias and back pain.   Skin:  Negative for color change and rash.   Neurological:  Positive for headaches. Negative for seizures and syncope.   All other systems reviewed and are negative.      Current Problem List:     Patient Active Problem List   Diagnosis    Anxiety and depression    Other chest pain    Chronic anemia    Chronic low back pain    Swelling of joint of upper arm, right    Acute pain of right wrist    Iron deficiency anemia due to chronic blood loss       Allergies:     No Known Allergies    Current Medications:       Current Outpatient Medications:     acetaminophen (TYLENOL) 650 mg CR tablet, Take 1 tablet (650 mg total) by mouth every 8 (eight) hours as needed for mild pain, Disp: 30 tablet, Rfl: 0    DULoxetine (CYMBALTA) 30 mg delayed release capsule, Take 1 capsule (30 mg total) by mouth daily, Disp: 30 capsule, Rfl: 2    hydrOXYzine HCL (ATARAX) 25 mg tablet, Take 1 tablet (25 mg total) by mouth daily as needed for anxiety, Disp: 30 tablet, Rfl: 0    naproxen (Naprosyn) 500 mg tablet, Take 1 tablet (500 mg total) by mouth 2 (two) times a day with meals for 14 days, Disp: 28 tablet, Rfl: 0    acetaminophen (TYLENOL) 325 mg tablet, Take 2 tablets (650 mg total) by mouth every 6 (six) hours as needed for moderate pain (Patient not taking: Reported on 5/22/2024), Disp: 30 tablet, Rfl: 0    imiquimod (ALDARA) 5 % cream, Apply 1 packet topically 3 (three) times a week (Patient not taking: Reported on 5/22/2024), Disp: 24 each, Rfl: 6    medroxyPROGESTERone (PROVERA) 10 mg tablet, Take 1 tablet (10 mg total) by mouth daily (Patient not taking: Reported on 5/22/2024), Disp: 30 tablet, Rfl: 2    methocarbamol (ROBAXIN) 750 mg tablet, Take 2 tablets (1,500 mg total)  by mouth daily at bedtime (Patient not taking: Reported on 2023), Disp: 20 tablet, Rfl: 0    methylPREDNISolone 4 MG tablet therapy pack, 24mg PO on day 1, then decrease by 4mg/day x 5 days per dose pack instructions. (Patient not taking: Reported on 2024), Disp: 21 tablet, Rfl: 0    miSOPROStol (Cytotec) 200 mcg tablet, Take one tablet orally the night prior to procedure then Take one tablet insert intravaginally the morning of procedure at 5:30am (Patient not taking: Reported on 2024), Disp: 2 tablet, Rfl: 0    miSOPROStol (Cytotec) 200 mcg tablet, Take one tablet orally the night prior to procedure then Take one tablet insert intravaginally the morning of the procedure at 5:30am (Patient not taking: Reported on 2024), Disp: 2 tablet, Rfl: 0    Restasis 0.05 % ophthalmic emulsion, , Disp: , Rfl:     Past Medical History:       Past Medical History:   Diagnosis Date    Abnormal Pap smear of cervix      colpo WNL;     Anxiety     no meds since 2018    Gonorrhea     Heart problem 2017    swelling of L side of heart - hospitalized x7 days @[place], unsure of specifics, no issues thereafter        Past Surgical History:   Procedure Laterality Date     SECTION      TUBAL LIGATION          Family History   Problem Relation Age of Onset    Hypertension Mother     Uterine cancer Mother 59    Hypertension Father     No Known Problems Sister     No Known Problems Sister     No Known Problems Daughter     No Known Problems Daughter     Throat cancer Maternal Uncle 45    No Known Problems Son     Breast cancer Neg Hx     Colon cancer Neg Hx     Ovarian cancer Neg Hx         Social History     Socioeconomic History    Marital status: Single     Spouse name: Not on file    Number of children: Not on file    Years of education: Not on file    Highest education level: Not on file   Occupational History    Not on file   Tobacco Use    Smoking status: Former     Current packs/day:  0.00     Types: Cigars, Cigarettes     Quit date:      Years since quittin.5     Passive exposure: Past    Smokeless tobacco: Never    Tobacco comments:     Uses hooka now   Vaping Use    Vaping status: Never Used   Substance and Sexual Activity    Alcohol use: Yes     Comment: socially    Drug use: Not Currently     Types: Marijuana     Comment: daily marijuana use    Sexual activity: Not Currently     Partners: Male     Birth control/protection: Female Sterilization   Other Topics Concern    Not on file   Social History Narrative    Not on file     Social Determinants of Health     Financial Resource Strain: Low Risk  (6/3/2024)    Received from [facility]    Overall Financial Resource Strain (CARDIA)     Difficulty of Paying Living Expenses: Not hard at all   Food Insecurity: No Food Insecurity (6/3/2024)    Received from [facility]    Hunger Vital Sign     Worried About Running Out of Food in the Last Year: Never true     Ran Out of Food in the Last Year: Never true   Transportation Needs: No Transportation Needs (6/3/2024)    Received from [facility]    PRAPARE - Transportation     Lack of Transportation (Medical): No     Lack of Transportation (Non-Medical): No   Physical Activity: Not on file   Stress: Stress Concern Present (6/3/2024)    Received from [facility]    [employer] - Occupational Stress Questionnaire     Feeling of Stress : To some extent   Social Connections: Feeling Socially Integrated (6/3/2024)    Received from [facility]    OASIS : Social Isolation     How often do you feel lonely or isolated from those around you?: Never   Intimate Partner Violence: Not At Risk (6/3/2024)    Received from [facility]    Humiliation, Afraid, Rape, and Kick questionnaire     Fear of Current or Ex-Partner: No     Emotionally Abused: No     Physically Abused: No     " Sexually Abused: No   Housing Stability: Unknown (6/3/2024)    Received from [facility]    Housing Stability Vital Sign     Unable to Pay for Housing in the Last Year: No     Number of Times Moved in the Last Year: Not on file     Homeless in the Last Year: No   Recent Concern: Housing Stability - High Risk (5/22/2024)    Housing Stability Vital Sign     Unable to Pay for Housing in the Last Year: Yes     Number of Times Moved in the Last Year: 0     Homeless in the Last Year: No        Physical Exam:     /74 (BP Location: Left arm, Patient Position: Sitting, Cuff Size: Standard)   Pulse 82   Temp 98 °F (36.7 °C) (Temporal)   Resp 16   Ht 5' 3\" (1.6 m)   Wt 86.6 kg (191 lb)   LMP 06/12/2024 (Approximate)   SpO2 99%   BMI 33.83 kg/m²     Physical Exam  Vitals reviewed.   Constitutional:       General: She is not in acute distress.     Appearance: Normal appearance.   Cardiovascular:      Rate and Rhythm: Normal rate.      Heart sounds: Normal heart sounds.   Pulmonary:      Effort: Pulmonary effort is normal.      Breath sounds: Normal breath sounds.   Skin:     General: Skin is warm.   Neurological:      Mental Status: She is alert.   Psychiatric:         Behavior: Behavior is cooperative.          Data:     Pre-operative work-up    Laboratory Results: I have personally reviewed the pertinent laboratory results/reports      EKG:  not necessary    Chest x-ray:  not necessary      Previous cardiopulmonary studies within the past year:  Echocardiogram: not necessary  Cardiac Catheterization: none  Stress Test: none  Pulmonary Function Testing: none      Assessment & Recommendations:     1. Pre-op examination        2. Anxiety and depression  hydrOXYzine HCL (ATARAX) 25 mg tablet    DISCONTINUED: hydrOXYzine HCL (ATARAX) 50 mg tablet    Refilled hydroxyzine and will require more detailed follow-up to further assess treatment of this problem.          Pre-Op Evaluation Assessment  38 y.o. " female with planned surgery: MRI of brain under anaesthesia.    Known risk factors for perioperative complications: Anemia.        Current medications which may produce withdrawal symptoms if withheld perioperatively: none.    Pre-Op Evaluation Plan  1. Further preoperative workup as follows:   - None; no further preoperative work-up is required    2. Medication Management/Recommendations: No need to take any meds prior to procedure.      3. Prophylaxis for cardiac events with perioperative beta-blockers: not indicated.    4. Patient requires further consultation with: None    Clearance  Patient is CLEARED for surgery without any additional cardiac testing.    Patient is low risk for a moderate risk procedure.   Patient is medically stable for planned procedure as detailed above.     Glenny Loco MD  [address], [address] [place] [id number]  Phone#  [phone]  Fax#  [phone]

## 2024-07-09 NOTE — PROGRESS NOTES
Family Practice Pre-Operative Evaluation      Chief Complaint: Pre-operative Evaluation     Surgery: MRI for brain  Anticipated Date of Surgery: 07/17/2024  Referring Provider: Self, Referral       History of Present Illness:     Aleksey Johnson is a 38 y.o. female who presents to the office today for a preoperative consultation at the request of surgeon, who plans on performing brain MRI on 06/17/2024. Planned anesthesia is conscious sedation with anaesthesia standby. . Patient has a bleeding risk of: recent abnormal bleeding (Abnormal uterine bleeding). Patient does not have objections to receiving blood products if needed. Current anti-platelet/anti-coagulation medications that the patient is prescribed includes:  NONE .      Assessment of Chronic Conditions:   - anxiety and depression. Abnormal uterine bleeding.     Assessment of Cardiac Risk:  Denies unstable or severe angina or MI in the last 6 weeks or history of stent placement in the last year   Denies decompensated heart failure (e.g. New onset heart failure, NYHA functional class IV heart failure, or worsening existing heart failure)  Denies significant arrhythmias such as high grade AV block, symptomatic ventricular arrhythmia, newly recognized ventricular tachycardia, supraventricular tachycardia with resting heart rate >100, or symptomatic bradycardia  Denies severe heart valve disease including aortic stenosis or symptomatic mitral stenosis     Exercise Capacity:  Able to walk 4 blocks without symptoms?: Yes  Able to walk 2 flights without symptoms?: Yes    Prior Anesthesia Reactions: No     Personal history of venous thromboembolic disease? No    History of steroid use for >2 weeks within last year? Yes         Review of Systems:     Review of Systems   Constitutional:  Negative for chills and fever.   HENT:  Negative for ear pain and sore throat.    Eyes:  Negative for pain and visual disturbance.   Respiratory:  Negative for cough and  shortness of breath.    Cardiovascular:  Negative for chest pain and palpitations.   Gastrointestinal:  Negative for abdominal pain and vomiting.   Genitourinary:  Negative for dysuria and hematuria.   Musculoskeletal:  Negative for arthralgias and back pain.   Skin:  Negative for color change and rash.   Neurological:  Positive for headaches. Negative for seizures and syncope.   All other systems reviewed and are negative.      Current Problem List:     Patient Active Problem List   Diagnosis    Anxiety and depression    Other chest pain    Chronic anemia    Chronic low back pain    Swelling of joint of upper arm, right    Acute pain of right wrist    Iron deficiency anemia due to chronic blood loss       Allergies:     No Known Allergies    Current Medications:       Current Outpatient Medications:     acetaminophen (TYLENOL) 650 mg CR tablet, Take 1 tablet (650 mg total) by mouth every 8 (eight) hours as needed for mild pain, Disp: 30 tablet, Rfl: 0    DULoxetine (CYMBALTA) 30 mg delayed release capsule, Take 1 capsule (30 mg total) by mouth daily, Disp: 30 capsule, Rfl: 2    hydrOXYzine HCL (ATARAX) 25 mg tablet, Take 1 tablet (25 mg total) by mouth daily as needed for anxiety, Disp: 30 tablet, Rfl: 0    naproxen (Naprosyn) 500 mg tablet, Take 1 tablet (500 mg total) by mouth 2 (two) times a day with meals for 14 days, Disp: 28 tablet, Rfl: 0    acetaminophen (TYLENOL) 325 mg tablet, Take 2 tablets (650 mg total) by mouth every 6 (six) hours as needed for moderate pain (Patient not taking: Reported on 5/22/2024), Disp: 30 tablet, Rfl: 0    imiquimod (ALDARA) 5 % cream, Apply 1 packet topically 3 (three) times a week (Patient not taking: Reported on 5/22/2024), Disp: 24 each, Rfl: 6    medroxyPROGESTERone (PROVERA) 10 mg tablet, Take 1 tablet (10 mg total) by mouth daily (Patient not taking: Reported on 5/22/2024), Disp: 30 tablet, Rfl: 2    methocarbamol (ROBAXIN) 750 mg tablet, Take 2 tablets (1,500 mg total)  by mouth daily at bedtime (Patient not taking: Reported on 2023), Disp: 20 tablet, Rfl: 0    methylPREDNISolone 4 MG tablet therapy pack, 24mg PO on day 1, then decrease by 4mg/day x 5 days per dose pack instructions. (Patient not taking: Reported on 2024), Disp: 21 tablet, Rfl: 0    miSOPROStol (Cytotec) 200 mcg tablet, Take one tablet orally the night prior to procedure then Take one tablet insert intravaginally the morning of procedure at 5:30am (Patient not taking: Reported on 2024), Disp: 2 tablet, Rfl: 0    miSOPROStol (Cytotec) 200 mcg tablet, Take one tablet orally the night prior to procedure then Take one tablet insert intravaginally the morning of the procedure at 5:30am (Patient not taking: Reported on 2024), Disp: 2 tablet, Rfl: 0    Restasis 0.05 % ophthalmic emulsion, , Disp: , Rfl:     Past Medical History:       Past Medical History:   Diagnosis Date    Abnormal Pap smear of cervix      colpo WNL;     Anxiety     no meds since 2018    Gonorrhea     Heart problem 2017    swelling of L side of heart - hospitalized x7 days @[place], unsure of specifics, no issues thereafter        Past Surgical History:   Procedure Laterality Date     SECTION      TUBAL LIGATION          Family History   Problem Relation Age of Onset    Hypertension Mother     Uterine cancer Mother 59    Hypertension Father     No Known Problems Sister     No Known Problems Sister     No Known Problems Daughter     No Known Problems Daughter     Throat cancer Maternal Uncle 45    No Known Problems Son     Breast cancer Neg Hx     Colon cancer Neg Hx     Ovarian cancer Neg Hx         Social History     Socioeconomic History    Marital status: Single     Spouse name: Not on file    Number of children: Not on file    Years of education: Not on file    Highest education level: Not on file   Occupational History    Not on file   Tobacco Use    Smoking status: Former     Current packs/day:  0.00     Types: Cigars, Cigarettes     Quit date:      Years since quittin.5     Passive exposure: Past    Smokeless tobacco: Never    Tobacco comments:     Uses hooka now   Vaping Use    Vaping status: Never Used   Substance and Sexual Activity    Alcohol use: Yes     Comment: socially    Drug use: Not Currently     Types: Marijuana     Comment: daily marijuana use    Sexual activity: Not Currently     Partners: Male     Birth control/protection: Female Sterilization   Other Topics Concern    Not on file   Social History Narrative    Not on file     Social Determinants of Health     Financial Resource Strain: Low Risk  (6/3/2024)    Received from [facility]    Overall Financial Resource Strain (CARDIA)     Difficulty of Paying Living Expenses: Not hard at all   Food Insecurity: No Food Insecurity (6/3/2024)    Received from [facility]    Hunger Vital Sign     Worried About Running Out of Food in the Last Year: Never true     Ran Out of Food in the Last Year: Never true   Transportation Needs: No Transportation Needs (6/3/2024)    Received from [facility]    PRAPARE - Transportation     Lack of Transportation (Medical): No     Lack of Transportation (Non-Medical): No   Physical Activity: Not on file   Stress: Stress Concern Present (6/3/2024)    Received from [facility]    [employer] - Occupational Stress Questionnaire     Feeling of Stress : To some extent   Social Connections: Feeling Socially Integrated (6/3/2024)    Received from [facility]    OASIS : Social Isolation     How often do you feel lonely or isolated from those around you?: Never   Intimate Partner Violence: Not At Risk (6/3/2024)    Received from [facility]    Humiliation, Afraid, Rape, and Kick questionnaire     Fear of Current or Ex-Partner: No     Emotionally Abused: No     Physically Abused: No     " Sexually Abused: No   Housing Stability: Unknown (6/3/2024)    Received from [facility]    Housing Stability Vital Sign     Unable to Pay for Housing in the Last Year: No     Number of Times Moved in the Last Year: Not on file     Homeless in the Last Year: No   Recent Concern: Housing Stability - High Risk (5/22/2024)    Housing Stability Vital Sign     Unable to Pay for Housing in the Last Year: Yes     Number of Times Moved in the Last Year: 0     Homeless in the Last Year: No        Physical Exam:     /74 (BP Location: Left arm, Patient Position: Sitting, Cuff Size: Standard)   Pulse 82   Temp 98 °F (36.7 °C) (Temporal)   Resp 16   Ht 5' 3\" (1.6 m)   Wt 86.6 kg (191 lb)   LMP 06/12/2024 (Approximate)   SpO2 99%   BMI 33.83 kg/m²     Physical Exam  Vitals reviewed.   Constitutional:       General: She is not in acute distress.     Appearance: Normal appearance.   Cardiovascular:      Rate and Rhythm: Normal rate.      Heart sounds: Normal heart sounds.   Pulmonary:      Effort: Pulmonary effort is normal.      Breath sounds: Normal breath sounds.   Skin:     General: Skin is warm.   Neurological:      Mental Status: She is alert.   Psychiatric:         Behavior: Behavior is cooperative.          Data:     Pre-operative work-up    Laboratory Results: I have personally reviewed the pertinent laboratory results/reports      EKG:  not necessary    Chest x-ray:  not necessary      Previous cardiopulmonary studies within the past year:  Echocardiogram: not necessary  Cardiac Catheterization: none  Stress Test: none  Pulmonary Function Testing: none      Assessment & Recommendations:     1. Pre-op examination        2. Anxiety and depression  hydrOXYzine HCL (ATARAX) 25 mg tablet    DISCONTINUED: hydrOXYzine HCL (ATARAX) 50 mg tablet    Refilled hydroxyzine and will require more detailed follow-up to further assess treatment of this problem.          Pre-Op Evaluation Assessment  38 y.o. " female with planned surgery: MRI of brain under anaesthesia.    Known risk factors for perioperative complications: Anemia.        Current medications which may produce withdrawal symptoms if withheld perioperatively: none.    Pre-Op Evaluation Plan  1. Further preoperative workup as follows:   - None; no further preoperative work-up is required    2. Medication Management/Recommendations: No need to take any meds prior to procedure.      3. Prophylaxis for cardiac events with perioperative beta-blockers: not indicated.    4. Patient requires further consultation with: None    Clearance  Patient is CLEARED for surgery without any additional cardiac testing.    Patient is low risk for a moderate risk procedure.   Patient is medically stable for planned procedure as detailed above.     Glenny Loco MD  [address], [address] [place] [id number]  Phone#  [phone]  Fax#  [phone]

## 2024-07-11 ENCOUNTER — HOSPITAL ENCOUNTER (OUTPATIENT)
Dept: INFUSION CENTER | Facility: HOSPITAL | Age: 39
End: 2024-07-11
Payer: MEDICARE

## 2024-07-11 VITALS
SYSTOLIC BLOOD PRESSURE: 94 MMHG | TEMPERATURE: 97.1 F | DIASTOLIC BLOOD PRESSURE: 64 MMHG | RESPIRATION RATE: 18 BRPM | HEART RATE: 81 BPM

## 2024-07-11 DIAGNOSIS — D64.9 CHRONIC ANEMIA: ICD-10-CM

## 2024-07-11 DIAGNOSIS — D50.0 IRON DEFICIENCY ANEMIA DUE TO CHRONIC BLOOD LOSS: Primary | ICD-10-CM

## 2024-07-11 PROCEDURE — 96365 THER/PROPH/DIAG IV INF INIT: CPT

## 2024-07-11 RX ORDER — SODIUM CHLORIDE 9 MG/ML
20 INJECTION, SOLUTION INTRAVENOUS ONCE
Status: COMPLETED | OUTPATIENT
Start: 2024-07-11 | End: 2024-07-11

## 2024-07-11 RX ORDER — SODIUM CHLORIDE 9 MG/ML
20 INJECTION, SOLUTION INTRAVENOUS ONCE
OUTPATIENT
Start: 2024-07-16

## 2024-07-11 RX ADMIN — SODIUM CHLORIDE 20 ML/HR: 0.9 INJECTION, SOLUTION INTRAVENOUS at 09:56

## 2024-07-11 RX ADMIN — IRON SUCROSE 200 MG: 20 INJECTION, SOLUTION INTRAVENOUS at 09:59

## 2024-07-11 NOTE — PROGRESS NOTES
Pt tolerated venofer infusion without difficulty.  No s/s reaction noted.  Confirmed next appt on 7/16 at 0900.  AVS declined.  Left ambulatory in stable condition.

## 2024-07-17 ENCOUNTER — HOSPITAL ENCOUNTER (OUTPATIENT)
Dept: RADIOLOGY | Facility: HOSPITAL | Age: 39
Discharge: HOME | End: 2024-07-17
Attending: STUDENT IN AN ORGANIZED HEALTH CARE EDUCATION/TRAINING PROGRAM
Payer: MEDICARE

## 2024-07-17 ENCOUNTER — ANESTHESIA EVENT (OUTPATIENT)
Dept: RADIOLOGY | Facility: HOSPITAL | Age: 39
End: 2024-07-17

## 2024-07-17 ENCOUNTER — ANESTHESIA (OUTPATIENT)
Dept: RADIOLOGY | Facility: HOSPITAL | Age: 39
End: 2024-07-17

## 2024-07-17 VITALS
DIASTOLIC BLOOD PRESSURE: 83 MMHG | TEMPERATURE: 97.3 F | HEART RATE: 66 BPM | SYSTOLIC BLOOD PRESSURE: 122 MMHG | OXYGEN SATURATION: 100 % | BODY MASS INDEX: 33.66 KG/M2 | WEIGHT: 190 LBS | RESPIRATION RATE: 14 BRPM | HEIGHT: 63 IN

## 2024-07-17 DIAGNOSIS — G43.009 MIGRAINE WITHOUT AURA AND WITHOUT STATUS MIGRAINOSUS, NOT INTRACTABLE: ICD-10-CM

## 2024-07-17 LAB
EXT PREGNANCY TEST URINE: NEGATIVE
EXT. CONTROL: NORMAL

## 2024-07-17 PROCEDURE — 70553 MRI BRAIN STEM W/O & W/DYE: CPT

## 2024-07-17 PROCEDURE — 81025 URINE PREGNANCY TEST: CPT | Performed by: ANESTHESIOLOGY

## 2024-07-17 PROCEDURE — A9585 GADOBUTROL INJECTION: HCPCS | Performed by: PHYSICIAN ASSISTANT

## 2024-07-17 PROCEDURE — G1004 CDSM NDSC: HCPCS

## 2024-07-17 RX ORDER — LIDOCAINE HYDROCHLORIDE 10 MG/ML
INJECTION, SOLUTION EPIDURAL; INFILTRATION; INTRACAUDAL; PERINEURAL AS NEEDED
Status: DISCONTINUED | OUTPATIENT
Start: 2024-07-17 | End: 2024-07-17

## 2024-07-17 RX ORDER — LIDOCAINE HYDROCHLORIDE 10 MG/ML
0.5 INJECTION, SOLUTION EPIDURAL; INFILTRATION; INTRACAUDAL; PERINEURAL ONCE AS NEEDED
Status: DISCONTINUED | OUTPATIENT
Start: 2024-07-17 | End: 2024-07-18 | Stop reason: HOSPADM

## 2024-07-17 RX ORDER — GADOBUTROL 604.72 MG/ML
8 INJECTION INTRAVENOUS
Status: COMPLETED | OUTPATIENT
Start: 2024-07-17 | End: 2024-07-17

## 2024-07-17 RX ORDER — PROPOFOL 10 MG/ML
INJECTION, EMULSION INTRAVENOUS AS NEEDED
Status: DISCONTINUED | OUTPATIENT
Start: 2024-07-17 | End: 2024-07-17

## 2024-07-17 RX ORDER — SODIUM CHLORIDE, SODIUM LACTATE, POTASSIUM CHLORIDE, CALCIUM CHLORIDE 600; 310; 30; 20 MG/100ML; MG/100ML; MG/100ML; MG/100ML
125 INJECTION, SOLUTION INTRAVENOUS CONTINUOUS
Status: DISCONTINUED | OUTPATIENT
Start: 2024-07-17 | End: 2024-07-17

## 2024-07-17 RX ADMIN — SODIUM CHLORIDE, SODIUM LACTATE, POTASSIUM CHLORIDE, AND CALCIUM CHLORIDE: .6; .31; .03; .02 INJECTION, SOLUTION INTRAVENOUS at 11:58

## 2024-07-17 RX ADMIN — GADOBUTROL 8 ML: 604.72 INJECTION INTRAVENOUS at 11:57

## 2024-07-17 RX ADMIN — SODIUM CHLORIDE, SODIUM LACTATE, POTASSIUM CHLORIDE, AND CALCIUM CHLORIDE 125 ML/HR: .6; .31; .03; .02 INJECTION, SOLUTION INTRAVENOUS at 10:28

## 2024-07-17 RX ADMIN — PROPOFOL 200 MG: 10 INJECTION, EMULSION INTRAVENOUS at 11:23

## 2024-07-17 RX ADMIN — LIDOCAINE HYDROCHLORIDE 50 MG: 10 INJECTION, SOLUTION EPIDURAL; INFILTRATION; INTRACAUDAL; PERINEURAL at 11:23

## 2024-07-17 NOTE — ANESTHESIA PREPROCEDURE EVALUATION
Procedure:  MRI BRAIN W WO CONTRAST    Relevant Problems   CARDIO   (+) Other chest pain      HEMATOLOGY   (+) Chronic anemia   (+) Iron deficiency anemia due to chronic blood loss      MUSCULOSKELETAL   (+) Chronic low back pain      NEURO/PSYCH   (+) Anxiety and depression   (+) Chronic low back pain      PULMONARY   (-) Asthma   (-) URI (upper respiratory infection)   1/20-EF-60%     Physical Exam    Airway    Mallampati score: II  TM Distance: >3 FB  Neck ROM: full     Dental   No notable dental hx     Cardiovascular      Pulmonary   Breath sounds clear to auscultation    Other Findings  post-pubertal.      Anesthesia Plan  ASA Score- 2     Anesthesia Type- general with ASA Monitors.         Additional Monitors:     Airway Plan: LMA.           Plan Factors-Exercise tolerance (METS): >4 METS.    Chart reviewed. EKG reviewed.       Patient is a current smoker (cigars).              Induction- intravenous.    Postoperative Plan-         Informed Consent- Anesthetic plan and risks discussed with patient.  I personally reviewed this patient with the CRNA. Discussed and agreed on the Anesthesia Plan with the CRNA..

## 2024-07-17 NOTE — INTERVAL H&P NOTE
H&P reviewed. After examining the patient I find no changes in the patients condition since the H&P had been written.    Vitals:    07/17/24 1017   BP: 115/86   Pulse: 76   Temp: (!) 96.8 °F (36 °C)   SpO2: 99%

## 2024-07-17 NOTE — NURSING NOTE
MRI brain w/without contrast complete. Pt tolerated well.    VSS post procedure. Pt alert and oriented on room air. No complaints or visible signs of distress.    Report given to   Antonella [facility] RN.  Transported back to [facility]

## 2024-07-17 NOTE — ANESTHESIA POSTPROCEDURE EVALUATION
Post-Op Assessment Note    CV Status:  Stable  Pain Score: 0    Pain management: adequate       Mental Status:  Awake   Hydration Status:  Stable   PONV Controlled:  None   Airway Patency:  Patent     Post Op Vitals Reviewed: Yes    No anethesia notable event occurred.    Staff: Anesthesiologist, CRNA               BP      Temp      Pulse     Resp      SpO2      REPORT TO MRI RN, VSS ON MONITORS, SV ON RA.  SEE RN NOTE FOR VS.

## 2024-07-17 NOTE — INTERVAL H&P NOTE
H&P reviewed. After examining the patient I find no changes in the patients condition since the H&P had been written.    Vitals:    07/17/24 1017   BP: 115/86   Pulse: 76   Resp: 20   Temp: (!) 96.8 °F (36 °C)   SpO2: 99%   Abdomen -Soft, Extremities-NAD

## 2024-07-19 ENCOUNTER — TELEPHONE (OUTPATIENT)
Dept: INFUSION CENTER | Facility: HOSPITAL | Age: 39
End: 2024-07-19

## 2024-07-22 ENCOUNTER — HOSPITAL ENCOUNTER (OUTPATIENT)
Dept: INFUSION CENTER | Facility: HOSPITAL | Age: 39
Discharge: HOME | End: 2024-07-22

## 2024-07-25 ENCOUNTER — PROCEDURE VISIT (OUTPATIENT)
Dept: OBGYN CLINIC | Facility: CLINIC | Age: 39
End: 2024-07-25

## 2024-07-25 VITALS
SYSTOLIC BLOOD PRESSURE: 113 MMHG | WEIGHT: 192.8 LBS | HEIGHT: 63 IN | HEART RATE: 97 BPM | DIASTOLIC BLOOD PRESSURE: 76 MMHG | BODY MASS INDEX: 34.16 KG/M2

## 2024-07-25 DIAGNOSIS — N93.9 ABNORMAL UTERINE BLEEDING (AUB): Primary | ICD-10-CM

## 2024-07-25 DIAGNOSIS — Z20.2 POSSIBLE EXPOSURE TO STD: ICD-10-CM

## 2024-07-25 LAB — SL AMB POCT URINE HCG: NEGATIVE

## 2024-07-25 PROCEDURE — 88305 TISSUE EXAM BY PATHOLOGIST: CPT | Performed by: PATHOLOGY

## 2024-07-25 RX ORDER — IBUPROFEN 600 MG/1
600 TABLET ORAL ONCE
Status: COMPLETED | OUTPATIENT
Start: 2024-07-25 | End: 2024-07-25

## 2024-07-25 RX ADMIN — IBUPROFEN 600 MG: 600 TABLET ORAL at 13:57

## 2024-07-26 ENCOUNTER — TELEPHONE (OUTPATIENT)
Dept: GYNECOLOGY | Facility: CLINIC | Age: 39
End: 2024-07-26

## 2024-07-26 NOTE — TELEPHONE ENCOUNTER
----- Message from Yardlie Toussaint-Foster, DO sent at 2024  8:07 AM EDT -----  [facility] GYN Department  Surgery Scheduling Sheet    Patient Name: Aleksey Johnson  : 1985    Provider: Yardlie Toussaint-Foster, DO     Needed: no; Language: N/A    Procedure: exam under anesthesia, total laparoscopic hysterectomy, bilateral salpingectomy, and all other indicated procedures    Diagnosis: AUB and Symptomatic Fibroid Uterus    Special Needs or Equipment: none    Anesthesia: General anesthesia    Length of stay: outpatient  Does patient have comorbid conditions that will require close perioperative monitoring prior to safe discharge: no    The patient has comorbid conditions that will require close perioperative monitoring prior to safe discharge, including N/A.   This may require acute care beyond the usual and routine recovery period. As such, inpatient admission post-operatively is expected and appropriate, and anticipated hospital length of stay will be >2 midnights.    Pre-Admission Testing Needed: yes   Labs that should be ordered: cbc, type and screen, and BMP    Order PAT that is recommended in prep for procedure?: Yes    Medical Clearance Needed: no; Provider: N/A    MA Form Signed (tubals/hysterectomy): Yes    Surgical Drink Given: no     How many days out of work: 6 week(s)     How many days no drivin week(s)       Is pre op appt needed?  yes  Interval for post op appt: 2 week(s)     For Surgical Scheduler:     Surgery Scheduled On:  [place]: [facility]    Pre-op Appt:   Post op Appt:  Consult/Medical clearance appt:

## 2024-07-29 PROCEDURE — 88305 TISSUE EXAM BY PATHOLOGIST: CPT | Performed by: PATHOLOGY

## 2024-08-05 ENCOUNTER — APPOINTMENT (OUTPATIENT)
Dept: LAB | Facility: HOSPITAL | Age: 39
End: 2024-08-05
Payer: MEDICARE

## 2024-08-05 DIAGNOSIS — Z20.2 POSSIBLE EXPOSURE TO STD: ICD-10-CM

## 2024-08-05 PROCEDURE — 86706 HEP B SURFACE ANTIBODY: CPT

## 2024-08-05 PROCEDURE — 86803 HEPATITIS C AB TEST: CPT

## 2024-08-05 PROCEDURE — 87389 HIV-1 AG W/HIV-1&-2 AB AG IA: CPT

## 2024-08-05 PROCEDURE — 36415 COLL VENOUS BLD VENIPUNCTURE: CPT

## 2024-08-05 PROCEDURE — 86780 TREPONEMA PALLIDUM: CPT

## 2024-08-06 LAB
HBV SURFACE AB SER-ACNC: 167 MIU/ML
HCV AB SER QL: REACTIVE
HIV 1+2 AB+HIV1 P24 AG SERPL QL IA: NON REACTIVE
TREPONEMA PALLIDUM IGG+IGM AB [PRESENCE] IN SERUM OR PLASMA BY IMMUNOASSAY: NORMAL

## 2024-10-03 ENCOUNTER — ANNUAL EXAM (OUTPATIENT)
Dept: OBGYN CLINIC | Facility: CLINIC | Age: 39
End: 2024-10-03

## 2024-10-03 VITALS
BODY MASS INDEX: 34.02 KG/M2 | DIASTOLIC BLOOD PRESSURE: 81 MMHG | HEIGHT: 63 IN | WEIGHT: 192 LBS | SYSTOLIC BLOOD PRESSURE: 115 MMHG | HEART RATE: 88 BPM

## 2024-10-03 DIAGNOSIS — R76.8 POSITIVE HEPATITIS C ANTIBODY TEST: ICD-10-CM

## 2024-10-03 DIAGNOSIS — Z12.4 SCREENING FOR CERVICAL CANCER: ICD-10-CM

## 2024-10-03 DIAGNOSIS — N76.0 BV (BACTERIAL VAGINOSIS): ICD-10-CM

## 2024-10-03 DIAGNOSIS — A63.0 GENITAL WARTS: ICD-10-CM

## 2024-10-03 DIAGNOSIS — Z12.31 ENCOUNTER FOR SCREENING MAMMOGRAM FOR MALIGNANT NEOPLASM OF BREAST: ICD-10-CM

## 2024-10-03 DIAGNOSIS — B96.89 BV (BACTERIAL VAGINOSIS): ICD-10-CM

## 2024-10-03 DIAGNOSIS — Z01.419 ROUTINE GYNECOLOGICAL EXAMINATION: Primary | ICD-10-CM

## 2024-10-03 DIAGNOSIS — Z11.3 SCREEN FOR STD (SEXUALLY TRANSMITTED DISEASE): ICD-10-CM

## 2024-10-03 DIAGNOSIS — N89.8 VAGINAL DISCHARGE: ICD-10-CM

## 2024-10-03 PROCEDURE — 87210 SMEAR WET MOUNT SALINE/INK: CPT | Performed by: OBSTETRICS & GYNECOLOGY

## 2024-10-03 PROCEDURE — G0145 SCR C/V CYTO,THINLAYER,RESCR: HCPCS | Performed by: OBSTETRICS & GYNECOLOGY

## 2024-10-03 PROCEDURE — G0476 HPV COMBO ASSAY CA SCREEN: HCPCS | Performed by: OBSTETRICS & GYNECOLOGY

## 2024-10-03 PROCEDURE — 87491 CHLMYD TRACH DNA AMP PROBE: CPT | Performed by: OBSTETRICS & GYNECOLOGY

## 2024-10-03 PROCEDURE — 99396 PREV VISIT EST AGE 40-64: CPT | Performed by: OBSTETRICS & GYNECOLOGY

## 2024-10-03 PROCEDURE — 87591 N.GONORRHOEAE DNA AMP PROB: CPT | Performed by: OBSTETRICS & GYNECOLOGY

## 2024-10-03 RX ORDER — IMIQUIMOD 12.5 MG/.25G
1 CREAM TOPICAL 3 TIMES WEEKLY
Qty: 24 EACH | Refills: 6 | Status: SHIPPED | OUTPATIENT
Start: 2024-10-04

## 2024-10-03 RX ORDER — METRONIDAZOLE 500 MG/1
500 TABLET ORAL EVERY 12 HOURS SCHEDULED
Qty: 14 TABLET | Refills: 0 | Status: SHIPPED | OUTPATIENT
Start: 2024-10-03 | End: 2024-10-10

## 2024-10-03 NOTE — PROGRESS NOTES
ANNUAL GYNECOLOGICAL EXAMINATION    Aleksey Johnson is a 38 y.o. female who presents today for annual GYN exam.  Her last pap smear was performed 21 and result was negative.  She reports  history of abnormal pap smears in her past.   She had HIV screening performed 24 and it was negative.  She reports menses as regular.  Patient's last menstrual period was 2024 (approximate).  Her general medical history has been reviewed and she reports it as follows:    Past Medical History:   Diagnosis Date    Abnormal Pap smear of cervix      colpo WNL;     Anxiety     no meds since 2018    Gonorrhea     Heart problem 2017    swelling of L side of heart - hospitalized x7 days @[place], unsure of specifics, no issues thereafter     Past Surgical History:   Procedure Laterality Date     SECTION      TUBAL LIGATION       OB History          5    Para   3    Term   3       0    AB   2    Living   3         SAB   0    IAB   2    Ectopic   0    Multiple   0    Live Births   3               Social History     Tobacco Use    Smoking status: Former     Current packs/day: 0.00     Types: Cigars, Cigarettes     Quit date:      Years since quittin.7     Passive exposure: Past    Smokeless tobacco: Never    Tobacco comments:     Uses hooka now   Vaping Use    Vaping status: Never Used   Substance Use Topics    Alcohol use: Yes     Comment: socially    Drug use: Not Currently     Types: Marijuana     Comment: daily marijuana use     Social History     Substance and Sexual Activity   Sexual Activity Yes    Partners: Male    Birth control/protection: Female Sterilization     Cancer-related family history includes Uterine cancer (age of onset: 59) in her mother. There is no history of Breast cancer, Colon cancer, or Ovarian cancer.    Current Outpatient Medications   Medication Instructions    acetaminophen (TYLENOL) 650 mg, Oral, Every 6 hours PRN    acetaminophen  "(TYLENOL) 650 mg, Oral, Every 8 hours PRN    DULoxetine (CYMBALTA) 30 mg, Oral, Daily    hydrOXYzine HCL (ATARAX) 25 mg, Oral, Every 6 hours PRN    imiquimod (ALDARA) 5 % cream 1 packet, Topical, 3 times weekly    medroxyPROGESTERone (PROVERA) 10 mg, Oral, Daily    methocarbamol (ROBAXIN) 1,500 mg, Oral, Daily at bedtime    methylPREDNISolone 4 MG tablet therapy pack 24mg PO on day 1, then decrease by 4mg/day x 5 days per dose pack instructions.    miSOPROStol (Cytotec) 200 mcg tablet Take one tablet orally the night prior to procedure then Take one tablet insert intravaginally the morning of procedure at 5:30am    miSOPROStol (Cytotec) 200 mcg tablet Take one tablet orally the night prior to procedure then Take one tablet insert intravaginally the morning of the procedure at 5:30am    naproxen (NAPROSYN) 500 mg, Oral, 2 times daily with meals    Restasis 0.05 % ophthalmic emulsion No dose, route, or frequency recorded.       Review of Systems:  Review of Systems   Genitourinary:  Negative for pelvic pain, vaginal bleeding and vaginal discharge.   All other systems reviewed and are negative.      Physical Exam:  /81 (BP Location: Left arm, Patient Position: Sitting, Cuff Size: Standard)   Pulse 88   Ht 5' 3\" (1.6 m)   Wt 87.1 kg (192 lb)   LMP 09/14/2024 (Approximate)   BMI 34.01 kg/m²   Physical Exam  Constitutional:       Appearance: Normal appearance.   Genitourinary:      Bladder and urethral meatus normal.      No lesions in the vagina.      Right Labia: No rash, tenderness or lesions.     Left Labia: No tenderness, lesions or rash.     No inguinal adenopathy present in the right or left side.     No vaginal discharge.        Right Adnexa: not tender, not full and no mass present.     Left Adnexa: not tender, not full and no mass present.     No cervical motion tenderness, discharge or lesion.      Uterus is not enlarged or tender.      No uterine mass detected.     No urethral tenderness or mass " present.   Breasts:     Breasts are soft.     Right: No swelling, inverted nipple, mass, nipple discharge, skin change or tenderness.      Left: No swelling, inverted nipple, mass, nipple discharge, skin change or tenderness.   HENT:      Head: Normocephalic and atraumatic.   Cardiovascular:      Rate and Rhythm: Normal rate and regular rhythm.   Pulmonary:      Effort: Pulmonary effort is normal.      Breath sounds: Normal breath sounds.   Abdominal:      General: Bowel sounds are normal.      Palpations: Abdomen is soft.      Hernia: There is no hernia in the left inguinal area or right inguinal area.   Musculoskeletal:         General: Normal range of motion.      Cervical back: Normal range of motion and neck supple.   Lymphadenopathy:      Upper Body:      Right upper body: No supraclavicular or axillary adenopathy.      Left upper body: No supraclavicular or axillary adenopathy.      Lower Body: No right inguinal adenopathy. No left inguinal adenopathy.   Neurological:      Mental Status: She is alert and oriented to person, place, and time.   Skin:     General: Skin is warm and dry.   Psychiatric:         Mood and Affect: Mood normal.   Vitals reviewed.       Discussion:  Discuss with patient EMB pathology results with benign findings.    Assessment/Plan:   1. Normal well-woman GYN exam.  2. Cervical cancer screening:  Normal cervical exam.  Pap smear done with HPV co-testing.  Has not received HPV vaccine in the past. Patient will think about getting the vaccine at her next visit.     3. STD screening:   Orders placed for vaginal GC/CT cultures.  Orders placed for serum anti-HIV, anti-HCV, HbsAg, syphilis panel.   4. Breast cancer screening:  Normal breast exam.  Order placed for bilateral screening mammogram.  Reviewed breast self-awareness.   6. Depression Screening: Patient's depression screening was assessed with a PHQ-2 score of 0.   7. BMI Counseling: Body mass index is 34.01 kg/m². Discussed the  patient's BMI with her. The BMI is above normal. Nutrition recommendations include decreasing overall calorie intake, moderation in carbohydrate intake, increasing intake of lean protein, reducing intake of saturated fat and trans fat, and reducing intake of cholesterol.   8. Contraception:  tubal ligation   9. Return to office 1yr/prn.   10. Bibiana/Flagyl escribed    Reviewed with patient that test results are available in [facility] immediately, but that they will not necessarily be reviewed by me immediately.  Explained that I will review results at my earliest opportunity and contact patient appropriately.

## 2024-10-04 LAB
HPV HR 12 DNA CVX QL NAA+PROBE: NEGATIVE
HPV16 DNA CVX QL NAA+PROBE: NEGATIVE
HPV18 DNA CVX QL NAA+PROBE: NEGATIVE

## 2024-10-07 LAB
C TRACH DNA SPEC QL NAA+PROBE: NEGATIVE
N GONORRHOEA DNA SPEC QL NAA+PROBE: NEGATIVE

## 2024-10-08 LAB
LAB AP GYN PRIMARY INTERPRETATION: NORMAL
Lab: NORMAL
PATH INTERP SPEC-IMP: NORMAL

## 2024-11-06 ENCOUNTER — OFFICE VISIT (OUTPATIENT)
Dept: OBGYN CLINIC | Facility: CLINIC | Age: 39
End: 2024-11-06

## 2024-11-06 VITALS
HEART RATE: 84 BPM | DIASTOLIC BLOOD PRESSURE: 77 MMHG | HEIGHT: 63 IN | SYSTOLIC BLOOD PRESSURE: 133 MMHG | BODY MASS INDEX: 18.85 KG/M2 | WEIGHT: 106.4 LBS

## 2024-11-06 DIAGNOSIS — Z01.818 PREOP EXAMINATION: Primary | ICD-10-CM

## 2024-11-06 DIAGNOSIS — N93.9 ABNORMAL UTERINE BLEEDING (AUB): ICD-10-CM

## 2024-11-06 DIAGNOSIS — D64.9 CHRONIC ANEMIA: ICD-10-CM

## 2024-11-06 DIAGNOSIS — L73.2 HIDRADENITIS: Primary | ICD-10-CM

## 2024-11-06 PROCEDURE — 99213 OFFICE O/P EST LOW 20 MIN: CPT | Performed by: OBSTETRICS & GYNECOLOGY

## 2024-11-06 RX ORDER — AMOXICILLIN AND CLAVULANATE POTASSIUM 250; 125 MG/1; MG/1
1 TABLET, FILM COATED ORAL EVERY 12 HOURS SCHEDULED
Qty: 20 TABLET | Refills: 0 | Status: SHIPPED | OUTPATIENT
Start: 2024-11-06 | End: 2024-11-16

## 2024-11-06 RX ORDER — METRONIDAZOLE 7.5 MG/G
1 GEL VAGINAL
Qty: 7 G | Refills: 0 | Status: SHIPPED | OUTPATIENT
Start: 2024-11-06 | End: 2024-11-13

## 2024-11-06 NOTE — PROGRESS NOTES
Assessment Aleksey was seen today for pre-op exam.    Diagnoses and all orders for this visit:    Preop examination    Chronic anemia    Abnormal uterine bleeding (AUB)         Plan    Aleksey Johnson is scheduled for  [facility]   on 11/15/24. Pre-op instructions, including showering with Hibiclens, discussed with patient and patient received paper copy.     The risks, benefits and alternatives to the procedure were discussed.  We discussed the risks of pain, bleeding, blood clot, infection, allergic reaction, neurovascular injury, injury to uterus, injury to surrounding structures such as bowel, bladder and/or ureters, and possibility of inability to complete the procedure.  We discussed code status - full code.  Blood transfusion is acceptable.  We discussed resident physician participation in the procedure, including pelvic exam.  All questions answered, consent obtained.        Subjective     Aleksey Johnson is a 39 y.o. female here for a pre-op consultation. She is without complaint today.       Patient Active Problem List   Diagnosis    Anxiety and depression    Other chest pain    Chronic anemia    Chronic low back pain    Swelling of joint of upper arm, right    Acute pain of right wrist    Iron deficiency anemia due to chronic blood loss         Gynecologic History  Patient's last menstrual period was 10/13/2024 (exact date).  Contraception: tubal ligation  Last Pap: 10/3/24. Results were: normal  1st mammogram: scheduled.     Obstetric History  OB History    Para Term  AB Living   5 3 3 0 2 3   SAB IAB Ectopic Multiple Live Births   0 2 0 0 3      # Outcome Date GA Lbr Edgardo/2nd Weight Sex Type Anes PTL Lv   5 IAB            4 IAB            3 Term            2 Term            1 Term                Past Medical/Surgical/Family/Social History    Past Medical History:   Diagnosis Date    Abnormal Pap smear of cervix      colpo WNL;     Anxiety     no meds since      Gonorrhea 2002    Heart problem 2017    swelling of L side of heart - hospitalized x7 days @[place], unsure of specifics, no issues thereafter     Past Surgical History:   Procedure Laterality Date     SECTION      TUBAL LIGATION  2008     Family History   Problem Relation Age of Onset    Hypertension Mother     Uterine cancer Mother 59    Hypertension Father     No Known Problems Sister     No Known Problems Sister     No Known Problems Daughter     No Known Problems Daughter     Throat cancer Maternal Uncle 45    No Known Problems Son     Breast cancer Neg Hx     Colon cancer Neg Hx     Ovarian cancer Neg Hx      Social History     Socioeconomic History    Marital status: Single     Spouse name: Not on file    Number of children: Not on file    Years of education: Not on file    Highest education level: Not on file   Occupational History    Not on file   Tobacco Use    Smoking status: Former     Current packs/day: 0.00     Types: Cigars, Cigarettes     Quit date:      Years since quittin.8     Passive exposure: Past    Smokeless tobacco: Never    Tobacco comments:     Uses hooka now   Vaping Use    Vaping status: Never Used   Substance and Sexual Activity    Alcohol use: Yes     Comment: rarely    Drug use: Yes     Types: Marijuana     Comment: daily marijuana use    Sexual activity: Yes     Partners: Male     Birth control/protection: Female Sterilization   Other Topics Concern    Not on file   Social History Narrative    Not on file     Social Determinants of Health     Financial Resource Strain: Low Risk  (2024)    Overall Financial Resource Strain (CARDIA)     Difficulty of Paying Living Expenses: Not hard at all   Food Insecurity: No Food Insecurity (2024)    Hunger Vital Sign     Worried About Running Out of Food in the Last Year: Never true     Ran Out of Food in the Last Year: Never true   Transportation Needs: No Transportation Needs (2024)    PRAPARE -  Transportation     Lack of Transportation (Medical): No     Lack of Transportation (Non-Medical): No   Physical Activity: Not on file   Stress: Stress Concern Present (6/3/2024)    Received from [facility]    [employer] [place] [employer] - Occupational Stress Questionnaire     Feeling of Stress : To some extent   Social Connections: Feeling Socially Integrated (6/3/2024)    Received from [facility]    OASIS : Social Isolation     How often do you feel lonely or isolated from those around you?: Never   Intimate Partner Violence: Not At Risk (6/3/2024)    Received from [facility]    Humiliation, Afraid, Rape, and Kick questionnaire     Fear of Current or Ex-Partner: No     Emotionally Abused: No     Physically Abused: No     Sexually Abused: No   Housing Stability: Low Risk  (10/3/2024)    Housing Stability Vital Sign     Unable to Pay for Housing in the Last Year: No     Number of Times Moved in the Last Year: 1     Homeless in the Last Year: No         Patient has no known allergies.    Current Outpatient Medications:     hydrOXYzine HCL (ATARAX) 25 mg tablet, Take 1 tablet (25 mg total) by mouth every 6 (six) hours as needed for anxiety, Disp: 30 tablet, Rfl: 1    imiquimod (ALDARA) 5 % cream, Apply 1 packet topically 3 (three) times a week, Disp: 24 each, Rfl: 6    naproxen (Naprosyn) 500 mg tablet, Take 1 tablet (500 mg total) by mouth 2 (two) times a day with meals for 14 days, Disp: 28 tablet, Rfl: 0    acetaminophen (TYLENOL) 650 mg CR tablet, Take 1 tablet (650 mg total) by mouth every 8 (eight) hours as needed for mild pain (Patient not taking: Reported on 11/6/2024), Disp: 30 tablet, Rfl: 0    DULoxetine (CYMBALTA) 30 mg delayed release capsule, Take 1 capsule (30 mg total) by mouth daily (Patient not taking: Reported on 10/3/2024), Disp: 30 capsule, Rfl: 2    medroxyPROGESTERone (PROVERA) 10 mg tablet, Take 1 tablet (10 mg total) by  "mouth daily (Patient not taking: Reported on 5/22/2024), Disp: 30 tablet, Rfl: 2    miSOPROStol (Cytotec) 200 mcg tablet, Take one tablet orally the night prior to procedure then Take one tablet insert intravaginally the morning of the procedure at 5:30am (Patient not taking: Reported on 11/6/2024), Disp: 2 tablet, Rfl: 0      Review of Systems  Constitutional: no fever, feels well  CV: No complaints of chest pain, palpitations  Pulm: No shortness of breath or dyspnea on exertion  Gastrointestinal: no complaints of abdominal pain, nausea  Genitourinary : no complaints of dysuria, vaginal discharge       Objective     /77 (BP Location: Left arm, Patient Position: Sitting, Cuff Size: Standard)   Pulse 84   Ht 5' 3\" (1.6 m)   Wt 48.3 kg (106 lb 6.4 oz)   LMP 10/13/2024 (Exact Date)   BMI 18.85 kg/m²     GEN: The patient was alert and oriented x3, pleasant well-appearing female in no acute distress.   CV: Regular rate and rhythm  PULM: nonlabored respirations, CTAB  ABD: BS positive, soft, nontender  : deferred  EXT: No calf tenderness  MSK: Normal gait  Skin: warm, dry  Neuro: no focal deficits  Psych: normal affect and judgement, cooperative   "

## 2024-11-07 ENCOUNTER — TELEPHONE (OUTPATIENT)
Dept: OBGYN CLINIC | Facility: CLINIC | Age: 39
End: 2024-11-07

## 2024-11-07 DIAGNOSIS — Z01.818 PREOP TESTING: Primary | ICD-10-CM

## 2024-11-11 ENCOUNTER — TELEPHONE (OUTPATIENT)
Dept: OBGYN CLINIC | Facility: CLINIC | Age: 39
End: 2024-11-11

## 2024-11-12 ENCOUNTER — APPOINTMENT (OUTPATIENT)
Dept: LAB | Facility: HOSPITAL | Age: 39
End: 2024-11-12

## 2024-11-12 DIAGNOSIS — Z01.818 PREOP TESTING: ICD-10-CM

## 2024-11-12 DIAGNOSIS — Z01.419 ROUTINE GYNECOLOGICAL EXAMINATION: Primary | ICD-10-CM

## 2024-11-12 DIAGNOSIS — R76.8 POSITIVE HEPATITIS C ANTIBODY TEST: ICD-10-CM

## 2024-11-13 ENCOUNTER — TELEPHONE (OUTPATIENT)
Dept: OBGYN CLINIC | Facility: CLINIC | Age: 39
End: 2024-11-13

## 2024-11-27 ENCOUNTER — TELEPHONE (OUTPATIENT)
Dept: OBGYN CLINIC | Facility: CLINIC | Age: 39
End: 2024-11-27

## 2024-12-17 ENCOUNTER — DOCUMENTATION (OUTPATIENT)
Dept: OBGYN CLINIC | Facility: CLINIC | Age: 39
End: 2024-12-17

## 2024-12-17 NOTE — PROGRESS NOTES
Left SMS number  for her to call me back  she needs a PRE OP ASAP  er  Dr Santamaria    her surgery  is  12/20   never  came back in for her  pre op     she will squeeze her in  this week  she will need labs  as well.

## 2025-01-28 ENCOUNTER — DOCUMENTATION (OUTPATIENT)
Dept: OBGYN CLINIC | Facility: CLINIC | Age: 40
End: 2025-01-28

## 2025-01-28 NOTE — PROGRESS NOTES
Pt did reschedule  her surgery   for  5/2   but have  not been able  to reach  her  to confrim  this    have  been texting her with messages   and advised  her to call office to set up  pre op exam  3 weeks  prior  to surgery day.

## 2025-04-22 DIAGNOSIS — N93.9 ABNORMAL UTERINE BLEEDING (AUB): ICD-10-CM

## 2025-04-22 DIAGNOSIS — D25.1 INTRAMURAL UTERINE FIBROID: ICD-10-CM

## 2025-04-22 DIAGNOSIS — Z01.812 ENCOUNTER FOR PRE-OPERATIVE LABORATORY TESTING: Primary | ICD-10-CM

## 2025-04-25 ENCOUNTER — APPOINTMENT (OUTPATIENT)
Dept: LAB | Facility: CLINIC | Age: 40
End: 2025-04-25
Attending: OBSTETRICS & GYNECOLOGY
Payer: MEDICARE

## 2025-04-25 ENCOUNTER — OFFICE VISIT (OUTPATIENT)
Dept: FAMILY MEDICINE CLINIC | Facility: CLINIC | Age: 40
End: 2025-04-25

## 2025-04-25 VITALS
OXYGEN SATURATION: 98 % | HEIGHT: 63 IN | RESPIRATION RATE: 16 BRPM | WEIGHT: 171 LBS | DIASTOLIC BLOOD PRESSURE: 62 MMHG | BODY MASS INDEX: 30.3 KG/M2 | SYSTOLIC BLOOD PRESSURE: 116 MMHG | HEART RATE: 77 BPM | TEMPERATURE: 97.4 F

## 2025-04-25 DIAGNOSIS — Z01.812 ENCOUNTER FOR PRE-OPERATIVE LABORATORY TESTING: ICD-10-CM

## 2025-04-25 DIAGNOSIS — R07.89 OTHER CHEST PAIN: ICD-10-CM

## 2025-04-25 DIAGNOSIS — N93.9 ABNORMAL UTERINE BLEEDING (AUB): ICD-10-CM

## 2025-04-25 DIAGNOSIS — R53.83 OTHER FATIGUE: ICD-10-CM

## 2025-04-25 DIAGNOSIS — D64.9 CHRONIC ANEMIA: ICD-10-CM

## 2025-04-25 DIAGNOSIS — R53.83 OTHER FATIGUE: Primary | ICD-10-CM

## 2025-04-25 DIAGNOSIS — D25.1 INTRAMURAL UTERINE FIBROID: ICD-10-CM

## 2025-04-25 LAB
25(OH)D3 SERPL-MCNC: 7.2 NG/ML (ref 30–100)
ABO GROUP BLD: NORMAL
ANION GAP SERPL CALCULATED.3IONS-SCNC: 9 MMOL/L (ref 4–13)
BASOPHILS # BLD AUTO: 0.03 THOUSANDS/ÂΜL (ref 0–0.1)
BASOPHILS NFR BLD AUTO: 1 % (ref 0–1)
BLD GP AB SCN SERPL QL: NEGATIVE
BUN SERPL-MCNC: 8 MG/DL (ref 5–25)
CALCIUM SERPL-MCNC: 9 MG/DL (ref 8.4–10.2)
CHLORIDE SERPL-SCNC: 106 MMOL/L (ref 96–108)
CO2 SERPL-SCNC: 25 MMOL/L (ref 21–32)
CREAT SERPL-MCNC: 0.6 MG/DL (ref 0.6–1.3)
EOSINOPHIL # BLD AUTO: 0.07 THOUSAND/ÂΜL (ref 0–0.61)
EOSINOPHIL NFR BLD AUTO: 2 % (ref 0–6)
ERYTHROCYTE [DISTWIDTH] IN BLOOD BY AUTOMATED COUNT: 20.3 % (ref 11.6–15.1)
GFR SERPL CREATININE-BSD FRML MDRD: 113 ML/MIN/1.73SQ M
GLUCOSE P FAST SERPL-MCNC: 95 MG/DL (ref 65–99)
HCT VFR BLD AUTO: 24 % (ref 34.8–46.1)
HGB BLD-MCNC: 6.2 G/DL (ref 11.5–15.4)
IMM GRANULOCYTES # BLD AUTO: 0.01 THOUSAND/UL (ref 0–0.2)
IMM GRANULOCYTES NFR BLD AUTO: 0 % (ref 0–2)
LYMPHOCYTES # BLD AUTO: 2.06 THOUSANDS/ÂΜL (ref 0.6–4.47)
LYMPHOCYTES NFR BLD AUTO: 53 % (ref 14–44)
MCH RBC QN AUTO: 17.2 PG (ref 26.8–34.3)
MCHC RBC AUTO-ENTMCNC: 26.3 G/DL (ref 31.4–37.4)
MCV RBC AUTO: 67 FL (ref 82–98)
MONOCYTES # BLD AUTO: 0.25 THOUSAND/ÂΜL (ref 0.17–1.22)
MONOCYTES NFR BLD AUTO: 7 % (ref 4–12)
NEUTROPHILS # BLD AUTO: 1.42 THOUSANDS/ÂΜL (ref 1.85–7.62)
NEUTS SEG NFR BLD AUTO: 37 % (ref 43–75)
NRBC BLD AUTO-RTO: 0 /100 WBCS
PLATELET # BLD AUTO: 293 THOUSANDS/UL (ref 149–390)
PMV BLD AUTO: 11.1 FL (ref 8.9–12.7)
POTASSIUM SERPL-SCNC: 3.5 MMOL/L (ref 3.5–5.3)
RBC # BLD AUTO: 3.6 MILLION/UL (ref 3.81–5.12)
RH BLD: POSITIVE
SODIUM SERPL-SCNC: 140 MMOL/L (ref 135–147)
SPECIMEN EXPIRATION DATE: NORMAL
TSH SERPL DL<=0.05 MIU/L-ACNC: 4.09 UIU/ML (ref 0.45–4.5)
VIT B12 SERPL-MCNC: 294 PG/ML (ref 180–914)
WBC # BLD AUTO: 3.84 THOUSAND/UL (ref 4.31–10.16)

## 2025-04-25 PROCEDURE — 84443 ASSAY THYROID STIM HORMONE: CPT

## 2025-04-25 PROCEDURE — 86901 BLOOD TYPING SEROLOGIC RH(D): CPT

## 2025-04-25 PROCEDURE — 82607 VITAMIN B-12: CPT

## 2025-04-25 PROCEDURE — 85025 COMPLETE CBC W/AUTO DIFF WBC: CPT

## 2025-04-25 PROCEDURE — 36415 COLL VENOUS BLD VENIPUNCTURE: CPT

## 2025-04-25 PROCEDURE — 82306 VITAMIN D 25 HYDROXY: CPT

## 2025-04-25 PROCEDURE — 86900 BLOOD TYPING SEROLOGIC ABO: CPT

## 2025-04-25 PROCEDURE — 93000 ELECTROCARDIOGRAM COMPLETE: CPT | Performed by: FAMILY MEDICINE

## 2025-04-25 PROCEDURE — 80048 BASIC METABOLIC PNL TOTAL CA: CPT

## 2025-04-25 PROCEDURE — 86850 RBC ANTIBODY SCREEN: CPT

## 2025-04-25 PROCEDURE — 99213 OFFICE O/P EST LOW 20 MIN: CPT | Performed by: FAMILY MEDICINE

## 2025-04-25 NOTE — PROGRESS NOTES
Name: Aleksey Johnson      : 1985      MRN: [id number]  Encounter Provider: Jeanne Reina MD  Encounter Date: 2025   Encounter department: [facility]  :  Assessment & Plan  Other fatigue  Lab Results   Component Value Date    WBC 6.36 2024    HGB 9.5 (L) 2024    HCT 33.6 (L) 2024    MCV 83 2024     (H) 2024     Fatigue is chronic, but is been feeling fatigued for the past 2.5 weeks   Patient has h/o anemia  Is scheduled for  HYSTERECTOMY LAPAROSCOPIC TOTAL (LTH) WITH  BILATERAL  SALPINGECTOMY  2025  Received venofer iron infusions 2023, 2024  C/o of some bilateral tingling in the legs that is on /off   Hba1c was 4.9%     Plan:   FU with previously ordered CBC and BMP     Orders:    TSH w/Reflex; Future    Echo complete w/ contrast if indicated; Future    Vitamin B12; Future    Vitamin D 25 hydroxy; Future    POCT ECG    Chronic anemia             Other chest pain  Reports right arm tingling on/off  Has been worked up for previously in  for a similar type of pain.   Echo and stress testing normal at that time.   POCT: normal sinus rythms, no evidence of ST elevations, non specific T wave inversions seen on previous ECG.   Denies concurrent GERD symptoms  C/p happens with no obvious trigger such as exertion or food, happens when she is just sitting down and at times she will feel dizzy with the chest pain.  Was previously referred to cardiology but did not FU       Plan:   FU in 2 weeks   ED/ return precautions discussed   Consider cardiology FU re-referral    Orders:    TSH w/Reflex; Future    Echo complete w/ contrast if indicated; Future    POCT ECG           History of Present Illness   HPI    Aleksey Johnson 39 y.o. with past medical history significant for anxiety and depression, anemia, AUB, uterine leiomyoma, comes to the clinic for fatigue. Is Patient is seen today for a same day visit.  Chronic conditions and all details of past medical history were not reviewed, and patient should follow up with PCP for comprehensive  review.   Fatigue began about 2.5 weeks ago. And notes some numbness and tingling in the lower body that comes and goes. She has a history of history of anemia, and is getting a surgery next month for hysterectomy. She does not feel well and fears that she may have to cancel the surgery. She does not currently take any iron supplements of denies and reports heavy bleeding. Sh has made dietary changes and eats more vegetables, eating less meat but still eats chicken and fish a few times a week. She also complains of chest pain that is not new, but that she is noticing every day and involved her left arm getting numb. Not related to exertion or food. Happens sometimes with resting and can be related to dizziness. Pain is sharp, but does go away on its on its own.     Review of Systems   Constitutional:  Positive for fatigue. Negative for chills and fever.   Eyes:  Negative for visual disturbance.   Respiratory:  Positive for cough. Negative for shortness of breath.    Cardiovascular:  Negative for chest pain and palpitations.   Gastrointestinal:  Negative for abdominal pain, diarrhea, nausea and vomiting.   Genitourinary:  Negative for difficulty urinating and hematuria.   Skin:  Negative for pallor.   Neurological:  Positive for dizziness and light-headedness.   Psychiatric/Behavioral:  Negative for confusion.        Objective   There were no vitals taken for this visit.     Physical Exam  Constitutional:       Appearance: Normal appearance. She is normal weight.   HENT:      Head: Normocephalic and atraumatic.      Right Ear: External ear normal.      Left Ear: External ear normal.      Nose: No congestion.      Mouth/Throat:      Mouth: Mucous membranes are moist.   Eyes:      Extraocular Movements: Extraocular movements intact.      Conjunctiva/sclera: Conjunctivae normal.    Cardiovascular:      Rate and Rhythm: Normal rate and regular rhythm.      Pulses: Normal pulses.      Heart sounds: Normal heart sounds.   Pulmonary:      Effort: Pulmonary effort is normal.      Breath sounds: Normal breath sounds.   Abdominal:      General: Bowel sounds are normal.      Palpations: Abdomen is soft.      Tenderness: There is no abdominal tenderness.   Skin:     General: Skin is warm.   Neurological:      General: No focal deficit present.      Mental Status: She is alert and oriented to person, place, and time.   Psychiatric:         Mood and Affect: Mood normal.

## 2025-04-26 NOTE — ASSESSMENT & PLAN NOTE
Reports right arm tingling on/off  Has been worked up for previously in 2020 for a similar type of pain.   Echo and stress testing normal at that time.   POCT: normal sinus rythms, no evidence of ST elevations, non specific T wave inversions seen on previous ECG.   Denies concurrent GERD symptoms  C/p happens with no obvious trigger such as exertion or food, happens when she is just sitting down and at times she will feel dizzy with the chest pain.  Was previously referred to cardiology but did not FU       Plan:   FU in 2 weeks   ED/ return precautions discussed   Consider cardiology FU re-referral    Orders:    TSH w/Reflex; Future    Echo complete w/ contrast if indicated; Future    POCT ECG

## 2025-04-28 ENCOUNTER — TELEPHONE (OUTPATIENT)
Dept: FAMILY MEDICINE CLINIC | Facility: CLINIC | Age: 40
End: 2025-04-28

## 2025-04-28 LAB — HCV RNA SERPL NAA+PROBE-ACNC: NOT DETECTED K[IU]/ML

## 2025-04-29 ENCOUNTER — TELEPHONE (OUTPATIENT)
Dept: OBGYN CLINIC | Facility: CLINIC | Age: 40
End: 2025-04-29

## 2025-04-29 ENCOUNTER — HOSPITAL ENCOUNTER (INPATIENT)
Facility: HOSPITAL | Age: 40
LOS: 1 days | Discharge: HOME | DRG: 663 | End: 2025-04-30
Attending: EMERGENCY MEDICINE | Admitting: FAMILY MEDICINE
Payer: MEDICARE

## 2025-04-29 ENCOUNTER — TELEPHONE (OUTPATIENT)
Dept: FAMILY MEDICINE CLINIC | Facility: CLINIC | Age: 40
End: 2025-04-29

## 2025-04-29 ENCOUNTER — APPOINTMENT (EMERGENCY)
Dept: RADIOLOGY | Facility: HOSPITAL | Age: 40
DRG: 663 | End: 2025-04-29
Payer: MEDICARE

## 2025-04-29 DIAGNOSIS — R06.00 DYSPNEA: ICD-10-CM

## 2025-04-29 DIAGNOSIS — R07.9 CHEST PAIN: ICD-10-CM

## 2025-04-29 DIAGNOSIS — N93.9 ABNORMAL UTERINE BLEEDING (AUB): ICD-10-CM

## 2025-04-29 DIAGNOSIS — D64.9 CHRONIC ANEMIA: ICD-10-CM

## 2025-04-29 DIAGNOSIS — D50.0 IRON DEFICIENCY ANEMIA DUE TO CHRONIC BLOOD LOSS: ICD-10-CM

## 2025-04-29 DIAGNOSIS — D25.1 INTRAMURAL UTERINE FIBROID: ICD-10-CM

## 2025-04-29 DIAGNOSIS — D64.9 SYMPTOMATIC ANEMIA: Primary | ICD-10-CM

## 2025-04-29 LAB
ABO GROUP BLD: NORMAL
ANION GAP SERPL CALCULATED.3IONS-SCNC: 4 MMOL/L (ref 4–13)
BASOPHILS # BLD AUTO: 0.02 THOUSANDS/ÂΜL (ref 0–0.1)
BASOPHILS NFR BLD AUTO: 1 % (ref 0–1)
BLD GP AB SCN SERPL QL: NEGATIVE
BUN SERPL-MCNC: 10 MG/DL (ref 5–25)
CALCIUM SERPL-MCNC: 8.6 MG/DL (ref 8.4–10.2)
CARDIAC TROPONIN I PNL SERPL HS: <2 NG/L (ref ?–50)
CHLORIDE SERPL-SCNC: 108 MMOL/L (ref 96–108)
CO2 SERPL-SCNC: 26 MMOL/L (ref 21–32)
CREAT SERPL-MCNC: 0.65 MG/DL (ref 0.6–1.3)
EOSINOPHIL # BLD AUTO: 0.09 THOUSAND/ÂΜL (ref 0–0.61)
EOSINOPHIL NFR BLD AUTO: 2 % (ref 0–6)
ERYTHROCYTE [DISTWIDTH] IN BLOOD BY AUTOMATED COUNT: 20 % (ref 11.6–15.1)
EXT PREGNANCY TEST URINE: NEGATIVE
EXT. CONTROL: NORMAL
FERRITIN SERPL-MCNC: 1 NG/ML (ref 30–307)
GFR SERPL CREATININE-BSD FRML MDRD: 110 ML/MIN/1.73SQ M
GLUCOSE SERPL-MCNC: 102 MG/DL (ref 65–140)
HCT VFR BLD AUTO: 22.6 % (ref 34.8–46.1)
HGB BLD-MCNC: 5.6 G/DL (ref 11.5–15.4)
IMM GRANULOCYTES # BLD AUTO: 0.01 THOUSAND/UL (ref 0–0.2)
IMM GRANULOCYTES NFR BLD AUTO: 0 % (ref 0–2)
IRON SATN MFR SERPL: 4 % (ref 15–50)
IRON SERPL-MCNC: 21 UG/DL (ref 50–212)
LYMPHOCYTES # BLD AUTO: 1.7 THOUSANDS/ÂΜL (ref 0.6–4.47)
LYMPHOCYTES NFR BLD AUTO: 42 % (ref 14–44)
MCH RBC QN AUTO: 17.3 PG (ref 26.8–34.3)
MCHC RBC AUTO-ENTMCNC: 24.7 G/DL (ref 31.4–37.4)
MCV RBC AUTO: 68 FL (ref 82–98)
MONOCYTES # BLD AUTO: 0.27 THOUSAND/ÂΜL (ref 0.17–1.22)
MONOCYTES NFR BLD AUTO: 7 % (ref 4–12)
NEUTROPHILS # BLD AUTO: 1.96 THOUSANDS/ÂΜL (ref 1.85–7.62)
NEUTS SEG NFR BLD AUTO: 48 % (ref 43–75)
NRBC BLD AUTO-RTO: 0 /100 WBCS
PLATELET # BLD AUTO: 215 THOUSANDS/UL (ref 149–390)
PMV BLD AUTO: 10.3 FL (ref 8.9–12.7)
POTASSIUM SERPL-SCNC: 3.5 MMOL/L (ref 3.5–5.3)
QRS AXIS: 25 DEGREES
QRSD INTERVAL: 86 MS
QT INTERVAL: 360 MS
QTC INTERVAL: 421 MS
RBC # BLD AUTO: 3.35 MILLION/UL (ref 3.81–5.12)
RH BLD: POSITIVE
SODIUM SERPL-SCNC: 138 MMOL/L (ref 135–147)
SPECIMEN EXPIRATION DATE: NORMAL
T WAVE AXIS: 45 DEGREES
TIBC SERPL-MCNC: 470.4 UG/DL (ref 250–450)
TRANSFERRIN SERPL-MCNC: 336 MG/DL (ref 203–362)
UIBC SERPL-MCNC: 449 UG/DL (ref 155–355)
VENTRICULAR RATE: 82 BPM
WBC # BLD AUTO: 4.05 THOUSAND/UL (ref 4.31–10.16)

## 2025-04-29 PROCEDURE — 86850 RBC ANTIBODY SCREEN: CPT

## 2025-04-29 PROCEDURE — 81025 URINE PREGNANCY TEST: CPT

## 2025-04-29 PROCEDURE — 86900 BLOOD TYPING SEROLOGIC ABO: CPT

## 2025-04-29 PROCEDURE — 82728 ASSAY OF FERRITIN: CPT

## 2025-04-29 PROCEDURE — 80048 BASIC METABOLIC PNL TOTAL CA: CPT

## 2025-04-29 PROCEDURE — 36430 TRANSFUSION BLD/BLD COMPNT: CPT

## 2025-04-29 PROCEDURE — 84484 ASSAY OF TROPONIN QUANT: CPT

## 2025-04-29 PROCEDURE — 85025 COMPLETE CBC W/AUTO DIFF WBC: CPT

## 2025-04-29 PROCEDURE — 81001 URINALYSIS AUTO W/SCOPE: CPT

## 2025-04-29 PROCEDURE — 83550 IRON BINDING TEST: CPT

## 2025-04-29 PROCEDURE — 99285 EMERGENCY DEPT VISIT HI MDM: CPT

## 2025-04-29 PROCEDURE — 93005 ELECTROCARDIOGRAM TRACING: CPT

## 2025-04-29 PROCEDURE — 30233N1 TRANSFUSION OF NONAUTOLOGOUS RED BLOOD CELLS INTO PERIPHERAL VEIN, PERCUTANEOUS APPROACH: ICD-10-PCS | Performed by: EMERGENCY MEDICINE

## 2025-04-29 PROCEDURE — 71046 X-RAY EXAM CHEST 2 VIEWS: CPT

## 2025-04-29 PROCEDURE — 36415 COLL VENOUS BLD VENIPUNCTURE: CPT

## 2025-04-29 PROCEDURE — 86923 COMPATIBILITY TEST ELECTRIC: CPT

## 2025-04-29 PROCEDURE — 93010 ELECTROCARDIOGRAM REPORT: CPT | Performed by: INTERNAL MEDICINE

## 2025-04-29 PROCEDURE — 86901 BLOOD TYPING SEROLOGIC RH(D): CPT

## 2025-04-29 PROCEDURE — P9016 RBC LEUKOCYTES REDUCED: HCPCS

## 2025-04-29 PROCEDURE — 99285 EMERGENCY DEPT VISIT HI MDM: CPT | Performed by: EMERGENCY MEDICINE

## 2025-04-29 PROCEDURE — 83540 ASSAY OF IRON: CPT

## 2025-04-29 RX ORDER — HYDROXYZINE HYDROCHLORIDE 25 MG/1
25 TABLET, FILM COATED ORAL EVERY 6 HOURS PRN
Status: DISCONTINUED | OUTPATIENT
Start: 2025-04-29 | End: 2025-04-30 | Stop reason: HOSPADM

## 2025-04-29 NOTE — TELEPHONE ENCOUNTER
Attempted to call patient regarding lab results. On review of labs she has very low Hg and is symptomatic based on our last visit. On review of notes , Her Gynecology doctor had called her earlier today regarding results and instructed her to go to the ED. Left  VM further instructing her to go to ED if she already hasn't, with callback number.

## 2025-04-29 NOTE — ED PROVIDER NOTES
"Time reflects when diagnosis was documented in both MDM as applicable and the Disposition within this note       Time User Action Codes Description Comment    4/29/2025 10:09 PM Ray Cooper [D64.9] Symptomatic anemia     4/29/2025 10:09 PM Ray Cooper Add [R07.9] Chest pain     4/29/2025 10:09 PM Ray Cooper Add [R06.00] Dyspnea           ED Disposition       ED Disposition   Admit    Condition   Stable    Date/Time   Tue Apr 29, 2025 10:09 PM    Comment   Case was discussed with Dr. Guillermo Aj and the patient's admission status was agreed to be Admission Status: inpatient status to the service of Dr. Laura Mancera .               Assessment & Plan       Medical Decision Making  Patient is a 39 y.o. female with PMH of fibroids who presents to the ED with chest pain shortness of breath.    Vital signs stable.  Physical exam as above.    History and physical exam most consistent with symptomatic anemia. However, differential diagnosis included but not limited to ACS, myocarditis, pericarditis, pneumonia, bronchitis, URI.     Plan: We will order CBC, BMP, troponin, EKG, chest x-ray.    View ED course above for further discussion on patient workup.     On review of previous records, patient was seen on 4/25/2025 by PCP.  Visit note reviewed.  Lab work ordered by PCP reviewed.    All labs reviewed and utilized in the medical decision making process  All radiology studies independently viewed by me and interpreted by the radiologist.  I reviewed all testing with the patient.     Upon re-evaluation, patient remained stable with normal vital signs.    Disposition: Patient admitted to  for symptomatic anemia and blood transfusion.    Portions of the record may have been created with voice recognition software. Occasional wrong word or \"sound a like\" substitutions may have occurred due to the inherent limitations of voice recognition software. Read the chart carefully and recognize, using context, where " substitutions have occurred.     Amount and/or Complexity of Data Reviewed  Labs: ordered. Decision-making details documented in ED Course.  Radiology: ordered. Decision-making details documented in ED Course.  ECG/medicine tests:  Decision-making details documented in ED Course.    Risk  Decision regarding hospitalization.        ED Course as of 04/29/25 2220 Tue Apr 29, 2025 2005 ECG 12 lead  Procedure Note: EKG  Date/Time: 04/29/25 8:05 PM   Interpreted by: ELIS DELUCA D.O.  Indications / Diagnosis: Chest pain  ECG reviewed by me, the ED Provider: yes   The EKG demonstrates: normal EKG, normal sinus rhythm, unchanged from previous tracings  Rhythm: normal sinus  Intervals: normal intervals  Axis: normal axis  QRS/Blocks: normal QRS  ST Changes: No acute ST Changes, no STD/ERUM.    2006 Blood Pressure: 107/59   2006 Pulse: 77   2006 SpO2: 99 %  RA   2056 Basic metabolic panel  Reassuring   2058 HS Troponin 0hr (reflex protocol)  Reassuring   2113 XR chest 2 views  No acute cardiopulmonary disease on my read   2125 Hemoglobin(!!): 5.6  Microcytic anemia, patient signed consent form and 2 units ordered       Medications - No data to display    ED Risk Strat Scores   HEART Risk Score      Flowsheet Row Most Recent Value   Heart Score Risk Calculator    History 0 Filed at: 04/29/2025 2209   ECG 0 Filed at: 04/29/2025 2209   Age 0 Filed at: 04/29/2025 2209   Risk Factors 1 Filed at: 04/29/2025 2209   Troponin 0 Filed at: 04/29/2025 2209   HEART Score 1 Filed at: 04/29/2025 2209          HEART Risk Score      Flowsheet Row Most Recent Value   Heart Score Risk Calculator    History 0 Filed at: 04/29/2025 2209   ECG 0 Filed at: 04/29/2025 2209   Age 0 Filed at: 04/29/2025 2209   Risk Factors 1 Filed at: 04/29/2025 2209   Troponin 0 Filed at: 04/29/2025 2209   HEART Score 1 Filed at: 04/29/2025 2209                      No data recorded                            History of Present Illness       Chief Complaint    Patient presents with    Chest Pain     Left sides chest pain that started about 2weeks ago. F/U with pcp two weeks ago, has labs done Friday, and was called by pcp today told to come in for abnormal labs and continued chest pain. SOB and dizziness with exertion.        Past Medical History:   Diagnosis Date    Abnormal Pap smear of cervix      colpo WNL;     Anxiety     no meds since 2018    Gonorrhea     Heart problem 2017    swelling of L side of heart - hospitalized x7 days [facility], unsure of specifics, no issues thereafter      Past Surgical History:   Procedure Laterality Date     SECTION      TUBAL LIGATION        Family History   Problem Relation Age of Onset    Hypertension Mother     Uterine cancer Mother 59    Hypertension Father     No Known Problems Sister     No Known Problems Sister     No Known Problems Daughter     No Known Problems Daughter     Throat cancer Maternal Uncle 45    No Known Problems Son     Breast cancer Neg Hx     Colon cancer Neg Hx     Ovarian cancer Neg Hx       Social History     Tobacco Use    Smoking status: Former     Current packs/day: 0.00     Types: Cigars, Cigarettes     Quit date: 2020     Years since quittin.3     Passive exposure: Past    Smokeless tobacco: Never    Tobacco comments:     Uses hooka now   Vaping Use    Vaping status: Never Used   Substance Use Topics    Alcohol use: Yes     Comment: rarely    Drug use: Yes     Types: Marijuana     Comment: daily marijuana use      E-Cigarette/Vaping    E-Cigarette Use Never User       E-Cigarette/Vaping Substances    Nicotine No     THC No     CBD No     Flavoring No     Other No     Unknown No       I have reviewed and agree with the history as documented.     Patient is a 39-year-old female with a past medical history of fibroids who presents today with chest pain shortness of breath.  Patient states that over the past couple of weeks she has been feeling short of breath and dizzy.   Patient states that she saw her PCP on 4/25/2025 who ordered lab work.  She states that she was called by her PCP who noted her to be anemic with hemoglobin at 6.2 and instructed her to present to the emergency department.  Patient states that about 2 weeks ago she had heavy bleeding at the start of her menses and around that time started to feel dizzy.  Patient denies any fevers, chills, abdominal pain, nausea, vomiting, diarrhea, constipation, headache, vision changes.        Review of Systems   Constitutional:  Negative for chills and fever.   HENT:  Negative for ear pain and sore throat.    Eyes:  Negative for pain and visual disturbance.   Respiratory:  Positive for shortness of breath. Negative for cough.    Cardiovascular:  Positive for chest pain. Negative for palpitations.   Gastrointestinal:  Negative for abdominal pain, constipation, diarrhea, nausea and vomiting.   Genitourinary:  Negative for dysuria and hematuria.   Musculoskeletal:  Negative for arthralgias and back pain.   Skin:  Negative for color change and rash.   Neurological:  Negative for seizures and syncope.   All other systems reviewed and are negative.          Objective       ED Triage Vitals [04/29/25 1922]   Temperature Pulse Blood Pressure Respirations SpO2 Patient Position - Orthostatic VS   97.8 °F (36.6 °C) 83 120/66 20 100 % Sitting      Temp Source Heart Rate Source BP Location FiO2 (%) Pain Score    Temporal Monitor Right arm -- --      Vitals      Date and Time Temp Pulse SpO2 Resp BP Pain Score FACES Pain Rating User   04/29/25 2210 98.3 °F (36.8 °C) 78 -- 16 105/56 -- -- MS   04/29/25 1945 -- 77 99 % 21 107/59 -- -- MS   04/29/25 1922 97.8 °F (36.6 °C) 83 100 % 20 120/66 -- -- IRIS            Physical Exam  Vitals and nursing note reviewed.   Constitutional:       General: She is not in acute distress.     Appearance: She is well-developed. She is not ill-appearing.   HENT:      Head: Normocephalic and atraumatic.      Nose: Nose  normal.      Mouth/Throat:      Mouth: Mucous membranes are moist.      Pharynx: Oropharynx is clear.   Eyes:      Extraocular Movements: Extraocular movements intact.      Conjunctiva/sclera: Conjunctivae normal.      Pupils: Pupils are equal, round, and reactive to light.   Cardiovascular:      Rate and Rhythm: Normal rate and regular rhythm.      Pulses: Normal pulses.      Heart sounds: Normal heart sounds. No murmur heard.  Pulmonary:      Effort: Pulmonary effort is normal. No respiratory distress.      Breath sounds: Normal breath sounds. No stridor. No wheezing, rhonchi or rales.   Abdominal:      General: Abdomen is flat. Bowel sounds are normal. There is no distension.      Palpations: Abdomen is soft. There is no mass.      Tenderness: There is no abdominal tenderness. There is no guarding or rebound.   Musculoskeletal:         General: No swelling. Normal range of motion.      Cervical back: Normal range of motion and neck supple.   Skin:     General: Skin is warm and dry.      Capillary Refill: Capillary refill takes less than 2 seconds.   Neurological:      General: No focal deficit present.      Mental Status: She is alert and oriented to person, place, and time.   Psychiatric:         Mood and Affect: Mood normal.         Results Reviewed       Procedure Component Value Units Date/Time    TIBC Panel (incl. Iron, TIBC, % Iron Saturation) [[id number]] Collected: 04/29/25 2016    Lab Status: In process Specimen: Blood from Arm, Right Updated: 04/29/25 2154    Ferritin [[id number]] Collected: 04/29/25 2016    Lab Status: In process Specimen: Blood from Arm, Right Updated: 04/29/25 2154    CBC and differential [[id number]]  (Abnormal) Collected: 04/29/25 2016    Lab Status: Final result Specimen: Blood from Arm, Right Updated: 04/29/25 2126     WBC 4.05 Thousand/uL      RBC 3.35 Million/uL      Hemoglobin 5.6 g/dL      Hematocrit 22.6 %      MCV 68 fL      MCH 17.3 pg      MCHC 24.7 g/dL      RDW 20.0 %       MPV 10.3 fL      Platelets 215 Thousands/uL      nRBC 0 /100 WBCs      Segmented % 48 %      Immature Grans % 0 %      Lymphocytes % 42 %      Monocytes % 7 %      Eosinophils Relative 2 %      Basophils Relative 1 %      Absolute Neutrophils 1.96 Thousands/µL      Absolute Immature Grans 0.01 Thousand/uL      Absolute Lymphocytes 1.70 Thousands/µL      Absolute Monocytes 0.27 Thousand/µL      Eosinophils Absolute 0.09 Thousand/µL      Basophils Absolute 0.02 Thousands/µL     Narrative:      This is an appended report.  These results have been appended to a previously verified report.    POCT pregnancy, urine [[id number]]  (Normal) Collected: 04/29/25 2114    Lab Status: Final result Updated: 04/29/25 2114     EXT Preg Test, Ur Negative     Control Valid    HS Troponin 0hr (reflex protocol) [[id number]]  (Normal) Collected: 04/29/25 2016    Lab Status: Final result Specimen: Blood from Arm, Right Updated: 04/29/25 2057     hs TnI 0hr <2 ng/L     Basic metabolic panel [[id number]] Collected: 04/29/25 2016    Lab Status: Final result Specimen: Blood from Arm, Right Updated: 04/29/25 2048     Sodium 138 mmol/L      Potassium 3.5 mmol/L      Chloride 108 mmol/L      CO2 26 mmol/L      ANION GAP 4 mmol/L      BUN 10 mg/dL      Creatinine 0.65 mg/dL      Glucose 102 mg/dL      Calcium 8.6 mg/dL      eGFR 110 ml/min/1.73sq m     Narrative:      National Kid[employer] Disease Foundation guidelines for Chronic Kidney Disease (CKD):     Stage 1 with normal or high GFR (GFR > 90 mL/min/1.73 square meters)    Stage 2 Mild CKD (GFR = 60-89 mL/min/1.73 square meters)    Stage 3A Moderate CKD (GFR = 45-59 mL/min/1.73 square meters)    Stage 3B Moderate CKD (GFR = 30-44 mL/min/1.73 square meters)    Stage 4 Severe CKD (GFR = 15-29 mL/min/1.73 square meters)    Stage 5 End Stage CKD (GFR <15 mL/min/1.73 square meters)  Note: GFR calculation is accurate only with a steady state creatinine            XR chest 2 views    (Results Pending)        Procedures    ED Medication and Procedure Management   Prior to Admission Medications   Prescriptions Last Dose Informant Patient Reported? Taking?   DULoxetine (CYMBALTA) 30 mg delayed release capsule   No No   Sig: Take 1 capsule (30 mg total) by mouth daily   Patient not taking: Reported on 10/3/2024   acetaminophen (TYLENOL) 650 mg CR tablet   No No   Sig: Take 1 tablet (650 mg total) by mouth every 8 (eight) hours as needed for mild pain   Patient not taking: Reported on 11/6/2024   hydrOXYzine HCL (ATARAX) 25 mg tablet   No No   Sig: Take 1 tablet (25 mg total) by mouth every 6 (six) hours as needed for anxiety   imiquimod (ALDARA) 5 % cream   No No   Sig: Apply 1 packet topically 3 (three) times a week   medroxyPROGESTERone (PROVERA) 10 mg tablet   No No   Sig: Take 1 tablet (10 mg total) by mouth daily   Patient not taking: Reported on 5/22/2024   miSOPROStol (Cytotec) 200 mcg tablet   No No   Sig: Take one tablet orally the night prior to procedure then Take one tablet insert intravaginally the morning of the procedure at 5:30am   Patient not taking: Reported on 11/6/2024   naproxen (Naprosyn) 500 mg tablet   No No   Sig: Take 1 tablet (500 mg total) by mouth 2 (two) times a day with meals for 14 days      Facility-Administered Medications: None     Patient's Medications   Discharge Prescriptions    No medications on file     No discharge procedures on file.  ED SEPSIS DOCUMENTATION   Time reflects when diagnosis was documented in both MDM as applicable and the Disposition within this note       Time User Action Codes Description Comment    4/29/2025 10:09 PM Ray Cooper [D64.9] Symptomatic anemia     4/29/2025 10:09 PM Ray Cooper [R07.9] Chest pain     4/29/2025 10:09 PM Ray Cooper [R06.00] Dyspnea                  Ray Cooper DO  04/29/25 2229

## 2025-04-29 NOTE — TELEPHONE ENCOUNTER
Called patient to inform her that surgery is cancelled due to her anemic state.  Discuss with patient if she is having any symptoms patient stated did not go to work today due dizziness and chest pain.  Recommend that patient go to the nearest emergency room for evaluation of her symptoms which is due to the her anemic state.  Discuss with patient to call for follow up appt after her ER visit.

## 2025-04-30 VITALS
HEART RATE: 70 BPM | HEIGHT: 63 IN | DIASTOLIC BLOOD PRESSURE: 62 MMHG | BODY MASS INDEX: 32.03 KG/M2 | OXYGEN SATURATION: 100 % | WEIGHT: 180.78 LBS | RESPIRATION RATE: 16 BRPM | SYSTOLIC BLOOD PRESSURE: 111 MMHG | TEMPERATURE: 98.4 F

## 2025-04-30 LAB
ABO GROUP BLD BPU: NORMAL
ABO GROUP BLD BPU: NORMAL
ANION GAP SERPL CALCULATED.3IONS-SCNC: 4 MMOL/L (ref 4–13)
BACTERIA UR QL AUTO: ABNORMAL /HPF
BILIRUB UR QL STRIP: NEGATIVE
BPU ID: NORMAL
BPU ID: NORMAL
BUN SERPL-MCNC: 8 MG/DL (ref 5–25)
CALCIUM SERPL-MCNC: 8.4 MG/DL (ref 8.4–10.2)
CHLORIDE SERPL-SCNC: 110 MMOL/L (ref 96–108)
CLARITY UR: CLEAR
CO2 SERPL-SCNC: 24 MMOL/L (ref 21–32)
COLOR UR: ABNORMAL
CREAT SERPL-MCNC: 0.62 MG/DL (ref 0.6–1.3)
CROSSMATCH: NORMAL
CROSSMATCH: NORMAL
ERYTHROCYTE [DISTWIDTH] IN BLOOD BY AUTOMATED COUNT: 20.4 % (ref 11.6–15.1)
ERYTHROCYTE [DISTWIDTH] IN BLOOD BY AUTOMATED COUNT: 20.5 % (ref 11.6–15.1)
GFR SERPL CREATININE-BSD FRML MDRD: 112 ML/MIN/1.73SQ M
GLUCOSE SERPL-MCNC: 96 MG/DL (ref 65–140)
GLUCOSE UR STRIP-MCNC: NEGATIVE MG/DL
HCT VFR BLD AUTO: 30.6 % (ref 34.8–46.1)
HCT VFR BLD AUTO: 30.9 % (ref 34.8–46.1)
HGB BLD-MCNC: 8.7 G/DL (ref 11.5–15.4)
HGB BLD-MCNC: 8.9 G/DL (ref 11.5–15.4)
HGB UR QL STRIP.AUTO: NEGATIVE
KETONES UR STRIP-MCNC: NEGATIVE MG/DL
LEUKOCYTE ESTERASE UR QL STRIP: NEGATIVE
MCH RBC QN AUTO: 20.1 PG (ref 26.8–34.3)
MCH RBC QN AUTO: 20.4 PG (ref 26.8–34.3)
MCHC RBC AUTO-ENTMCNC: 28.2 G/DL (ref 31.4–37.4)
MCHC RBC AUTO-ENTMCNC: 29.1 G/DL (ref 31.4–37.4)
MCV RBC AUTO: 70 FL (ref 82–98)
MCV RBC AUTO: 71 FL (ref 82–98)
MUCOUS THREADS UR QL AUTO: ABNORMAL
NITRITE UR QL STRIP: NEGATIVE
NON-SQ EPI CELLS URNS QL MICRO: ABNORMAL /HPF
PH UR STRIP.AUTO: 6.5 [PH]
PLATELET # BLD AUTO: 206 THOUSANDS/UL (ref 149–390)
PLATELET # BLD AUTO: 227 THOUSANDS/UL (ref 149–390)
PMV BLD AUTO: 10.1 FL (ref 8.9–12.7)
PMV BLD AUTO: 10.8 FL (ref 8.9–12.7)
POTASSIUM SERPL-SCNC: 3.5 MMOL/L (ref 3.5–5.3)
PROT UR STRIP-MCNC: NEGATIVE MG/DL
RBC # BLD AUTO: 4.33 MILLION/UL (ref 3.81–5.12)
RBC # BLD AUTO: 4.37 MILLION/UL (ref 3.81–5.12)
RBC #/AREA URNS AUTO: ABNORMAL /HPF
SODIUM SERPL-SCNC: 138 MMOL/L (ref 135–147)
SP GR UR STRIP.AUTO: 1.02 (ref 1–1.03)
UNIT DISPENSE STATUS: NORMAL
UNIT DISPENSE STATUS: NORMAL
UNIT PRODUCT CODE: NORMAL
UNIT PRODUCT CODE: NORMAL
UNIT PRODUCT VOLUME: 350 ML
UNIT PRODUCT VOLUME: 350 ML
UNIT RH: NORMAL
UNIT RH: NORMAL
UROBILINOGEN UR STRIP-ACNC: <2 MG/DL
WBC # BLD AUTO: 5.71 THOUSAND/UL (ref 4.31–10.16)
WBC # BLD AUTO: 5.88 THOUSAND/UL (ref 4.31–10.16)
WBC #/AREA URNS AUTO: ABNORMAL /HPF

## 2025-04-30 PROCEDURE — NC001 PR NO CHARGE: Performed by: FAMILY MEDICINE

## 2025-04-30 PROCEDURE — 85027 COMPLETE CBC AUTOMATED: CPT | Performed by: FAMILY MEDICINE

## 2025-04-30 PROCEDURE — P9016 RBC LEUKOCYTES REDUCED: HCPCS

## 2025-04-30 PROCEDURE — 80048 BASIC METABOLIC PNL TOTAL CA: CPT

## 2025-04-30 PROCEDURE — 85027 COMPLETE CBC AUTOMATED: CPT

## 2025-04-30 PROCEDURE — 99236 HOSP IP/OBS SAME DATE HI 85: CPT | Performed by: FAMILY MEDICINE

## 2025-04-30 RX ORDER — ERGOCALCIFEROL 1.25 MG/1
50000 CAPSULE, LIQUID FILLED ORAL WEEKLY
Qty: 12 CAPSULE | Refills: 0 | Status: SHIPPED | OUTPATIENT
Start: 2025-04-30 | End: 2025-07-17

## 2025-04-30 RX ORDER — SODIUM CHLORIDE 9 MG/ML
20 INJECTION, SOLUTION INTRAVENOUS ONCE
OUTPATIENT
Start: 2025-05-05

## 2025-04-30 RX ORDER — MEDROXYPROGESTERONE ACETATE 10 MG
10 TABLET ORAL DAILY
Qty: 90 TABLET | Refills: 0 | Status: SHIPPED | OUTPATIENT
Start: 2025-04-30

## 2025-04-30 RX ORDER — FERROUS FUMARATE 324(106)MG
324 TABLET ORAL DAILY
Qty: 90 TABLET | Refills: 0 | Status: SHIPPED | OUTPATIENT
Start: 2025-04-30

## 2025-04-30 RX ORDER — FERROUS FUMARATE 324(106)MG
324 TABLET ORAL
Qty: 90 TABLET | Refills: 0 | Status: SHIPPED | OUTPATIENT
Start: 2025-04-30 | End: 2025-04-30

## 2025-04-30 RX ADMIN — IRON SUCROSE 200 MG: 20 INJECTION, SOLUTION INTRAVENOUS at 08:58

## 2025-04-30 RX ADMIN — HYDROXYZINE HYDROCHLORIDE 25 MG: 25 TABLET, FILM COATED ORAL at 00:07

## 2025-04-30 NOTE — ASSESSMENT & PLAN NOTE
Has a history of chronic anemia 2/2 chronic blood loss from AUB  For the past 2 to 3 weeks, patient has had worsening fatigue  She reports that her periods last about 7 days, most recently from around April 11 to 18  She has a history of receiving Venofer infusions, in 2023 and 2024  She previously had tried oral iron though it made her constipated and she could not tolerate it  She also previously was on Provera, and noticed some improvement to her AUB  Recent blood work from 4/25 showed hemoglobin 6.2 --patient was encouraged to go to the ED upon reviewing labs by PCP and GYN today  Originally scheduled for total hysterectomy with bilateral salpingectomy on May 2, however this has been postponed given her significant anemia  She also reported several day history of chest pain; she has a history of this that also radiates down her arm; negative cardiac workup in the past  Nuys any urinary bleeding or known GI bleeding; no other source of bleeding noted  In the ED, repeat hemoglobin was 5.6, hematocrit 22.6  Platelets 215 down from 293 last week  WBC 4.5 up from 3.84 last week  Iron panel notable for iron 21, ferritin 1  EKG performed in the ED showed NSR; troponins negative  Patient was given 2 units PRBCs in the ED  Overall benign examination in the ED; did have adequate cap refill but pallor of the palpebral conjunctiva    Impression: Symptomatic iron deficiency anemia in the setting of chronic blood loss from AUB    Hgb / Hct       Results from last 7 days   Lab Units 04/29/25 2016 04/25/25  1425   HEMOGLOBIN g/dL 5.6* 6.2*   HEMATOCRIT % 22.6* 24.0*       Iron Panel        Lab Results   Component Value Date    CONCFE 4 (L) 04/29/2025    CONCFE  12/19/2023      Comment:      Unable to Calculate.    TIBC 470.4 (H) 04/29/2025    TIBC <326 12/19/2023    IRON 21 (L) 04/29/2025    IRON <10 (L) 12/19/2023    FERRITIN 1 (L) 04/29/2025    FERRITIN 4 (L) 12/19/2023          Plan:  -Complete 2 units PRBCs  -Recheck CBC  after transfusion  -AM CBC, BMP  -IV iron ordered to start 4/30  -UA with micro pending  -Transfuse hgb < 7  -Will need follow up outpatient with GYN, PCP  -Likely will need outpatient Venofer infusions vs compliance to oral iron

## 2025-04-30 NOTE — ASSESSMENT & PLAN NOTE
H/o anxiety and depression  Previously on Cymbalta 30mg daily, but has been off of this.  Now she takes Atarax 25mg nightly PRN     Plan:  -Continue home atarax PRN qhs

## 2025-04-30 NOTE — UTILIZATION REVIEW
NOTIFICATION OF ADMISSION DISCHARGE   This is a Notification of Discharge from [facility]. Please be advised that this patient has been discharge from our facility. Below you will find the admission and discharge date and time including the patient’s disposition.   UTILIZATION REVIEW CONTACT:  Utilization Review Assistants  Network Utilization Review Department  Phone: [phone] x carefully listen to the prompts. All voicemails are confidential.  Email: [email or web address]     ADMISSION INFORMATION  PRESENTATION DATE: 4/29/2025  7:26 PM  OBERVATION ADMISSION DATE: N/A  INPATIENT ADMISSION DATE: 4/29/25 10:10 PM   DISCHARGE DATE: 4/30/2025  2:45 PM   DISPOSITION:Home/Self Care    Network Utilization Review Department  ATTENTION: Please call with any questions or concerns to [phone] and carefully listen to the prompts so that you are directed to the right person. All voicemails are confidential.   For Discharge needs, contact Care Management DC Support Team at [phone] opt. 2  Send all requests for admission clinical reviews, approved or denied determinations and any other requests to dedicated fax number below belonging to the campus where the patient is receiving treatment. List of dedicated fax numbers for the Facilities:  FACILITY NAME UR FAX NUMBER   ADMISSION DENIALS (Administrative/Medical Necessity) [phone]   DISCHARGE SUPPORT TEAM ([facility]) [phone]   PARENT CHILD HEALTH (Maternity/NICU/Pediatrics) [phone]   [facility] [phone]   [facility] [phone]   [facility] [phone]   [facility] [phone]   [facility] [phone]   [facility] [phone]   [facility] [phone]   [facility] [phone]   [facility]  [facility] [phone]   [facility] [phone]   [facility] [phone]   [facility] [phone]

## 2025-04-30 NOTE — DISCHARGE INSTR - AVS FIRST PAGE
Start Taking:  - Vitamin D 50,000 units once a week x 12 weeks  - Ferrous Fumarate  daily with Vitamin C   - Start Provera 10mg daily for regulating bleeding with menstrual cycles  - Increase intake of dark leafy greens    You will need to complete two Venofer infusions at [facility] next week     Follow Up:  Please follow up with your PCP within 1 week  Please follow up with your OBGYN in 1 week  Please follow up referral to GI as soon as possible     Please complete repeat lab work with a CBC in one month

## 2025-04-30 NOTE — UTILIZATION REVIEW
Initial Clinical Review    Admission: Date/Time/Statement:   Admission Orders (From admission, onward)       Ordered        04/29/25 2210  INPATIENT ADMISSION  Once                          Orders Placed This Encounter   Procedures    INPATIENT ADMISSION     Standing Status:   Standing     Number of Occurrences:   1     Level of Care:   Med Surg [16]     Estimated length of stay:   More than 2 Midnights     Certification:   I certify that inpatient services are medically necessary for this patient for a duration of greater than two midnights. See H&P and MD Progress Notes for additional information about the patient's course of treatment.     ED Arrival Information       Expected   -    Arrival   4/29/2025 19:18    Acuity   Urgent              Means of arrival   Walk-In    Escorted by   Self    Service   Family Medicine    Admission type   Emergency              Arrival complaint   Chest pain             Chief Complaint   Patient presents with    Chest Pain     Left sides chest pain that started about 2weeks ago. F/U with pcp two weeks ago, has labs done Friday, and was called by pcp today told to come in for abnormal labs and continued chest pain. SOB and dizziness with exertion.        Initial Presentation: 39 y.o. female with PMHx including  chronic blood loss iron deficiency anemia  who presented on 4/29/25 to ED due to fatigue and chest pain for 2-3 weeks. OP labs ordered by PCP, hgb 6.2. In the ED,  hemoglobin was 5.6.  She was given 2 units PRBCs by ED physician.  EKG, troponins, BMP, CXR unremarkable.  Iron panel significant for iron of 21, ferritin 1.     Plan:  Admit Inpatient status Dx Iron deficiency anemia due to chronic blood loss :   med surg, recheck CBC post transfusion, order CBC and CMP for am, IV iron to start on 4/30. Transfuse hgb less than 7. FU on UA. Will need follow up outpatient with GYN, PCP. Likely will need outpatient Venofer infusions vs compliance to oral iron. Order  echo.    Anticipated Length of Stay/Certification Statement: Patient will be admitted on an inpatient basis with an anticipated length of stay of greater than 2 midnights secondary to severe symptomatic anemia, with need for transfusions and IV iron.     Date: 4/30   Day 2:  Per Discharge Note: On 04/30/25, HD# 1, pt remains stable and is medically cleared for discharge home  with instruction for medication and compliance and further Venofer treatments next week. Patient will need close follow-up of her iron, vitamin D levels and may require further Venofer infusions.  A repeat CBC has been ordered to be completed in one month.  Patient will need to make close follow-up appointment with PCP and other providers listed on AVS including GI. Patient to continue further discussions with her OBGYN about now canceled hysterectomy that was scheduled for 5/2/25.     Scheduled Medications:  iron sucrose, 200 mg, Intravenous, Daily      Continuous IV Infusions: none     PRN Meds:  hydrOXYzine HCL, 25 mg, Oral, Q6H PRN      ED Triage Vitals   Temperature Pulse Respirations Blood Pressure SpO2 Pain Score   04/29/25 1922 04/29/25 1922 04/29/25 1922 04/29/25 1922 04/29/25 1922 04/29/25 2351   97.8 °F (36.6 °C) 83 20 120/66 100 % No Pain     Weight (last 2 days)       Date/Time Weight    04/30/25 0032 82 (180.78)            Vital Signs (last 3 days)       Date/Time Temp Pulse Resp BP MAP (mmHg) SpO2 O2 Device Patient Position - Orthostatic VS Pain    04/30/25 07:31:21 98.4 °F (36.9 °C) 70 16 111/62 78 100 % -- -- --    04/30/25 03:29:10 98.9 °F (37.2 °C) 67 16 111/63 79 100 % -- -- --    04/30/25 0042 98.4 °F (36.9 °C) 66 16 119/69 -- 100 % None (Room air) -- --    04/30/25 00:41:24 -- 64 -- 119/69 86 100 % -- -- --    04/30/25 0026 98.3 °F (36.8 °C) -- 16 -- -- -- -- -- --    04/30/25 00:24:13 -- 67 -- 109/55 73 99 % -- -- --    04/29/25 23:53:02 -- 72 -- 108/53 71 100 % -- -- --    04/29/25 2351 -- -- -- -- -- -- -- -- No Pain     04/29/25 2331 98.9 °F (37.2 °C) 68 18 108/63 -- 99 % None (Room air) -- --    04/29/25 2315 -- 68 18 108/61 80 100 % None (Room air) Sitting --    04/29/25 2241 -- 74 16 106/56 -- -- -- -- --    04/29/25 2235 98.1 °F (36.7 °C) 73 16 102/56 -- 98 % None (Room air) -- --    04/29/25 2222 -- 75 16 100/61 -- 98 % None (Room air) -- --    04/29/25 2215 97.9 °F (36.6 °C) 84 16 102/61 -- 99 % None (Room air) -- --    04/29/25 2210 98.3 °F (36.8 °C) 78 16 105/56 -- -- -- -- --    04/29/25 1945 -- 77 21 107/59 80 99 % None (Room air) Sitting --    04/29/25 1922 97.8 °F (36.6 °C) 83 20 120/66 85 100 % None (Room air) Sitting --              Pertinent Labs/Diagnostic Test Results:   Radiology:  XR chest 2 views   Final Interpretation by Lincoln Scott MD (04/30 0521)      No acute cardiopulmonary disease.            Workstation performed: [id number]           Cardiology:  ECG 12 lead   Final Result by Ludy Haas MD (04/29 2313)   Normal sinus rhythm   Low voltage QRS   Borderline ECG   When compared with ECG of 03-Feb-2024 20:26,   No significant change was found   Confirmed by Ludy Haas ([id number]) on 4/29/2025 11:13:19 PM        GI:  No orders to display           Results from last 7 days   Lab Units 04/30/25  0612 04/29/25 2016 04/25/25  1425   WBC Thousand/uL 5.88  5.71 4.05* 3.84*   HEMOGLOBIN g/dL 8.9*  8.7* 5.6* 6.2*   HEMATOCRIT % 30.6*  30.9* 22.6* 24.0*   PLATELETS Thousands/uL 206  227 215 293   TOTAL NEUT ABS Thousands/µL  --  1.96 1.42*         Results from last 7 days   Lab Units 04/30/25  0612 04/29/25 2016 04/25/25  1425   SODIUM mmol/L 138 138 140   POTASSIUM mmol/L 3.5 3.5 3.5   CHLORIDE mmol/L 110* 108 106   CO2 mmol/L 24 26 25   ANION GAP mmol/L 4 4 9   BUN mg/dL 8 10 8   CREATININE mg/dL 0.62 0.65 0.60   EGFR ml/min/1.73sq m 112 110 113   CALCIUM mg/dL 8.4 8.6 9.0             Results from last 7 days   Lab Units 04/30/25  0612 04/29/25 2016   GLUCOSE RANDOM mg/dL 96 102       Results from  last 7 days   Lab Units 04/29/25 2016   HS TNI 0HR ng/L <2             Results from last 7 days   Lab Units 04/25/25  1425   TSH 3RD GENERATON uIU/mL 4.091         Results from last 7 days   Lab Units 04/29/25 2016   FERRITIN ng/mL 1*   IRON SATURATION % 4*   IRON ug/dL 21*   TIBC ug/dL 470.4*     Results from last 7 days   Lab Units 04/29/25 2016   TRANSFERRIN mg/dL 336     Results from last 7 days   Lab Units 04/30/25  0555   UNIT PRODUCT CODE  [id number]  [id number]   UNIT NUMBER  [id number]  [id number]   UNITABO  O  O   UNITRH  POS  POS   CROSSMATCH  Compatible  Compatible   UNIT DISPENSE STATUS  Presumed Trans  Presumed Trans   UNIT PRODUCT VOL mL 350  350       Results from last 7 days   Lab Units 04/29/25  2337   CLARITY UA  Clear   COLOR UA  Light Yellow   SPEC GRAV UA  1.020   PH UA  6.5   GLUCOSE UA mg/dl Negative   KETONES UA mg/dl Negative   BLOOD UA  Negative   PROTEIN UA mg/dl Negative   NITRITE UA  Negative   BILIRUBIN UA  Negative   UROBILINOGEN UA (BE) mg/dl <2.0   LEUKOCYTES UA  Negative   WBC UA /hpf 1-2   RBC UA /hpf None Seen   BACTERIA UA /hpf None Seen   EPITHELIAL CELLS WET PREP /hpf Occasional   MUCUS THREADS  Occasional*     Past Medical History:   Diagnosis Date    Abnormal Pap smear of cervix     2002 colpo WNL;     Anxiety     no meds since 2018    Gonorrhea 2002    Heart problem 2017    swelling of L side of heart - hospitalized x7 days @[facility], unsure of specifics, no issues thereafter     Present on Admission:   Iron deficiency anemia due to chronic blood loss   Anxiety and depression   Other chest pain      Admitting Diagnosis: Chest pain [R07.9]  Dyspnea [R06.00]  Symptomatic anemia [D64.9]  Age/Sex: 39 y.o. female    Network Utilization Review Department  ATTENTION: Please call with any questions or concerns to [phone] and carefully listen to the prompts so that you are directed to the right person. All voicemails are confidential.   For Discharge needs,  contact Care Management DC Support Team at [phone] opt. 2  Send all requests for admission clinical reviews, approved or denied determinations and any other requests to dedicated fax number below belonging to the campus where the patient is receiving treatment. List of dedicated fax numbers for the Facilities:  FACILITY NAME UR FAX NUMBER   ADMISSION DENIALS (Administrative/Medical Necessity) [phone]   DISCHARGE SUPPORT TEAM (NETWORK) [phone]   PARENT CHILD HEALTH (Maternity/NICU/Pediatrics) [phone]   [facility] [phone]   [facility] [phone]   [facility] [phone]   [facility] [phone]   [facility] [phone]   [facility] [phone]   [facility] [phone]   [facility] [phone]   [facility] [phone]   [facility] [phone]   [facility] [phone]   [facility] [phone]

## 2025-04-30 NOTE — ED ATTENDING ATTESTATION
4/29/2025  I, Jose Maurer MD, saw and evaluated the patient. I have discussed the patient with the resident/non-physician practitioner and agree with the resident's/non-physician practitioner's findings, Plan of Care, and MDM as documented in the resident's/non-physician practitioner's note, except where noted. All available labs and Radiology studies were reviewed.  I was present for key portions of any procedure(s) performed by the resident/non-physician practitioner and I was immediately available to provide assistance.       At this point I agree with the current assessment done in the Emergency Department.  I have conducted an independent evaluation of this patient a history and physical is as follows:    ED Course     Patient presents for evaluation due to several weeks of shortness of breath and lightheadedness.  Patient also reports having some chest pain.  She had outpatient lab work done which showed a hemoglobin of 6.2.  Patient has known history of fibroids and has been having heavy vaginal bleeding.  No additional complaints.  A/P: Symptomatic anemia.  Will check labs to confirm hemoglobin and check EKG and chest x-ray.    Critical Care Time  Procedures

## 2025-04-30 NOTE — H&P
H&P - Family Medicine   Name: Aleksey Johnson 39 y.o. female I MRN: [id number]  [facility] Date of Admission: 4/29/2025   Date of Service: 4/30/2025 I Hospital Day: 1     Assessment & Plan  Iron deficiency anemia due to chronic blood loss  Has a history of chronic anemia 2/2 chronic blood loss from AUB  For the past 2 to 3 weeks, patient has had worsening fatigue  She reports that her periods last about 7 days, most recently from around April 11 to 18  She has a history of receiving Venofer infusions, in 2023 and 2024  She previously had tried oral iron though it made her constipated and she could not tolerate it  She also previously was on Provera, and noticed some improvement to her AUB  Recent blood work from 4/25 showed hemoglobin 6.2 --patient was encouraged to go to the ED upon reviewing labs by PCP and GYN today  Originally scheduled for total hysterectomy with bilateral salpingectomy on May 2, however this has been postponed given her significant anemia  She also reported several day history of chest pain; she has a history of this that also radiates down her arm; negative cardiac workup in the past  Nuys any urinary bleeding or known GI bleeding; no other source of bleeding noted  In the ED, repeat hemoglobin was 5.6, hematocrit 22.6  Platelets 215 down from 293 last week  WBC 4.5 up from 3.84 last week  Iron panel notable for iron 21, ferritin 1  EKG performed in the ED showed NSR; troponins negative  Patient was given 2 units PRBCs in the ED  Overall benign examination in the ED; did have adequate cap refill but pallor of the palpebral conjunctiva    Impression: Symptomatic iron deficiency anemia in the setting of chronic blood loss from AUB    Hgb / Hct       Results from last 7 days   Lab Units 04/29/25 2016 04/25/25  1425   HEMOGLOBIN g/dL 5.6* 6.2*   HEMATOCRIT % 22.6* 24.0*       Iron Panel        Lab Results   Component Value Date    CONCFE 4 (L) 04/29/2025    CONCFE   12/19/2023      Comment:      Unable to Calculate.    TIBC 470.4 (H) 04/29/2025    TIBC <326 12/19/2023    IRON 21 (L) 04/29/2025    IRON <10 (L) 12/19/2023    FERRITIN 1 (L) 04/29/2025    FERRITIN 4 (L) 12/19/2023          Plan:  -Complete 2 units PRBCs  -Recheck CBC after transfusion  -AM CBC, BMP  -IV iron ordered to start 4/30  -UA with micro pending  -Transfuse hgb < 7  -Will need follow up outpatient with GYN, PCP  -Likely will need outpatient Venofer infusions vs compliance to oral iron  Other chest pain  Reports right arm tingling on/off with chest pain  Has been worked up for previously in 2020 for a similar type of pain.   Echo and stress testing normal at that time.    Has concurrent GERD symptoms  C/p happens with no obvious trigger such as exertion or food, happens when she is just sitting down and at times she will feel dizzy with the chest pain.  Was previously referred to cardiology but did not f/u    Troponin neg  EKG neg for ischemia  CXR neg    Her PCP ordered echocardiogram on 4/25 which has not been completed yet    Likely 2/2 iron deficiency anemia     Plan:   -Monitor  -Continue management for anemia  -Obtain echocardiogram while inpatient  Anxiety and depression  H/o anxiety and depression  Previously on Cymbalta 30mg daily, but has been off of this.  Now she takes Atarax 25mg nightly PRN     Plan:  -Continue home atarax PRN qhs      VTE Pharmacologic Prophylaxis: VTE Score: 2 Low Risk (Score 0-2) - Encourage Ambulation.  Code Status: Level 1 - Full Code     Anticipated Length of Stay: Patient will be admitted on an inpatient basis with an anticipated length of stay of greater than 2 midnights secondary to severe symptomatic anemia, with need for transfusions and IV iron.    History of Present Illness   Chief Complaint: Fatigue, chest pain    Aleksey Johnson is a 39 y.o. female with a PMH of chronic blood loss iron deficiency anemia who presents with fatigue and chest pain.    Patient  reports that for the past 2 to 3 weeks, she has felt pretty fatigued.  She does have chronic fatigue however she reports that this has been more severe.  She has had to call out of work for the past 3 to 4 days because she has felt too tired to go to work.  She does have a history of iron deficiency anemia, and previously had been on oral iron however could not tolerate this.  She was then transition to IV iron, and per chart review she had infusions in 2023 and 2024, but nothing for the past several months to about a year.  She does have AUB, and follows with GYN for this.  This is her source for chronic blood loss anemia.  She reports that her periods are about 7 days long and heavy.  She previously was on Provera which seem to shorten the duration of her periods and she felt symptomatically better.  She was scheduled for hysterectomy with bilateral salpingectomy on 5/2 however this was canceled given her significant anemia.  She reports that her last recent period was from around 4/11 to 4/18.    She saw her PCP on 4/25 and described both fatigue and chest pain.  PCP ordered labs including CBC.  Hemoglobin came back at 6.2, and patient was alerted today, 4/29, by both PCP and GYN to go to the ED given her symptomatic anemia.    For her chest pain, she does have a history of this.  She has had a full cardiac workup in [id number].  Echocardiogram and stress testing were normal.  She has had several EKGs since all of which have been normal and nonischemic.  She occasionally has pain that radiates down her arm.  Episodes of chest pain are unrelated necessarily to activity, food, position.  She does also report shortness of breath today.    In the ED, patient's repeat hemoglobin was 5.6.  She was given 2 units PRBCs by ED physician.  EKG, troponins, BMP, CXR unremarkable.  Iron panel significant for iron of 21, ferritin 1.  Was then admitted for symptomatic anemia.    Review of Systems   Constitutional:  Positive for  fatigue. Negative for chills, diaphoresis and fever.   HENT:  Negative for hearing loss.    Eyes:  Negative for visual disturbance.   Respiratory:  Positive for shortness of breath. Negative for chest tightness, wheezing and stridor.    Cardiovascular:  Positive for chest pain. Negative for palpitations and leg swelling.   Gastrointestinal:  Negative for abdominal distention, abdominal pain, blood in stool, constipation, diarrhea, nausea and vomiting.   Genitourinary:  Negative for dysuria, frequency, hematuria and urgency.   Musculoskeletal:  Negative for arthralgias, back pain, myalgias, neck pain and neck stiffness.   Neurological:  Negative for dizziness, syncope, weakness, light-headedness, numbness and headaches.       Historical Information   Past Medical History:   Diagnosis Date    Abnormal Pap smear of cervix      colpo WNL;     Anxiety     no meds since     Gonorrhea     Heart problem 2017    swelling of L side of heart - hospitalized x7 days @[place], unsure of specifics, no issues thereafter     Past Surgical History:   Procedure Laterality Date     SECTION      TUBAL LIGATION       Social History     Tobacco Use    Smoking status: Former     Current packs/day: 0.00     Types: Cigars, Cigarettes     Quit date:      Years since quittin.3     Passive exposure: Past    Smokeless tobacco: Never    Tobacco comments:     Uses hooka now   Vaping Use    Vaping status: Never Used   Substance and Sexual Activity    Alcohol use: Yes     Comment: rarely    Drug use: Yes     Types: Marijuana     Comment: daily marijuana use    Sexual activity: Yes     Partners: Male     Birth control/protection: Female Sterilization     E-Cigarette/Vaping    E-Cigarette Use Never User      E-Cigarette/Vaping Substances    Nicotine No     THC No     CBD No     Flavoring No     Other No     Unknown No      Family history non-contributory  Social History:  Marital Status: Single   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  Prior to Admission medications    Medication Sig Start Date End Date Taking? Authorizing Provider   acetaminophen (TYLENOL) 650 mg CR tablet Take 1 tablet (650 mg total) by mouth every 8 (eight) hours as needed for mild pain  Patient not taking: Reported on 11/6/2024 4/25/24   Fiona Pierce MD   DULoxetine (CYMBALTA) 30 mg delayed release capsule Take 1 capsule (30 mg total) by mouth daily  Patient not taking: Reported on 10/3/2024 9/13/23   Presley Conner MD   hydrOXYzine HCL (ATARAX) 25 mg tablet Take 1 tablet (25 mg total) by mouth every 6 (six) hours as needed for anxiety 7/9/24   Glenny Loco MD   imiquimod (ALDARA) 5 % cream Apply 1 packet topically 3 (three) times a week 10/4/24   Yardlie Toussaint-Foster, DO   medroxyPROGESTERone (PROVERA) 10 mg tablet Take 1 tablet (10 mg total) by mouth daily  Patient not taking: Reported on 5/22/2024 1/18/24   Yardlie Toussaint-Foster, DO   miSOPROStol (Cytotec) 200 mcg tablet Take one tablet orally the night prior to procedure then Take one tablet insert intravaginally the morning of the procedure at 5:30am  Patient not taking: Reported on 11/6/2024 5/22/24   Yardlie Toussaint-Foster, DO   naproxen (Naprosyn) 500 mg tablet Take 1 tablet (500 mg total) by mouth 2 (two) times a day with meals for 14 days 5/7/24 11/6/24  Victor Manuel Leger PA-C     No Known Allergies    Objective :  Temp:  [97.8 °F (36.6 °C)-98.9 °F (37.2 °C)] 98.4 °F (36.9 °C)  HR:  [64-84] 66  BP: (100-120)/(53-69) 119/69  Resp:  [16-21] 16  SpO2:  [98 %-100 %] 100 %  O2 Device: None (Room air)    Physical Exam  Vitals and nursing note reviewed.   Constitutional:       General: She is not in acute  distress.     Appearance: Normal appearance.   HENT:      Head: Normocephalic and atraumatic.      Right Ear: External ear normal.      Left Ear: External ear normal.      Nose: Nose normal.      Mouth/Throat:      Mouth: Mucous membranes are moist.      Pharynx: Oropharynx is clear.   Eyes:      Extraocular Movements: Extraocular movements intact.      Pupils: Pupils are equal, round, and reactive to light.      Comments: Palpebral conjunctiva pallor   Cardiovascular:      Rate and Rhythm: Normal rate and regular rhythm.      Heart sounds: Normal heart sounds. No murmur heard.  Pulmonary:      Effort: Pulmonary effort is normal. No respiratory distress.      Breath sounds: Normal breath sounds.   Abdominal:      General: Abdomen is flat. Bowel sounds are normal. There is no distension.      Palpations: Abdomen is soft. There is no mass.      Tenderness: There is no abdominal tenderness. There is no guarding or rebound.   Musculoskeletal:      Cervical back: Normal range of motion and neck supple.      Right lower leg: No edema.      Left lower leg: No edema.   Skin:     General: Skin is warm and dry.      Capillary Refill: Capillary refill takes less than 2 seconds.   Neurological:      Mental Status: She is alert and oriented to person, place, and time.   Psychiatric:         Mood and Affect: Mood normal.         Behavior: Behavior normal.         Lines/Drains:  PIV          Lab Results: I have reviewed the following results:  Results from last 7 days   Lab Units 04/29/25 2016   WBC Thousand/uL 4.05*   HEMOGLOBIN g/dL 5.6*   HEMATOCRIT % 22.6*   PLATELETS Thousands/uL 215   SEGS PCT % 48   LYMPHO PCT % 42   MONO PCT % 7   EOS PCT % 2     Results from last 7 days   Lab Units 04/29/25 2016   SODIUM mmol/L 138   POTASSIUM mmol/L 3.5   CHLORIDE mmol/L 108   CO2 mmol/L 26   BUN mg/dL 10   CREATININE mg/dL 0.65   ANION GAP mmol/L 4   CALCIUM mg/dL 8.6   GLUCOSE RANDOM mg/dL 102             Lab Results   Component  Value Date    HGBA1C 4.9 05/22/2024    HGBA1C 6.0 (H) 02/03/2024    HGBA1C 5.4 01/27/2022                 Administrative Statements     Guillermo Aj D.O.  [facility] PGY2  4/30/2025, 1:00 AM     ** Please Note: This note has been constructed using a voice recognition system. **

## 2025-04-30 NOTE — ASSESSMENT & PLAN NOTE
Reports right arm tingling on/off with chest pain  Has been worked up for previously in 2020 for a similar type of pain.   Echo and stress testing normal at that time.    Has concurrent GERD symptoms  C/p happens with no obvious trigger such as exertion or food, happens when she is just sitting down and at times she will feel dizzy with the chest pain.  Was previously referred to cardiology but did not f/u    Troponin neg  EKG neg for ischemia  CXR neg    Her PCP ordered echocardiogram on 4/25 which has not been completed yet    Likely 2/2 iron deficiency anemia     Plan:   -Monitor  -Continue management for anemia  -Obtain echocardiogram while inpatient

## 2025-04-30 NOTE — ASSESSMENT & PLAN NOTE
Has a history of chronic anemia 2/2 chronic blood loss from AUB  For the past 2 to 3 weeks, patient has had worsening fatigue  She reports that her periods last about 7 days, most recently from around April 11 to 18  She has a history of receiving Venofer infusions, in 2023 and 2024  She previously had tried oral iron though it made her constipated and she could not tolerate it  She also previously was on Provera, and noticed some improvement to her AUB  Recent blood work from 4/25 showed hemoglobin 6.2 --patient was encouraged to go to the ED upon reviewing labs by PCP and GYN today  Originally scheduled for total hysterectomy with bilateral salpingectomy on May 2, however this has been postponed given her significant anemia  She also reported several day history of chest pain; she has a history of this that also radiates down her arm; negative cardiac workup in the past  [place] any urinary bleeding or known GI bleeding; no other source of bleeding noted  In the ED, repeat hemoglobin was 5.6, hematocrit 22.6  Platelets 215 down from 293 last week  WBC 4.5 up from 3.84 last week  Iron panel notable for iron 21, ferritin 1  EKG performed in the ED showed NSR; troponins negative  Patient was given 2 units PRBCs in the ED  Overall benign examination in the ED; did have adequate cap refill but pallor of the palpebral conjunctiva    Impression: Symptomatic iron deficiency anemia in the setting of chronic blood loss from AUB    Hgb / Hct       Results from last 7 days   Lab Units 04/30/25  0612 04/29/25 2016 04/25/25  1425   HEMOGLOBIN g/dL 8.9*  8.7* 5.6* 6.2*   HEMATOCRIT % 30.6*  30.9* 22.6* 24.0*       Iron Panel        Lab Results   Component Value Date    CONCFE 4 (L) 04/29/2025    CONCFE  12/19/2023      Comment:      Unable to Calculate.    TIBC 470.4 (H) 04/29/2025    TIBC <326 12/19/2023    IRON 21 (L) 04/29/2025    IRON <10 (L) 12/19/2023    FERRITIN 1 (L) 04/29/2025    FERRITIN 4 (L) 12/19/2023      - S/p 2  units of PRBC with improvement in Hgb from 5.6 >8.9  - S/p 1 unit of Venofer     Plan:  - Complete Venofer infusions as ordered   - Start Ferrous Fumarate iron supplement daily, take with Vitamin C   - F/U with GI for concerns of malabsorption  - Start Provera 10mg OCP for regulation of heavy menstrual bleeding  - Start Vitamin D 50,000 units x 12 weeks  - Repeat CBC in one month  - F/U with PCP and OBGYN

## 2025-04-30 NOTE — UTILIZATION REVIEW
NOTIFICATION OF INPATIENT ADMISSION   AUTHORIZATION REQUEST   SERVICING FACILITY:   [facility]  Address: [address] [place] [address]  Tax ID: [id number]  NPI: [id number] ATTENDING PROVIDER:  Attending Name and NPI#: Jonn Sanchez Do [[id number]]  Address: [address] [place] [address]  Phone: [phone]   ADMISSION INFORMATION:  Place of Service: Inpatient [facility]  Place of Service Code: 21  Inpatient Admission Date/Time: 4/29/25 10:10 PM  Discharge Date/Time: No discharge date for patient encounter.  Admitting Diagnosis Code/Description:  Chest pain [R07.9]  Dyspnea [R06.00]  Symptomatic anemia [D64.9]     UTILIZATION REVIEW CONTACT:  Valerio Melendez Utilization   Network Utilization Review Department  Phone: [phone]  Fax: [phone]  Email: [email or web address]  Contact for approvals/pending authorizations, clinical reviews, and discharge.     PHYSICIAN ADVISORY SERVICES:  Medical Necessity Denial & Xmae-dz-Ywpx Review  Phone: [phone]  Fax: [phone]  Email: [email or web address]     DISCHARGE SUPPORT TEAM:  For Patients Discharge Needs & Updates  Phone: [phone] opt. 2 Fax: [phone]  Email: [email or web address]

## 2025-04-30 NOTE — HOSPITAL COURSE
Aleksey Johnson is a 39 y.o. female who was admitted on 4/29/2025 for symptomatic anemia.    She saw her PCP on 4/25 and described both fatigue and chest pain. PCP ordered labs including CBC. Hemoglobin came back at 6.2, and patient was alerted to go to the ED given her symptomatic anemia.     In the ED, patient's repeat hemoglobin was 5.6.  She was given 2 units PRBCs by ED physician.  EKG, troponins, BMP, CXR unremarkable.  Iron panel significant for iron of 21, ferritin 1. Given her significant anemia, she was placed on telemetry overnight on night of admission.

## 2025-04-30 NOTE — PLAN OF CARE
Problem: PAIN - ADULT  Goal: Verbalizes/displays adequate comfort level or baseline comfort level  Description: Interventions:- Encourage patient to monitor pain and request assistance- Assess pain using appropriate pain scale- Administer analgesics based on type and severity of pain and evaluate response- Implement non-pharmacological measures as appropriate and evaluate response- Consider cultural and social influences on pain and pain management- Notify physician/advanced practitioner if interventions unsuccessful or patient reports new pain  Outcome: Progressing     Problem: INFECTION - ADULT  Goal: Absence or prevention of progression during hospitalization  Description: INTERVENTIONS:- Assess and monitor for signs and symptoms of infection- Monitor lab/diagnostic results- Monitor all insertion sites, i.e. indwelling lines, tubes, and drains- Monitor endotracheal if appropriate and nasal secretions for changes in amount and color- [place] appropriate cooling/warming therapies per order- Administer medications as ordered- Instruct and encourage patient and family to use good hand hygiene technique- Identify and instruct in appropriate isolation precautions for identified infection/condition  Outcome: Progressing  Goal: Absence of fever/infection during neutropenic period  Description: INTERVENTIONS:- Monitor WBC  Outcome: Progressing     Problem: SAFETY ADULT  Goal: Patient will remain free of falls  Description: INTERVENTIONS:- Educate patient/family on patient safety including physical limitations- Instruct patient to call for assistance with activity - Consult OT/PT to assist with strengthening/mobility - Keep Call bell within reach- Keep bed low and locked with side rails adjusted as appropriate- Keep care items and personal belongings within reach- Initiate and maintain comfort rounds- Make Fall Risk Sign visible to staff- - Apply yellow socks and bracelet for high fall risk patients- Consider moving  patient to room near nurses station  Outcome: Progressing  Goal: Maintain or return to baseline ADL function  Description: INTERVENTIONS:-  Assess patient's ability to carry out ADLs; assess patient's baseline for ADL function and identify physical deficits which impact ability to perform ADLs (bathing, care of mouth/teeth, toileting, grooming, dressing, etc.)- Assess/evaluate cause of self-care deficits - Assess range of motion- Assess patient's mobility; develop plan if impaired- Assess patient's need for assistive devices and provide as appropriate- Encourage maximum independence but intervene and supervise when necessary- Involve family in performance of ADLs- Assess for home care needs following discharge - Consider OT consult to assist with ADL evaluation and planning for discharge- Provide patient education as appropriate  Outcome: Progressing  Goal: Maintains/Returns to pre admission functional level  Description: INTERVENTIONS:- Perform AM-PAC 6 Click Basic Mobility/ Daily Activity assessment daily.- Set and communicate daily mobility goal to care team and patient/family/caregiver. - Collaborate with rehabilitation services on mobility goals if consulted- Perform Range of Motion 3 times a day.  Outcome: Progressing     Problem: DISCHARGE PLANNING  Goal: Discharge to home or other facility with appropriate resources  Description: INTERVENTIONS:- Identify barriers to discharge w/patient and caregiver- Arrange for needed discharge resources and transportation as appropriate- Identify discharge learning needs (meds, wound care, etc.)- Arrange for interpretive services to assist at discharge as needed- Refer to Case Management Department for coordinating discharge planning if the patient needs post-hospital services based on physician/advanced practitioner order or complex needs related to functional status, cognitive ability, or social support system  Outcome: Progressing     Problem: Knowledge Deficit  Goal:  Patient/family/caregiver demonstrates understanding of disease process, treatment plan, medications, and discharge instructions  Description: Complete learning assessment and assess knowledge base.Interventions:- Provide teaching at level of understanding- Provide teaching via preferred learning methods  Outcome: Progressing     Problem: HEMATOLOGIC - ADULT  Goal: Maintains hematologic stability  Description: INTERVENTIONS- Assess for signs and symptoms of bleeding or hemorrhage- Monitor labs- Administer supportive blood products/factors as ordered and appropriate  Outcome: Progressing

## 2025-04-30 NOTE — DISCHARGE SUMMARY
Discharge Summary - Family Medicine   Name: Aleksey Johnson 39 y.o. female I MRN: [id number]  [facility] Date of Admission: 4/29/2025   Date of Service: 4/30/2025 I Hospital Day: 1     Assessment & Plan  Iron deficiency anemia due to chronic blood loss  Has a history of chronic anemia 2/2 chronic blood loss from AUB  For the past 2 to 3 weeks, patient has had worsening fatigue  She reports that her periods last about 7 days, most recently from around April 11 to 18  She has a history of receiving Venofer infusions, in 2023 and 2024  She previously had tried oral iron though it made her constipated and she could not tolerate it  She also previously was on Provera, and noticed some improvement to her AUB  Recent blood work from 4/25 showed hemoglobin 6.2 --patient was encouraged to go to the ED upon reviewing labs by PCP and GYN today  Originally scheduled for total hysterectomy with bilateral salpingectomy on May 2, however this has been postponed given her significant anemia  She also reported several day history of chest pain; she has a history of this that also radiates down her arm; negative cardiac workup in the past  [place] any urinary bleeding or known GI bleeding; no other source of bleeding noted  In the ED, repeat hemoglobin was 5.6, hematocrit 22.6  Platelets 215 down from 293 last week  WBC 4.5 up from 3.84 last week  Iron panel notable for iron 21, ferritin 1  EKG performed in the ED showed NSR; troponins negative  Patient was given 2 units PRBCs in the ED  Overall benign examination in the ED; did have adequate cap refill but pallor of the palpebral conjunctiva    Impression: Symptomatic iron deficiency anemia in the setting of chronic blood loss from AUB    Hgb / Hct       Results from last 7 days   Lab Units 04/30/25  0612 04/29/25 2016 04/25/25  1425   HEMOGLOBIN g/dL 8.9*  8.7* 5.6* 6.2*   HEMATOCRIT % 30.6*  30.9* 22.6* 24.0*       Iron Panel        Lab Results   Component  "Value Date    CONCFE 4 (L) 04/29/2025    CONCFE  12/19/2023      Comment:      Unable to Calculate.    TIBC 470.4 (H) 04/29/2025    TIBC <326 12/19/2023    IRON 21 (L) 04/29/2025    IRON <10 (L) 12/19/2023    FERRITIN 1 (L) 04/29/2025    FERRITIN 4 (L) 12/19/2023      - S/p 2 units of PRBC with improvement in Hgb from 5.6 >8.9  - S/p 1 unit of Venofer     Plan:  - Complete Venofer infusions as ordered   - Start Ferrous Fumarate iron supplement daily, take with Vitamin C   - F/U with GI for concerns of malabsorption  - Start Provera 10mg OCP for regulation of heavy menstrual bleeding  - Start Vitamin D 50,000 units x 12 weeks  - Repeat CBC in one month  - F/U with PCP and OBGYN  Other chest pain  Reports right arm tingling on/off with chest pain  Has been worked up for previously in 2020 for a similar type of pain.   Echo and stress testing normal at that time.    Has concurrent GERD symptoms  C/p happens with no obvious trigger such as exertion or food, happens when she is just sitting down and at times she will feel dizzy with the chest pain.  Was previously referred to cardiology but did not f/u    Troponin neg  EKG neg for ischemia  CXR neg    Her PCP ordered echocardiogram on 4/25 which has not been completed yet    Likely 2/2 iron deficiency anemia vs acid reflux     Plan:   -Monitor  -Continue management for anemia  - F/U with PCP for previously ordered Echo  Anxiety and depression  H/o anxiety and depression  Previously on Cymbalta 30mg daily, but has been off of this.  Now she takes Atarax 25mg nightly PRN     Plan:  -Continue home atarax PRN qhs       HPI: (per admission H&P note on 4/30/25)      \" Aleksey Johnson is a 39 y.o. female with a PMH of chronic blood loss iron deficiency anemia who presents with fatigue and chest pain.     Patient reports that for the past 2 to 3 weeks, she has felt pretty fatigued.  She does have chronic fatigue however she reports that this has been more severe.  She " "has had to call out of work for the past 3 to 4 days because she has felt too tired to go to work.  She does have a history of iron deficiency anemia, and previously had been on oral iron however could not tolerate this.  She was then transition to IV iron, and per chart review she had infusions in 2023 and 2024, but nothing for the past several months to about a year.  She does have AUB, and follows with GYN for this.  This is her source for chronic blood loss anemia.  She reports that her periods are about 7 days long and heavy.  She previously was on Provera which seem to shorten the duration of her periods and she felt symptomatically better.  She was scheduled for hysterectomy with bilateral salpingectomy on 5/2 however this was canceled given her significant anemia.  She reports that her last recent period was from around 4/11 to 4/18.     She saw her PCP on 4/25 and described both fatigue and chest pain.  PCP ordered labs including CBC.  Hemoglobin came back at 6.2, and patient was alerted today, 4/29, by both PCP and GYN to go to the ED given her symptomatic anemia.     For her chest pain, she does have a history of this.  She has had a full cardiac workup in [id number].  Echocardiogram and stress testing were normal.  She has had several EKGs since all of which have been normal and nonischemic.  She occasionally has pain that radiates down her arm.  Episodes of chest pain are unrelated necessarily to activity, food, position.  She does also report shortness of breath today.     In the ED, patient's repeat hemoglobin was 5.6.  She was given 2 units PRBCs by ED physician.  EKG, troponins, BMP, CXR unremarkable.  Iron panel significant for iron of 21, ferritin 1.  Was then admitted for symptomatic anemia.\"      HOSPITAL COURSE:   Please see progress note on discharge day for detailed list of diagnoses and related plan for additional information.    Hospital Course:   39 y.o. female admitted on 4/29/2025 for " fatigue and chest pain. Patient received 1 units of PRBCs in the ED to which her Hgb responded appropriately. AM Hgb was 8.7/8.9. No events occurred overnight. Patient received one dose of Venofer 200mg in the AM of 4/30.     Patient endorsed improvement in her fatigue and chest pain sx s/p the infusions.  In the setting of patient having severe iron deficiency with symptomatic anemia, severe vitamin D deficiency and menorrhagia, patient is to continue Venofer infusions outpatient. Discussed with patient that an oral contraceptive pill could help regulate her heavy menstrual cycle bleeding. Per chart review, patient was previously prescribed Provera 10mg daily however patient states she never took oral contraceptive pills previously. She reports she only received the  Depo injection previously. Also discussed with patient that her severe iron deficiency could be due to issues with malabsorption. Recommended patient establish care with GI to evaluate this further. Of note, patient is a vegetarian for 2 years but notes she was anemic for years prior to becoming a vegetarian.    Reviewed discharge plan with patient at length: Patient is to receive outpatient Venofer for 2 more doses, improve her vitamin D levels with high dose Vit D weekly x 12 weeks, start oral ferrous fumarate on a daily basis, follow-up and make an appointment with gastroenterology to rule out malabsorption as she has low iron and low vitamin D.    On 04/30/25, HD# 1, pt remains stable and is medically cleared for discharge home  with instruction for medication and compliance and further Venofer treatments next week. Patient will need close follow-up of her iron, vitamin D levels and may require further Venofer infusions.  A repeat CBC has been ordered to be completed in one month.  Patient will need to make close follow-up appointment with PCP and other providers listed on AVS including GI. Patient to continue further discussions with her OBGYN  "about now canceled hysterectomy that was scheduled for 5/2/25.     Patient is informed of and is aware of current health status and understand the plan of treatment and outpatient follow-up. The patient understood and agreed with the plan. All pertinent lab results, imaging studies, procedures, and/or any incidental findings have been disclosed to the patient. All pertinent questions are answered to patient's satisfaction.    Complications: NONE     Condition at Discharge: stable       PROCEDURES:     Procedures Performed:     No orders of the defined types were placed in this encounter.        SIGNIFICANT FINDINGS / ABNORMAL RESULTS:     Significant Findings/Abnormal Results with this admission:    XR chest 2 views  Result Date: 4/30/2025  Narrative: XR CHEST PA AND LATERAL INDICATION: sob, cough, cp. COMPARISON: 2/3/2024 FINDINGS: Clear lungs. No pneumothorax or pleural effusion. Normal cardiomediastinal silhouette. Bones are unremarkable for age. No free air in the upper abdomen.     Impression: No acute cardiopulmonary disease. Workstation performed: [id number]         VITALS ON DISCHARGE DATE:     Vitals  Blood Pressure: 111/62 (04/30/25 0731)  Temperature: 98.4 °F (36.9 °C) (04/30/25 0731)  Temp Source: Oral (04/30/25 0731)  Pulse: 70 (04/30/25 0731)  Respirations: 16 (04/30/25 0731)  SpO2: 100 % (04/30/25 0731)  Height: 5' 3\" (160 cm) (04/30/25 0032)  Weight - Scale: 82 kg (180 lb 12.4 oz) (04/30/25 0032)    Temp:  [97.8 °F (36.6 °C)-98.9 °F (37.2 °C)] 98.4 °F (36.9 °C)  HR:  [64-84] 70  BP: (100-120)/(53-69) 111/62  Resp:  [16-21] 16  SpO2:  [98 %-100 %] 100 %  O2 Device: None (Room air)    Weight (last 2 days)       Date/Time Weight    04/30/25 0032 82 (180.78)              Intake/Output Summary (Last 24 hours) at 4/30/2025 1400  Last data filed at 4/30/2025 0601  Gross per 24 hour   Intake 940 ml   Output 0 ml   Net 940 ml       Invasive Devices       None                     PHYSICAL EXAM ON DAY OF " DISCHARGE:     Physical Exam  Constitutional:       Appearance: Normal appearance.   HENT:      Head: Normocephalic and atraumatic.      Right Ear: External ear normal.      Left Ear: External ear normal.      Nose: Nose normal.      Mouth/Throat:      Mouth: Mucous membranes are moist.      Pharynx: Oropharynx is clear.   Eyes:      Conjunctiva/sclera: Conjunctivae normal.   Cardiovascular:      Rate and Rhythm: Normal rate and regular rhythm.   Pulmonary:      Effort: Pulmonary effort is normal. No respiratory distress.      Breath sounds: Normal breath sounds. No wheezing.   Abdominal:      General: Bowel sounds are normal.      Palpations: Abdomen is soft.      Tenderness: There is no abdominal tenderness.   Skin:     General: Skin is dry.   Neurological:      Mental Status: She is alert and oriented to person, place, and time.   Psychiatric:         Mood and Affect: Mood normal.         Behavior: Behavior normal.           DISCHARGE MEDICATIONS:     Discharge Medications:  See after visit summary (AVS) for detailed reconciled discharge medications, which was provided to patient and family. Summary of medication changes made with this admission:    START:  Vitamin D 50,000 units once a week x 12 weeks  Ferrous Fumarate daily with Vitamin C  Start Provera 10mg daily for regulating bleeding with menstrual cycles  Increase intake of dark leafy greens    STOP:  None     CHANGE:  None    RESUME:  All other home medications       FOLLOW-UP APPOINTMENTS / INSTRUCTION :     Important Physician Related Follow Up:     Monika Gage PA-C  [address]  [facility] [address]  [phone]    Schedule an appointment as soon as possible for a visit in 1 week(s)  Hospital follow up    Diana M Jaiyeola, MD  [address]  [facility] [address]  [phone]    Schedule an appointment as soon as possible for a visit in 1 week(s)  [facility]    [facility] [address]  [facility] [address]  [phone]  Schedule an appointment as soon as possible for a visit  Follow up with OBGYN about further management of Fibroids        Comfirmed future (up-coming) appointments:  Future Appointments   Date Time Provider Department Center   5/9/2025  2:00 PM Monika Gage PA-C [facility]   5/13/2025  8:20 AM [facility]       Discharge instructions/Information to patient and family:   See after visit summary (AVS) for information provided to patient and family.      Provisions for Follow-Up Care:  See after visit summary for information related to follow-up care and any pertinent home health orders.        DISPOSITION:     Disposition: Home    Discharge Statement   I spent 30  minutes discharging the patient. This time was spent on the day of discharge. I had direct contact with the patient on the day of discharge. Additional documentation is required if more than 30 minutes were spent on discharge.     Planned Readmission: No      Serge Ramos MD  PGY-2, Family Medicine  04/30/25  2:00 PM

## 2025-05-01 ENCOUNTER — TRANSITIONAL CARE MANAGEMENT (OUTPATIENT)
Dept: FAMILY MEDICINE CLINIC | Facility: CLINIC | Age: 40
End: 2025-05-01

## 2025-05-01 LAB
QRS AXIS: 25 DEGREES
QRSD INTERVAL: 86 MS
QT INTERVAL: 360 MS
QTC INTERVAL: 421 MS
T WAVE AXIS: 45 DEGREES
VENTRICULAR RATE: 82 BPM

## 2025-05-01 PROCEDURE — 93010 ELECTROCARDIOGRAM REPORT: CPT | Performed by: INTERNAL MEDICINE

## 2025-05-08 ENCOUNTER — OFFICE VISIT (OUTPATIENT)
Dept: FAMILY MEDICINE CLINIC | Facility: CLINIC | Age: 40
End: 2025-05-08

## 2025-05-08 VITALS
RESPIRATION RATE: 18 BRPM | HEART RATE: 73 BPM | DIASTOLIC BLOOD PRESSURE: 60 MMHG | SYSTOLIC BLOOD PRESSURE: 100 MMHG | WEIGHT: 176 LBS | BODY MASS INDEX: 31.18 KG/M2 | HEIGHT: 63 IN | OXYGEN SATURATION: 99 % | TEMPERATURE: 98.7 F

## 2025-05-08 DIAGNOSIS — F32.A ANXIETY AND DEPRESSION: Chronic | ICD-10-CM

## 2025-05-08 DIAGNOSIS — D50.0 IRON DEFICIENCY ANEMIA DUE TO CHRONIC BLOOD LOSS: ICD-10-CM

## 2025-05-08 DIAGNOSIS — Z09 HOSPITAL DISCHARGE FOLLOW-UP: Primary | ICD-10-CM

## 2025-05-08 DIAGNOSIS — E55.9 VITAMIN D DEFICIENCY: ICD-10-CM

## 2025-05-08 DIAGNOSIS — F41.9 ANXIETY AND DEPRESSION: Chronic | ICD-10-CM

## 2025-05-08 DIAGNOSIS — D64.9 CHRONIC ANEMIA: ICD-10-CM

## 2025-05-08 PROCEDURE — 99495 TRANSJ CARE MGMT MOD F2F 14D: CPT | Performed by: PHYSICIAN ASSISTANT

## 2025-05-08 RX ORDER — SODIUM CHLORIDE 9 MG/ML
20 INJECTION, SOLUTION INTRAVENOUS ONCE
Status: CANCELLED | OUTPATIENT
Start: 2025-05-12

## 2025-05-08 RX ORDER — SODIUM CHLORIDE 9 MG/ML
20 INJECTION, SOLUTION INTRAVENOUS ONCE
Status: CANCELLED | OUTPATIENT
Start: 2025-05-09

## 2025-05-08 RX ORDER — HYDROXYZINE HYDROCHLORIDE 25 MG/1
25 TABLET, FILM COATED ORAL EVERY 6 HOURS PRN
Qty: 30 TABLET | Refills: 2 | Status: SHIPPED | OUTPATIENT
Start: 2025-05-08

## 2025-05-08 NOTE — ASSESSMENT & PLAN NOTE
- Reviewed vitamin D level from April 2025.  Level very low.  Patient started on vitamin D 50,000 units once weekly.  Continue as prescribed.

## 2025-05-08 NOTE — ASSESSMENT & PLAN NOTE
-Reviewed hospital notes from 4/29/2025 - 4/30/2025.  Patient received 2 units PRBCs in ED and 1 dose of venofer.  Referral made to gastroenterology to rule out malabsorption.  - Patient advised to contact gastroenterology to schedule an appointment.  -Advised to schedule follow-up with OB/GYN.  Patient originally scheduled for hysterectomy on 5-25, but this was canceled due to symptomatic anemia.  - Patient would benefit from further Venofer infusions.  Will restart Venofer infusions twice weekly at [place].  -Continue daily iron supplement and vitamin D supplement.  - Continue Provera 10 mg daily.  - Advised to contact the office or report to ED if symptoms persist, worsen, or new symptoms arise.

## 2025-05-08 NOTE — PROGRESS NOTES
Transition of Care Visit:  Name: Aleksey Johnson      : 1985      MRN: [id number]  Encounter Provider: Monika Gage PA-C  Encounter Date: 2025   Encounter department: [facility]    Assessment & Plan  Hospital discharge follow-up         Anxiety and depression  -Stable.  Continue hydroxyzine 25 mg, every 6 hours as needed for anxiety.  - Continue coping mechanisms.  Orders:    hydrOXYzine HCL (ATARAX) 25 mg tablet; Take 1 tablet (25 mg total) by mouth every 6 (six) hours as needed for anxiety    Iron deficiency anemia due to chronic blood loss  -Reviewed hospital notes from 2025 - 2025.  Patient received 2 units PRBCs in ED and 1 dose of venofer.  Referral made to gastroenterology to rule out malabsorption.  - Patient advised to contact gastroenterology to schedule an appointment.  -Advised to schedule follow-up with OB/GYN.  Patient originally scheduled for hysterectomy on , but this was canceled due to symptomatic anemia.  - Patient would benefit from further Venofer infusions.  Will restart Venofer infusions twice weekly at [facility].  -Continue daily iron supplement and vitamin D supplement.  - Continue Provera 10 mg daily.  - Advised to contact the office or report to ED if symptoms persist, worsen, or new symptoms arise.           Chronic anemia             Vitamin D deficiency  - Reviewed vitamin D level from 2025.  Level very low.  Patient started on vitamin D 50,000 units once weekly.  Continue as prescribed.         BMI Counseling: Body mass index is 31.18 kg/m². The BMI is above normal. Nutrition recommendations include decreasing portion sizes, encouraging healthy choices of fruits and vegetables, consuming healthier snacks, limiting drinks that contain sugar, moderation in carbohydrate intake, increasing intake of lean protein and reducing intake of cholesterol. Exercise recommendations include moderate physical  activity 150 minutes/week. No pharmacotherapy was ordered. Rationale for BMI follow-up plan is due to patient being overweight or obese.         History of Present Illness     Transitional Care Management Review:   Aleksey Johnson is a 39 y.o. female here for TCM follow up.     During the TCM phone call patient stated:  TCM Call (since 4/27/2025)       Date and time call was made  5/1/2025  5:36 PM    Hospital care reviewed  Records reviewed    Patient was hospitialized at  [facility]    Date of Admission  04/29/25    Date of discharge  04/30/25    Diagnosis  Iron deficiency anemia due to chronic blood loss    Disposition  Home    Were the patients medications reviewed and updated  Yes    Current Symptoms  None          TCM Call (since 4/27/2025)       Post hospital issues  None    Scheduled for follow up?  Yes    Did you obtain your prescribed medications  Yes    Do you need help managing your prescriptions or medications  No    Is transportation to your appointment needed  No    I have advised the patient to call PCP with any new or worsening symptoms  Omaira Curry RN          Patient is a 39 y.o. female whom  has a past medical history of Abnormal Pap smear of cervix, Anxiety, Gonorrhea (2002), and Heart problem (2017). who is seen today in office for TCM.    -Patient had presented to [facility] ED on 4/29/2025 due to symptomatic anemia.  Patient had outpatient labs ordered and hemoglobin came back at 6.2.  Patient advised to go to ED given symptomatic anemia.  In the ED, patient's repeat hemoglobin was 5.6.  Patient given 2 units PRBCs by ED physician.  EKG, troponins, BMP, chest x-ray unremarkable.  Patient received 1 dose of Venofer 200 mg during hospital stay.  Patient discharged to home on 4/30/2025.    -Today, patient notes overall she is feeling much better.  Patient notes her fatigue has improved.  Patient notes she is taking her iron supplement and Provera daily.  Patient  "notes usually her menstrual cycle comes between 12-14th of the month, but it came yesterday.  Patient notes so far the bleeding has been the usual amount.      Review of Systems   Constitutional:  Positive for fatigue (improving). Negative for chills and fever.   Eyes:  Negative for visual disturbance.   Respiratory:  Negative for cough and shortness of breath.    Cardiovascular:  Negative for chest pain and palpitations.   Gastrointestinal:  Negative for abdominal pain, diarrhea, nausea and vomiting.   Genitourinary:  Negative for difficulty urinating and hematuria.   Skin:  Negative for pallor.   Neurological:  Positive for dizziness (improving) and light-headedness (improving).   Psychiatric/Behavioral:  Negative for confusion.      Objective   /60 (BP Location: Left arm, Patient Position: Sitting, Cuff Size: Standard)   Pulse 73   Temp 98.7 °F (37.1 °C) (Temporal)   Resp 18   Ht 5' 3\" (1.6 m)   Wt 79.8 kg (176 lb)   LMP 04/10/2025 (Exact Date)   SpO2 99%   Breastfeeding No   BMI 31.18 kg/m²     Physical Exam  Vitals and nursing note reviewed.   Constitutional:       General: She is not in acute distress.     Appearance: She is well-developed.   HENT:      Head: Normocephalic and atraumatic.      Right Ear: External ear normal.      Left Ear: External ear normal.      Nose: Nose normal.      Mouth/Throat:      Pharynx: Uvula midline.   Eyes:      Conjunctiva/sclera: Conjunctivae normal.   Cardiovascular:      Rate and Rhythm: Normal rate and regular rhythm.      Pulses: Normal pulses.      Heart sounds: Normal heart sounds. No murmur heard.  Pulmonary:      Effort: Pulmonary effort is normal. No respiratory distress.      Breath sounds: Normal breath sounds. No wheezing.   Abdominal:      General: Bowel sounds are normal.      Palpations: Abdomen is soft.      Tenderness: There is no abdominal tenderness.   Musculoskeletal:      Cervical back: Normal range of motion and neck supple.   Skin:     " General: Skin is warm.   Neurological:      Mental Status: She is alert and oriented to person, place, and time.   Psychiatric:         Speech: Speech normal.         Behavior: Behavior normal.       Medications have been reviewed by provider in current encounter

## 2025-05-08 NOTE — ASSESSMENT & PLAN NOTE
-Stable.  Continue hydroxyzine 25 mg, every 6 hours as needed for anxiety.  - Continue coping mechanisms.  Orders:    hydrOXYzine HCL (ATARAX) 25 mg tablet; Take 1 tablet (25 mg total) by mouth every 6 (six) hours as needed for anxiety

## 2025-05-08 NOTE — PATIENT INSTRUCTIONS
[facility]  [phone]       [facility] [place] - [address]  [facility] [address]  [phone]      [facility]  Phone Number: [phone]

## 2025-05-21 ENCOUNTER — HOSPITAL ENCOUNTER (OUTPATIENT)
Dept: NON INVASIVE DIAGNOSTICS | Facility: HOSPITAL | Age: 40
Discharge: HOME | End: 2025-05-21
Payer: MEDICARE

## 2025-05-21 VITALS
WEIGHT: 176 LBS | HEART RATE: 73 BPM | DIASTOLIC BLOOD PRESSURE: 60 MMHG | HEIGHT: 63 IN | SYSTOLIC BLOOD PRESSURE: 100 MMHG | BODY MASS INDEX: 31.18 KG/M2

## 2025-05-21 DIAGNOSIS — R53.83 OTHER FATIGUE: ICD-10-CM

## 2025-05-21 DIAGNOSIS — R07.89 OTHER CHEST PAIN: ICD-10-CM

## 2025-05-21 LAB
AORTIC ROOT: 3 CM
ASCENDING AORTA: 3.5 CM
BSA FOR ECHO PROCEDURE: 1.83 M2
E WAVE DECELERATION TIME: 176 MS
E/A RATIO: 0.94
FRACTIONAL SHORTENING: 29 (ref 28–44)
INTERVENTRICULAR SEPTUM IN DIASTOLE (PARASTERNAL SHORT AXIS VIEW): 1.5 CM
INTERVENTRICULAR SEPTUM: 1.5 CM (ref 0.6–1.1)
IVC: 5 MM
LAAS-AP2: 14.7 CM2
LAAS-AP4: 15.3 CM2
LEFT ATRIUM SIZE: 3.8 CM
LEFT ATRIUM VOLUME (MOD BIPLANE): 39 ML
LEFT ATRIUM VOLUME INDEX (MOD BIPLANE): 26.2 ML/M2
LEFT INTERNAL DIMENSION IN SYSTOLE: 2.7 CM (ref 2.1–4)
LEFT VENTRICULAR INTERNAL DIMENSION IN DIASTOLE: 3.8 CM (ref 3.5–6)
LEFT VENTRICULAR POSTERIOR WALL IN END DIASTOLE: 1.3 CM
LEFT VENTRICULAR STROKE VOLUME: 35 ML
LV EF US.2D.A4C+ESTIMATED: 68 %
LVSV (TEICH): 35 ML
MV E'TISSUE VEL-LAT: 10 CM/S
MV E'TISSUE VEL-SEP: 8 CM/S
MV PEAK A VEL: 0.62 M/S
MV PEAK E VEL: 58 CM/S
MV STENOSIS PRESSURE HALF TIME: 51 MS
MV VALVE AREA P 1/2 METHOD: 4.31
RA PRESSURE ESTIMATED: 3 MMHG
RIGHT ATRIUM AREA SYSTOLE A4C: 10 CM2
RIGHT VENTRICLE ID DIMENSION: 3.1 CM
RV PSP: 20 MMHG
SL CV LEFT ATRIUM LENGTH A2C: 4.5 CM
SL CV LV EF: 65
SL CV PED ECHO LEFT VENTRICLE DIASTOLIC VOLUME (MOD BIPLANE) 2D: 61 ML
SL CV PED ECHO LEFT VENTRICLE SYSTOLIC VOLUME (MOD BIPLANE) 2D: 26 ML
TR MAX PG: 17 MMHG
TR PEAK VELOCITY: 2.1 M/S
TRICUSPID ANNULAR PLANE SYSTOLIC EXCURSION: 2.1 CM
TRICUSPID VALVE PEAK REGURGITATION VELOCITY: 2.09 M/S

## 2025-05-21 PROCEDURE — 93306 TTE W/DOPPLER COMPLETE: CPT | Performed by: INTERNAL MEDICINE

## 2025-05-21 PROCEDURE — 93306 TTE W/DOPPLER COMPLETE: CPT

## 2025-05-22 ENCOUNTER — HOSPITAL ENCOUNTER (OUTPATIENT)
Dept: INFUSION CENTER | Facility: HOSPITAL | Age: 40
End: 2025-05-22
Attending: FAMILY MEDICINE
Payer: MEDICARE

## 2025-05-22 VITALS
SYSTOLIC BLOOD PRESSURE: 119 MMHG | DIASTOLIC BLOOD PRESSURE: 77 MMHG | TEMPERATURE: 97.7 F | RESPIRATION RATE: 18 BRPM | HEART RATE: 76 BPM

## 2025-05-22 DIAGNOSIS — D64.9 CHRONIC ANEMIA: ICD-10-CM

## 2025-05-22 DIAGNOSIS — D50.0 IRON DEFICIENCY ANEMIA DUE TO CHRONIC BLOOD LOSS: Primary | ICD-10-CM

## 2025-05-22 PROCEDURE — 96365 THER/PROPH/DIAG IV INF INIT: CPT

## 2025-05-22 RX ORDER — SODIUM CHLORIDE 9 MG/ML
20 INJECTION, SOLUTION INTRAVENOUS ONCE
Status: CANCELLED | OUTPATIENT
Start: 2025-05-23

## 2025-05-22 RX ORDER — SODIUM CHLORIDE 9 MG/ML
20 INJECTION, SOLUTION INTRAVENOUS ONCE
Status: COMPLETED | OUTPATIENT
Start: 2025-05-22 | End: 2025-05-22

## 2025-05-22 RX ADMIN — SODIUM CHLORIDE 20 ML/HR: 0.9 INJECTION, SOLUTION INTRAVENOUS at 11:52

## 2025-05-22 RX ADMIN — IRON SUCROSE 200 MG: 20 INJECTION, SOLUTION INTRAVENOUS at 11:55

## 2025-05-22 NOTE — PLAN OF CARE
Problem: Potential for Falls  Goal: Patient will remain free of falls  Description: INTERVENTIONS:  - Educate patient/family on patient safety including physical limitations  - Instruct patient to call for assistance with activity   - Consider consulting OT/PT to assist with strengthening/mobility based on AM PAC & JH-HLM score  - Consult OT/PT to assist with strengthening/mobility   - Keep Call bell within reach  - Keep bed low and locked with side rails adjusted as appropriate  - Keep care items and personal belongings within reach  - Initiate and maintain comfort rounds  - Make Fall Risk Sign visible to staff  - Offer Toileting every  Hours, in advance of need  - Initiate/Maintain alarm  - Obtain necessary fall risk management equipment:   - Apply yellow socks and bracelet for high fall risk patients  - Consider moving patient to room near nurses station  Outcome: Progressing     Problem: Knowledge Deficit  Goal: Patient/family/caregiver demonstrates understanding of disease process, treatment plan, medications, and discharge instructions  Description: Complete learning assessment and assess knowledge base.  Interventions:  - Provide teaching at level of understanding  - Provide teaching via preferred learning methods  Outcome: Progressing

## 2025-05-22 NOTE — PROGRESS NOTES
Pt tolerated venofer infusion without difficulty.  Confirmed next appt on 5/27 at 1230 at [facility].  [facility] declined.  Left ambulatory in stable condition.

## 2025-05-23 DIAGNOSIS — D50.0 IRON DEFICIENCY ANEMIA DUE TO CHRONIC BLOOD LOSS: Primary | ICD-10-CM

## 2025-05-23 DIAGNOSIS — D64.9 CHRONIC ANEMIA: ICD-10-CM

## 2025-05-23 RX ORDER — SODIUM CHLORIDE 9 MG/ML
20 INJECTION, SOLUTION INTRAVENOUS ONCE
OUTPATIENT
Start: 2025-05-23

## 2025-05-27 ENCOUNTER — RESULTS FOLLOW-UP (OUTPATIENT)
Dept: FAMILY MEDICINE CLINIC | Facility: CLINIC | Age: 40
End: 2025-05-27

## 2025-05-30 ENCOUNTER — HOSPITAL ENCOUNTER (OUTPATIENT)
Dept: INFUSION CENTER | Facility: CLINIC | Age: 40
Discharge: HOME | End: 2025-05-30
Attending: FAMILY MEDICINE
Payer: MEDICARE

## 2025-05-30 VITALS
SYSTOLIC BLOOD PRESSURE: 123 MMHG | RESPIRATION RATE: 18 BRPM | HEART RATE: 68 BPM | TEMPERATURE: 97 F | DIASTOLIC BLOOD PRESSURE: 70 MMHG

## 2025-05-30 DIAGNOSIS — D50.0 IRON DEFICIENCY ANEMIA DUE TO CHRONIC BLOOD LOSS: ICD-10-CM

## 2025-05-30 DIAGNOSIS — D64.9 CHRONIC ANEMIA: Primary | ICD-10-CM

## 2025-05-30 PROCEDURE — 96365 THER/PROPH/DIAG IV INF INIT: CPT

## 2025-05-30 RX ORDER — SODIUM CHLORIDE 9 MG/ML
20 INJECTION, SOLUTION INTRAVENOUS ONCE
Status: COMPLETED | OUTPATIENT
Start: 2025-05-30 | End: 2025-05-30

## 2025-05-30 RX ORDER — SODIUM CHLORIDE 9 MG/ML
20 INJECTION, SOLUTION INTRAVENOUS ONCE
Status: CANCELLED | OUTPATIENT
Start: 2025-05-30

## 2025-05-30 RX ADMIN — IRON SUCROSE 200 MG: 20 INJECTION, SOLUTION INTRAVENOUS at 13:29

## 2025-05-30 RX ADMIN — SODIUM CHLORIDE 20 ML/HR: 0.9 INJECTION, SOLUTION INTRAVENOUS at 13:26

## 2025-05-30 NOTE — PROGRESS NOTES
Aleksey Johnson  tolerated treatment well with no complications.      Aleksey Johnson is aware of future appt on 6/4/25 at 2pm. @ [facility].       No (Declined by Aleksey Johnson)

## 2025-06-03 DIAGNOSIS — D64.9 CHRONIC ANEMIA: ICD-10-CM

## 2025-06-03 DIAGNOSIS — D50.0 IRON DEFICIENCY ANEMIA DUE TO CHRONIC BLOOD LOSS: Primary | ICD-10-CM

## 2025-06-03 RX ORDER — SODIUM CHLORIDE 9 MG/ML
20 INJECTION, SOLUTION INTRAVENOUS ONCE
OUTPATIENT
Start: 2025-06-04

## 2025-06-16 ENCOUNTER — HOSPITAL ENCOUNTER (OUTPATIENT)
Dept: INFUSION CENTER | Facility: HOSPITAL | Age: 40
Discharge: HOME | End: 2025-06-16
Attending: FAMILY MEDICINE
Payer: MEDICARE

## 2025-06-16 VITALS
DIASTOLIC BLOOD PRESSURE: 69 MMHG | RESPIRATION RATE: 18 BRPM | HEART RATE: 76 BPM | SYSTOLIC BLOOD PRESSURE: 102 MMHG | TEMPERATURE: 97 F

## 2025-06-16 DIAGNOSIS — D64.9 CHRONIC ANEMIA: ICD-10-CM

## 2025-06-16 DIAGNOSIS — D50.0 IRON DEFICIENCY ANEMIA DUE TO CHRONIC BLOOD LOSS: Primary | ICD-10-CM

## 2025-06-16 PROCEDURE — 96365 THER/PROPH/DIAG IV INF INIT: CPT

## 2025-06-16 RX ORDER — SODIUM CHLORIDE 9 MG/ML
20 INJECTION, SOLUTION INTRAVENOUS ONCE
OUTPATIENT
Start: 2025-06-23

## 2025-06-16 RX ORDER — SODIUM CHLORIDE 9 MG/ML
20 INJECTION, SOLUTION INTRAVENOUS ONCE
Status: COMPLETED | OUTPATIENT
Start: 2025-06-16 | End: 2025-06-16

## 2025-06-16 RX ADMIN — SODIUM CHLORIDE 20 ML/HR: 0.9 INJECTION, SOLUTION INTRAVENOUS at 13:16

## 2025-06-16 RX ADMIN — IRON SUCROSE 200 MG: 20 INJECTION, SOLUTION INTRAVENOUS at 13:19

## 2025-06-16 NOTE — PROGRESS NOTES
Pt tolerated venofer infusion without difficulty.  Scheduling subsequent appointments at the  on her way out.  Left ambulatory in stable condition.

## 2025-07-07 ENCOUNTER — HOSPITAL ENCOUNTER (OUTPATIENT)
Dept: INFUSION CENTER | Facility: HOSPITAL | Age: 40
Discharge: HOME | End: 2025-07-07
Attending: FAMILY MEDICINE
Payer: MEDICARE

## 2025-07-07 VITALS
HEART RATE: 89 BPM | SYSTOLIC BLOOD PRESSURE: 100 MMHG | RESPIRATION RATE: 18 BRPM | TEMPERATURE: 97.9 F | DIASTOLIC BLOOD PRESSURE: 69 MMHG

## 2025-07-07 DIAGNOSIS — D50.0 IRON DEFICIENCY ANEMIA DUE TO CHRONIC BLOOD LOSS: Primary | ICD-10-CM

## 2025-07-07 DIAGNOSIS — D64.9 CHRONIC ANEMIA: ICD-10-CM

## 2025-07-07 PROCEDURE — 96365 THER/PROPH/DIAG IV INF INIT: CPT

## 2025-07-07 RX ORDER — SODIUM CHLORIDE 9 MG/ML
20 INJECTION, SOLUTION INTRAVENOUS ONCE
Status: COMPLETED | OUTPATIENT
Start: 2025-07-07 | End: 2025-07-07

## 2025-07-07 RX ORDER — SODIUM CHLORIDE 9 MG/ML
20 INJECTION, SOLUTION INTRAVENOUS ONCE
OUTPATIENT
Start: 2025-07-14

## 2025-07-07 RX ADMIN — IRON SUCROSE 200 MG: 20 INJECTION, SOLUTION INTRAVENOUS at 12:17

## 2025-07-07 RX ADMIN — SODIUM CHLORIDE 20 ML/HR: 0.9 INJECTION, SOLUTION INTRAVENOUS at 12:20

## 2025-08-11 ENCOUNTER — TELEPHONE (OUTPATIENT)
Dept: OBGYN CLINIC | Facility: CLINIC | Age: 40
End: 2025-08-11